# Patient Record
Sex: FEMALE | Race: WHITE | Employment: FULL TIME | URBAN - METROPOLITAN AREA
[De-identification: names, ages, dates, MRNs, and addresses within clinical notes are randomized per-mention and may not be internally consistent; named-entity substitution may affect disease eponyms.]

---

## 2017-02-13 RX ORDER — ALBUTEROL SULFATE 90 UG/1
AEROSOL, METERED RESPIRATORY (INHALATION)
Qty: 1 INHALER | Refills: 1 | Status: SHIPPED | OUTPATIENT
Start: 2017-02-13 | End: 2017-02-21 | Stop reason: SDUPTHER

## 2017-02-13 RX ORDER — BUDESONIDE AND FORMOTEROL FUMARATE DIHYDRATE 160; 4.5 UG/1; UG/1
AEROSOL RESPIRATORY (INHALATION)
Qty: 10.2 G | Refills: 0 | Status: SHIPPED | OUTPATIENT
Start: 2017-02-13 | End: 2017-03-17 | Stop reason: SDUPTHER

## 2017-02-21 ENCOUNTER — OFFICE VISIT (OUTPATIENT)
Dept: FAMILY MEDICINE CLINIC | Age: 47
End: 2017-02-21

## 2017-02-21 VITALS
HEART RATE: 76 BPM | RESPIRATION RATE: 12 BRPM | BODY MASS INDEX: 43.54 KG/M2 | WEIGHT: 255 LBS | SYSTOLIC BLOOD PRESSURE: 132 MMHG | DIASTOLIC BLOOD PRESSURE: 92 MMHG | TEMPERATURE: 98.2 F | OXYGEN SATURATION: 97 % | HEIGHT: 64 IN

## 2017-02-21 DIAGNOSIS — R05.9 COUGH: Primary | ICD-10-CM

## 2017-02-21 DIAGNOSIS — H61.21 IMPACTED CERUMEN OF RIGHT EAR: ICD-10-CM

## 2017-02-21 PROCEDURE — 99213 OFFICE O/P EST LOW 20 MIN: CPT | Performed by: NURSE PRACTITIONER

## 2017-02-21 RX ORDER — PREDNISONE 20 MG/1
TABLET ORAL
Qty: 20 TABLET | Refills: 0 | Status: SHIPPED | OUTPATIENT
Start: 2017-02-21 | End: 2017-05-17 | Stop reason: ALTCHOICE

## 2017-02-21 RX ORDER — ALBUTEROL SULFATE 90 UG/1
2 AEROSOL, METERED RESPIRATORY (INHALATION) EVERY 6 HOURS PRN
Qty: 1 INHALER | Refills: 3 | Status: SHIPPED | OUTPATIENT
Start: 2017-02-21 | End: 2018-01-05 | Stop reason: SDUPTHER

## 2017-02-21 ASSESSMENT — ENCOUNTER SYMPTOMS
CHEST TIGHTNESS: 1
WHEEZING: 1
SORE THROAT: 0
SHORTNESS OF BREATH: 1
COUGH: 1
RHINORRHEA: 0
SINUS PRESSURE: 1

## 2017-02-21 ASSESSMENT — PATIENT HEALTH QUESTIONNAIRE - PHQ9
SUM OF ALL RESPONSES TO PHQ9 QUESTIONS 1 & 2: 0
2. FEELING DOWN, DEPRESSED OR HOPELESS: 0
SUM OF ALL RESPONSES TO PHQ QUESTIONS 1-9: 0
1. LITTLE INTEREST OR PLEASURE IN DOING THINGS: 0

## 2017-03-17 RX ORDER — BUDESONIDE AND FORMOTEROL FUMARATE DIHYDRATE 160; 4.5 UG/1; UG/1
AEROSOL RESPIRATORY (INHALATION)
Qty: 10.2 G | Refills: 10 | Status: SHIPPED | OUTPATIENT
Start: 2017-03-17 | End: 2018-01-05

## 2017-03-21 DIAGNOSIS — E03.9 HYPOTHYROIDISM, UNSPECIFIED TYPE: ICD-10-CM

## 2017-03-22 RX ORDER — LEVOTHYROXINE SODIUM 0.05 MG/1
TABLET ORAL
Qty: 30 TABLET | Refills: 0 | Status: SHIPPED | OUTPATIENT
Start: 2017-03-22 | End: 2017-04-22 | Stop reason: SDUPTHER

## 2017-03-27 DIAGNOSIS — E03.9 ACQUIRED HYPOTHYROIDISM: Primary | ICD-10-CM

## 2017-03-30 DIAGNOSIS — E03.9 ACQUIRED HYPOTHYROIDISM: ICD-10-CM

## 2017-03-30 LAB
T4 FREE: 1.4 NG/DL (ref 0.9–1.8)
TSH SERPL DL<=0.05 MIU/L-ACNC: 2.14 UIU/ML (ref 0.27–4.2)

## 2017-04-07 ENCOUNTER — TELEPHONE (OUTPATIENT)
Dept: FAMILY MEDICINE CLINIC | Age: 47
End: 2017-04-07

## 2017-04-20 DIAGNOSIS — E28.2 PCOS (POLYCYSTIC OVARIAN SYNDROME): ICD-10-CM

## 2017-04-22 DIAGNOSIS — E03.9 HYPOTHYROIDISM, UNSPECIFIED TYPE: ICD-10-CM

## 2017-04-24 RX ORDER — LEVOTHYROXINE SODIUM 0.05 MG/1
TABLET ORAL
Qty: 30 TABLET | Refills: 0 | Status: SHIPPED | OUTPATIENT
Start: 2017-04-24 | End: 2017-05-22 | Stop reason: SDUPTHER

## 2017-05-17 ENCOUNTER — OFFICE VISIT (OUTPATIENT)
Dept: FAMILY MEDICINE CLINIC | Age: 47
End: 2017-05-17

## 2017-05-17 VITALS
SYSTOLIC BLOOD PRESSURE: 118 MMHG | DIASTOLIC BLOOD PRESSURE: 82 MMHG | HEIGHT: 64 IN | BODY MASS INDEX: 43.71 KG/M2 | TEMPERATURE: 97.9 F | WEIGHT: 256 LBS | OXYGEN SATURATION: 97 % | HEART RATE: 84 BPM

## 2017-05-17 DIAGNOSIS — J00 NASOPHARYNGITIS: Primary | ICD-10-CM

## 2017-05-17 DIAGNOSIS — H61.21 IMPACTED CERUMEN OF RIGHT EAR: ICD-10-CM

## 2017-05-17 DIAGNOSIS — J02.9 SORE THROAT: ICD-10-CM

## 2017-05-17 LAB — S PYO AG THROAT QL: NORMAL

## 2017-05-17 PROCEDURE — 87880 STREP A ASSAY W/OPTIC: CPT | Performed by: NURSE PRACTITIONER

## 2017-05-17 PROCEDURE — 99213 OFFICE O/P EST LOW 20 MIN: CPT | Performed by: NURSE PRACTITIONER

## 2017-05-17 RX ORDER — AMOXICILLIN 500 MG/1
500 CAPSULE ORAL 2 TIMES DAILY
Qty: 20 CAPSULE | Refills: 0 | Status: SHIPPED | OUTPATIENT
Start: 2017-05-17 | End: 2017-05-27

## 2017-05-17 ASSESSMENT — ENCOUNTER SYMPTOMS
RHINORRHEA: 0
SORE THROAT: 1
SINUS PRESSURE: 1
COUGH: 0

## 2017-05-22 DIAGNOSIS — E03.9 HYPOTHYROIDISM, UNSPECIFIED TYPE: ICD-10-CM

## 2017-05-22 RX ORDER — LEVOTHYROXINE SODIUM 0.05 MG/1
TABLET ORAL
Qty: 90 TABLET | Refills: 3 | Status: SHIPPED | OUTPATIENT
Start: 2017-05-22 | End: 2018-05-01 | Stop reason: SDUPTHER

## 2017-05-23 RX ORDER — BUDESONIDE AND FORMOTEROL FUMARATE DIHYDRATE 160; 4.5 UG/1; UG/1
AEROSOL RESPIRATORY (INHALATION)
Qty: 10.2 G | Refills: 3 | Status: SHIPPED | OUTPATIENT
Start: 2017-05-23 | End: 2018-05-01 | Stop reason: SDUPTHER

## 2017-07-05 ENCOUNTER — OFFICE VISIT (OUTPATIENT)
Dept: FAMILY MEDICINE CLINIC | Age: 47
End: 2017-07-05

## 2017-07-05 VITALS
BODY MASS INDEX: 43.94 KG/M2 | HEART RATE: 73 BPM | OXYGEN SATURATION: 96 % | DIASTOLIC BLOOD PRESSURE: 72 MMHG | WEIGHT: 256 LBS | SYSTOLIC BLOOD PRESSURE: 122 MMHG

## 2017-07-05 DIAGNOSIS — S39.012A LOW BACK STRAIN, INITIAL ENCOUNTER: Primary | ICD-10-CM

## 2017-07-05 DIAGNOSIS — M62.830 LUMBAR PARASPINAL MUSCLE SPASM: ICD-10-CM

## 2017-07-05 PROCEDURE — 99213 OFFICE O/P EST LOW 20 MIN: CPT | Performed by: FAMILY MEDICINE

## 2017-07-05 RX ORDER — CYCLOBENZAPRINE HCL 10 MG
10 TABLET ORAL NIGHTLY
Qty: 10 TABLET | Refills: 0 | Status: SHIPPED | OUTPATIENT
Start: 2017-07-05 | End: 2017-07-15

## 2017-07-05 RX ORDER — PREDNISONE 20 MG/1
TABLET ORAL
Qty: 18 TABLET | Refills: 0 | Status: SHIPPED | OUTPATIENT
Start: 2017-07-05 | End: 2017-07-14

## 2017-08-22 ENCOUNTER — OFFICE VISIT (OUTPATIENT)
Dept: DERMATOLOGY | Age: 47
End: 2017-08-22

## 2017-08-22 DIAGNOSIS — R21 RASH: ICD-10-CM

## 2017-08-22 DIAGNOSIS — L71.9 ROSACEA: ICD-10-CM

## 2017-08-22 DIAGNOSIS — D22.9 MULTIPLE NEVI: ICD-10-CM

## 2017-08-22 DIAGNOSIS — Z87.2 HISTORY OF ACTINIC KERATOSES: ICD-10-CM

## 2017-08-22 PROCEDURE — 99214 OFFICE O/P EST MOD 30 MIN: CPT | Performed by: DERMATOLOGY

## 2017-08-23 DIAGNOSIS — E28.2 PCOS (POLYCYSTIC OVARIAN SYNDROME): ICD-10-CM

## 2017-08-24 ENCOUNTER — TELEPHONE (OUTPATIENT)
Dept: FAMILY MEDICINE CLINIC | Age: 47
End: 2017-08-24

## 2018-01-05 ENCOUNTER — OFFICE VISIT (OUTPATIENT)
Dept: FAMILY MEDICINE CLINIC | Age: 48
End: 2018-01-05

## 2018-01-05 VITALS
HEART RATE: 70 BPM | DIASTOLIC BLOOD PRESSURE: 84 MMHG | SYSTOLIC BLOOD PRESSURE: 118 MMHG | OXYGEN SATURATION: 98 % | WEIGHT: 236 LBS | BODY MASS INDEX: 40.51 KG/M2

## 2018-01-05 DIAGNOSIS — E28.2 PCOS (POLYCYSTIC OVARIAN SYNDROME): ICD-10-CM

## 2018-01-05 DIAGNOSIS — R05.9 COUGH: Primary | ICD-10-CM

## 2018-01-05 DIAGNOSIS — R07.9 RIGHT-SIDED CHEST PAIN: ICD-10-CM

## 2018-01-05 PROCEDURE — 99214 OFFICE O/P EST MOD 30 MIN: CPT | Performed by: FAMILY MEDICINE

## 2018-01-05 RX ORDER — PREDNISONE 20 MG/1
40 TABLET ORAL DAILY
Qty: 10 TABLET | Refills: 0 | Status: SHIPPED | OUTPATIENT
Start: 2018-01-05 | End: 2018-01-10

## 2018-01-05 RX ORDER — ALBUTEROL SULFATE 90 UG/1
2 AEROSOL, METERED RESPIRATORY (INHALATION) EVERY 6 HOURS PRN
Qty: 1 INHALER | Refills: 3 | Status: SHIPPED | OUTPATIENT
Start: 2018-01-05

## 2018-01-05 NOTE — PROGRESS NOTES
[address] Note    Date: 1/5/2018                                               Subjective/Objective:     Chief Complaint   Patient presents with    Chest Congestion     right side pain x 1 week when taking deep breathes    Medication Refill       HPI   R sided rib/chest pain with a deep breath x1 week. No cough. No fever. +mild SOB. No wheeze. Patient Active Problem List    Diagnosis Date Noted    Allergy-induced asthma 02/25/2014    PCOS (polycystic ovarian syndrome) 02/25/2014    Hypothyroidism 10/03/2011    AR (allergic rhinitis) 10/03/2011       Past Medical History:   Diagnosis Date    AR (allergic rhinitis)     Obesity, unspecified     PCO (polycystic ovaries)     Unspecified hypothyroidism        Current Outpatient Prescriptions   Medication Sig Dispense Refill    albuterol sulfate HFA (VENTOLIN HFA) 108 (90 Base) MCG/ACT inhaler Inhale 2 puffs into the lungs every 6 hours as needed for Wheezing 1 Inhaler 3    metFORMIN (GLUCOPHAGE) 500 MG tablet Take 1 tablet by mouth 2 times daily 180 tablet 1    predniSONE (DELTASONE) 20 MG tablet Take 2 tablets by mouth daily for 5 days 10 tablet 0    SYMBICORT 160-4.5 MCG/ACT AERO INHALE 2 PUFFS BY MOUTH TWICE DAILY 10.2 g 3    levothyroxine (SYNTHROID) 50 MCG tablet TAKE 1 TABLET BY MOUTH DAILY 90 tablet 3    Vitamin D (CHOLECALCIFEROL) 1000 UNITS CAPS capsule Take 1,000 Units by mouth daily.  cetirizine (ZYRTEC) 10 MG tablet Take 10 mg by mouth daily.  therapeutic multivitamin-minerals (THERAGRAN-M) tablet Take 1 tablet by mouth daily. No current facility-administered medications for this visit. Allergies   Allergen Reactions    Bactrim     Vicodin [Hydrocodone-Acetaminophen]        Review of Systems   No vomiting, no rash, no bruise    Vitals:  /84 (Site: Left Arm, Position: Sitting, Cuff Size: Large Adult)   Pulse 70   Wt 236 lb (107 kg)   SpO2 98%   Breastfeeding?  No   BMI 40.51

## 2018-01-08 ENCOUNTER — TELEPHONE (OUTPATIENT)
Dept: FAMILY MEDICINE CLINIC | Age: 48
End: 2018-01-08

## 2018-01-08 DIAGNOSIS — E28.2 PCOS (POLYCYSTIC OVARIAN SYNDROME): ICD-10-CM

## 2018-01-08 DIAGNOSIS — Z00.00 ANNUAL PHYSICAL EXAM: ICD-10-CM

## 2018-01-08 DIAGNOSIS — E03.9 ACQUIRED HYPOTHYROIDISM: Primary | ICD-10-CM

## 2018-01-08 NOTE — TELEPHONE ENCOUNTER
Patient scheduled PE with Dr. Jasbir Lewis on 1-29-18 via My Chart  Please place appropriate labs  Patient has been informed to fast.

## 2018-01-12 ENCOUNTER — HOSPITAL ENCOUNTER (OUTPATIENT)
Dept: OTHER | Age: 48
Discharge: HOME | End: 2018-01-12
Attending: FAMILY MEDICINE | Admitting: FAMILY MEDICINE

## 2018-01-12 DIAGNOSIS — E28.2 PCOS (POLYCYSTIC OVARIAN SYNDROME): ICD-10-CM

## 2018-01-12 DIAGNOSIS — E03.9 ACQUIRED HYPOTHYROIDISM: ICD-10-CM

## 2018-01-12 DIAGNOSIS — Z00.00 ANNUAL PHYSICAL EXAM: ICD-10-CM

## 2018-01-12 LAB
A/G RATIO: 1.3 (ref 1.1–2.2)
ALBUMIN SERPL-MCNC: 4 G/DL (ref 3.4–5)
ALP BLD-CCNC: 76 U/L (ref 40–129)
ALT SERPL-CCNC: 18 U/L (ref 10–40)
ANION GAP SERPL CALCULATED.3IONS-SCNC: 15 MMOL/L (ref 3–16)
AST SERPL-CCNC: 19 U/L (ref 15–37)
BASOPHILS ABSOLUTE: 0.1 K/UL (ref 0–0.2)
BASOPHILS RELATIVE PERCENT: 0.6 %
BILIRUB SERPL-MCNC: 0.3 MG/DL (ref 0–1)
BUN BLDV-MCNC: 19 MG/DL (ref 7–20)
CALCIUM SERPL-MCNC: 9.5 MG/DL (ref 8.3–10.6)
CHLORIDE BLD-SCNC: 102 MMOL/L (ref 99–110)
CHOLESTEROL, TOTAL: 169 MG/DL (ref 0–199)
CO2: 27 MMOL/L (ref 21–32)
CREAT SERPL-MCNC: 0.5 MG/DL (ref 0.6–1.1)
EOSINOPHILS ABSOLUTE: 0.3 K/UL (ref 0–0.6)
EOSINOPHILS RELATIVE PERCENT: 3.6 %
ESTIMATED AVERAGE GLUCOSE: 108.3 MG/DL
GFR AFRICAN AMERICAN: >60
GFR NON-AFRICAN AMERICAN: >60
GLOBULIN: 3.1 G/DL
GLUCOSE BLD-MCNC: 89 MG/DL (ref 70–99)
HBA1C MFR BLD: 5.4 %
HCT VFR BLD CALC: 41.6 % (ref 36–48)
HDLC SERPL-MCNC: 51 MG/DL (ref 40–60)
HEMOGLOBIN: 13.4 G/DL (ref 12–16)
LDL CHOLESTEROL CALCULATED: 95 MG/DL
LYMPHOCYTES ABSOLUTE: 4.1 K/UL (ref 1–5.1)
LYMPHOCYTES RELATIVE PERCENT: 44.9 %
MCH RBC QN AUTO: 29.1 PG (ref 26–34)
MCHC RBC AUTO-ENTMCNC: 32.3 G/DL (ref 31–36)
MCV RBC AUTO: 90 FL (ref 80–100)
MONOCYTES ABSOLUTE: 0.6 K/UL (ref 0–1.3)
MONOCYTES RELATIVE PERCENT: 6.8 %
NEUTROPHILS ABSOLUTE: 4.1 K/UL (ref 1.7–7.7)
NEUTROPHILS RELATIVE PERCENT: 44.1 %
PDW BLD-RTO: 13.9 % (ref 12.4–15.4)
PLATELET # BLD: 306 K/UL (ref 135–450)
PMV BLD AUTO: 9.3 FL (ref 5–10.5)
POTASSIUM SERPL-SCNC: 4.6 MMOL/L (ref 3.5–5.1)
RBC # BLD: 4.62 M/UL (ref 4–5.2)
SODIUM BLD-SCNC: 144 MMOL/L (ref 136–145)
TOTAL PROTEIN: 7.1 G/DL (ref 6.4–8.2)
TRIGL SERPL-MCNC: 116 MG/DL (ref 0–150)
TSH REFLEX FT4: 3 UIU/ML (ref 0.27–4.2)
VLDLC SERPL CALC-MCNC: 23 MG/DL
WBC # BLD: 9.2 K/UL (ref 4–11)

## 2018-01-29 ENCOUNTER — OFFICE VISIT (OUTPATIENT)
Dept: FAMILY MEDICINE CLINIC | Age: 48
End: 2018-01-29

## 2018-01-29 VITALS
SYSTOLIC BLOOD PRESSURE: 134 MMHG | HEART RATE: 73 BPM | OXYGEN SATURATION: 99 % | BODY MASS INDEX: 40.29 KG/M2 | HEIGHT: 64 IN | WEIGHT: 236 LBS | DIASTOLIC BLOOD PRESSURE: 78 MMHG

## 2018-01-29 DIAGNOSIS — Z00.00 ROUTINE ADULT HEALTH MAINTENANCE: Primary | ICD-10-CM

## 2018-01-29 DIAGNOSIS — E03.9 ACQUIRED HYPOTHYROIDISM: ICD-10-CM

## 2018-01-29 DIAGNOSIS — L30.9 ECZEMA, UNSPECIFIED TYPE: ICD-10-CM

## 2018-01-29 DIAGNOSIS — Z12.31 VISIT FOR SCREENING MAMMOGRAM: ICD-10-CM

## 2018-01-29 DIAGNOSIS — E28.2 PCOS (POLYCYSTIC OVARIAN SYNDROME): ICD-10-CM

## 2018-01-29 DIAGNOSIS — J45.40 MODERATE PERSISTENT EXTRINSIC ASTHMA WITHOUT COMPLICATION: ICD-10-CM

## 2018-01-29 DIAGNOSIS — E66.01 MORBID OBESITY WITH BMI OF 40.0-44.9, ADULT (HCC): ICD-10-CM

## 2018-01-29 LAB
BACTERIA URINE, POC: NORMAL
BILIRUBIN URINE: 0 MG/DL
BLOOD, URINE: NEGATIVE
CASTS URINE, POC: NORMAL
CLARITY: CLEAR
COLOR: COLORLESS
CRYSTALS URINE, POC: NORMAL
EPI CELLS URINE, POC: NORMAL
GLUCOSE URINE: NORMAL
KETONES, URINE: NEGATIVE
LEUKOCYTE EST, POC: NORMAL
NITRITE, URINE: NEGATIVE
PH UA: 6.5 (ref 4.5–8)
PROTEIN UA: NEGATIVE
RBC URINE, POC: NORMAL
SPECIFIC GRAVITY UA: 1.01 (ref 1–1.03)
UROBILINOGEN, URINE: NORMAL
WBC URINE, POC: NORMAL
YEAST URINE, POC: NORMAL

## 2018-01-29 PROCEDURE — 81000 URINALYSIS NONAUTO W/SCOPE: CPT | Performed by: FAMILY MEDICINE

## 2018-01-29 PROCEDURE — 99396 PREV VISIT EST AGE 40-64: CPT | Performed by: FAMILY MEDICINE

## 2018-01-29 NOTE — PROGRESS NOTES
ASSESSMENT:   Complete physical without pap. 1. Routine adult health maintenance    2. PCOS (polycystic ovarian syndrome)    3. Acquired hypothyroidism    4. Moderate persistent extrinsic asthma without complication    5. Eczema, unspecified type    6. Morbid obesity with BMI of 40.0-44.9, adult ([facility] [address]    7. Visit for screening mammogram          PLAN:   1. All health maintenance issues were updated. 2. Recommend begin progressive daily aerobic exercise program, follow a low fat, low cholesterol diet, attempt to lose weight and continue current medications  3.  Eye exam asap  4.  continue current meds  5. mammogram

## 2018-04-02 DIAGNOSIS — E28.2 PCOS (POLYCYSTIC OVARIAN SYNDROME): ICD-10-CM

## 2018-05-01 DIAGNOSIS — E03.9 HYPOTHYROIDISM, UNSPECIFIED TYPE: ICD-10-CM

## 2018-05-01 RX ORDER — BUDESONIDE AND FORMOTEROL FUMARATE DIHYDRATE 160; 4.5 UG/1; UG/1
AEROSOL RESPIRATORY (INHALATION)
Qty: 10.2 G | Refills: 3 | Status: SHIPPED | OUTPATIENT
Start: 2018-05-01 | End: 2019-01-28 | Stop reason: SDUPTHER

## 2018-05-01 RX ORDER — LEVOTHYROXINE SODIUM 0.05 MG/1
TABLET ORAL
Qty: 90 TABLET | Refills: 3 | Status: SHIPPED | OUTPATIENT
Start: 2018-05-01 | End: 2019-04-20 | Stop reason: SDUPTHER

## 2018-05-07 ENCOUNTER — OFFICE VISIT (OUTPATIENT)
Dept: FAMILY MEDICINE CLINIC | Age: 48
End: 2018-05-07

## 2018-05-07 VITALS
SYSTOLIC BLOOD PRESSURE: 122 MMHG | TEMPERATURE: 98.1 F | DIASTOLIC BLOOD PRESSURE: 82 MMHG | HEART RATE: 79 BPM | WEIGHT: 229.6 LBS | BODY MASS INDEX: 40.03 KG/M2 | OXYGEN SATURATION: 98 % | RESPIRATION RATE: 16 BRPM

## 2018-05-07 DIAGNOSIS — J06.9 VIRAL URI: Primary | ICD-10-CM

## 2018-05-07 PROCEDURE — 99213 OFFICE O/P EST LOW 20 MIN: CPT | Performed by: FAMILY MEDICINE

## 2018-05-07 ASSESSMENT — PATIENT HEALTH QUESTIONNAIRE - PHQ9
SUM OF ALL RESPONSES TO PHQ QUESTIONS 1-9: 0
1. LITTLE INTEREST OR PLEASURE IN DOING THINGS: 0
2. FEELING DOWN, DEPRESSED OR HOPELESS: 0
SUM OF ALL RESPONSES TO PHQ9 QUESTIONS 1 & 2: 0

## 2018-08-23 ENCOUNTER — OFFICE VISIT (OUTPATIENT)
Dept: DERMATOLOGY | Age: 48
End: 2018-08-23

## 2018-08-23 DIAGNOSIS — L71.9 ROSACEA: ICD-10-CM

## 2018-08-23 DIAGNOSIS — R21 RASH: ICD-10-CM

## 2018-08-23 DIAGNOSIS — D22.9 MULTIPLE NEVI: ICD-10-CM

## 2018-08-23 DIAGNOSIS — Z87.2 HISTORY OF ACTINIC KERATOSES: Primary | ICD-10-CM

## 2018-08-23 PROCEDURE — 99213 OFFICE O/P EST LOW 20 MIN: CPT | Performed by: DERMATOLOGY

## 2018-08-23 NOTE — PROGRESS NOTES
[facility]  MD [address]  1970    57 y.o. female     Date of Visit: 2018    Chief Complaint: moles, f/u AK's  Chief Complaint   Patient presents with    Skin Exam     PT comes in today for her yearly full skin exam. No known problems or concerns at this time. Last seen: 1 year ago  *her kids go to [employer]    History of Present Illness:    1. Here for f/u for AK's. Here for full skin check. No new concerns. S/p bx of a persistent papule on the L medial cheek that was read as actinic keratosis and then treated with LN2. Remains clear and no probs with previous sites. She has a hx of sunburns, tanning bed use as a teen but wears sunscreen regularly over the past 10 years. 2. She has scattered asx pigmented lesions on the trunk and extremities, present for many years; no change in size/shape/color of any lesions; no bleeding lesions. 3. She still has waxing/waning intermittent dry patches on the chest and legs. They continue to spontaneously resolve and can be mildly pruritic. They do not bother her much. No trx tried. No family hx of psoriasis but she has 13 yo twin girls with eczema. They started at [facility] 2016.    4. F/u rosacea - has erythema and few papules on the face but no recent trx tried. No personal or family hx of skin cancer. Review of Systems:  Gen: Feels well, good sense of health. Skin: No changing moles or lesions. MuscSkel: No joint pain. Past Medical History, Family History, Surgical History, Medications and Allergies reviewed.     Past Medical History:   Diagnosis Date    Allergy-induced asthma 2014    AR (allergic rhinitis)     Eczema     Morbid obesity with BMI of 40.0-44.9, adult ([address]     PCO (polycystic ovaries)     Unspecified hypothyroidism        Past Surgical History:   Procedure Laterality Date     SECTION      x3    CHOLECYSTECTOMY      SINUS if no improvement - will call if she wants the rx

## 2018-08-30 ENCOUNTER — OFFICE VISIT (OUTPATIENT)
Dept: FAMILY MEDICINE CLINIC | Age: 48
End: 2018-08-30

## 2018-08-30 VITALS
BODY MASS INDEX: 39.96 KG/M2 | SYSTOLIC BLOOD PRESSURE: 128 MMHG | DIASTOLIC BLOOD PRESSURE: 92 MMHG | OXYGEN SATURATION: 98 % | WEIGHT: 229.2 LBS | HEART RATE: 76 BPM

## 2018-08-30 DIAGNOSIS — M54.16 LUMBAR RADICULOPATHY: Primary | ICD-10-CM

## 2018-08-30 PROCEDURE — 99213 OFFICE O/P EST LOW 20 MIN: CPT | Performed by: FAMILY MEDICINE

## 2018-08-30 RX ORDER — METHYLPREDNISOLONE 4 MG/1
TABLET ORAL
Qty: 21 TABLET | Refills: 0 | Status: SHIPPED | OUTPATIENT
Start: 2018-08-30 | End: 2018-09-05

## 2018-09-05 ENCOUNTER — HOSPITAL ENCOUNTER (OUTPATIENT)
Dept: MRI IMAGING | Age: 48
Discharge: HOME | End: 2018-09-05
Attending: FAMILY MEDICINE | Admitting: FAMILY MEDICINE

## 2018-09-05 DIAGNOSIS — M54.16 LUMBAR RADICULOPATHY: ICD-10-CM

## 2018-09-23 DIAGNOSIS — E28.2 PCOS (POLYCYSTIC OVARIAN SYNDROME): ICD-10-CM

## 2019-01-28 RX ORDER — BUDESONIDE AND FORMOTEROL FUMARATE DIHYDRATE 160; 4.5 UG/1; UG/1
AEROSOL RESPIRATORY (INHALATION)
Qty: 10.2 G | Refills: 3 | Status: SHIPPED | OUTPATIENT
Start: 2019-01-28 | End: 2019-02-07 | Stop reason: SDUPTHER

## 2019-02-07 RX ORDER — BUDESONIDE AND FORMOTEROL FUMARATE DIHYDRATE 160; 4.5 UG/1; UG/1
AEROSOL RESPIRATORY (INHALATION)
Qty: 10.2 G | Refills: 3 | Status: SHIPPED | OUTPATIENT
Start: 2019-02-07 | End: 2021-01-20 | Stop reason: SDUPTHER

## 2019-03-08 ENCOUNTER — OFFICE VISIT (OUTPATIENT)
Dept: FAMILY MEDICINE CLINIC | Age: 49
End: 2019-03-08
Payer: COMMERCIAL

## 2019-03-08 VITALS
TEMPERATURE: 98.2 F | OXYGEN SATURATION: 98 % | SYSTOLIC BLOOD PRESSURE: 110 MMHG | HEART RATE: 78 BPM | DIASTOLIC BLOOD PRESSURE: 60 MMHG | WEIGHT: 243.9 LBS | BODY MASS INDEX: 42.53 KG/M2

## 2019-03-08 DIAGNOSIS — B96.89 ACUTE BACTERIAL SINUSITIS: Primary | ICD-10-CM

## 2019-03-08 DIAGNOSIS — J01.90 ACUTE BACTERIAL SINUSITIS: Primary | ICD-10-CM

## 2019-03-08 PROCEDURE — 99213 OFFICE O/P EST LOW 20 MIN: CPT | Performed by: FAMILY MEDICINE

## 2019-03-08 RX ORDER — AMOXICILLIN 500 MG/1
500 CAPSULE ORAL 2 TIMES DAILY
Qty: 20 CAPSULE | Refills: 0 | Status: SHIPPED | OUTPATIENT
Start: 2019-03-08 | End: 2019-03-18

## 2019-03-08 ASSESSMENT — PATIENT HEALTH QUESTIONNAIRE - PHQ9
1. LITTLE INTEREST OR PLEASURE IN DOING THINGS: 0
SUM OF ALL RESPONSES TO PHQ QUESTIONS 1-9: 0
2. FEELING DOWN, DEPRESSED OR HOPELESS: 0
SUM OF ALL RESPONSES TO PHQ9 QUESTIONS 1 & 2: 0
SUM OF ALL RESPONSES TO PHQ QUESTIONS 1-9: 0

## 2019-04-20 DIAGNOSIS — E03.9 HYPOTHYROIDISM, UNSPECIFIED TYPE: ICD-10-CM

## 2019-04-22 RX ORDER — LEVOTHYROXINE SODIUM 0.05 MG/1
TABLET ORAL
Qty: 90 TABLET | Refills: 3 | Status: SHIPPED | OUTPATIENT
Start: 2019-04-22 | End: 2020-04-17 | Stop reason: SDUPTHER

## 2019-05-13 ENCOUNTER — OFFICE VISIT (OUTPATIENT)
Dept: FAMILY MEDICINE CLINIC | Age: 49
End: 2019-05-13
Payer: COMMERCIAL

## 2019-05-13 VITALS
BODY MASS INDEX: 42.03 KG/M2 | TEMPERATURE: 99 F | DIASTOLIC BLOOD PRESSURE: 78 MMHG | SYSTOLIC BLOOD PRESSURE: 118 MMHG | HEIGHT: 64 IN | OXYGEN SATURATION: 96 % | WEIGHT: 246.2 LBS | RESPIRATION RATE: 16 BRPM | HEART RATE: 76 BPM

## 2019-05-13 DIAGNOSIS — R05.9 COUGH: Primary | ICD-10-CM

## 2019-05-13 PROCEDURE — 99213 OFFICE O/P EST LOW 20 MIN: CPT | Performed by: NURSE PRACTITIONER

## 2019-05-13 RX ORDER — AZITHROMYCIN 250 MG/1
TABLET, FILM COATED ORAL
Qty: 1 PACKET | Refills: 0 | Status: SHIPPED | OUTPATIENT
Start: 2019-05-13 | End: 2019-08-22

## 2019-05-13 ASSESSMENT — ENCOUNTER SYMPTOMS
RHINORRHEA: 1
SHORTNESS OF BREATH: 0
SORE THROAT: 0
CHEST TIGHTNESS: 0
WHEEZING: 1
SINUS PRESSURE: 0
COUGH: 1

## 2019-05-13 NOTE — PROGRESS NOTES
SUBJECTIVE:  Pt is a of 54 y.o. female comes in today with   Chief Complaint   Patient presents with    Chest Congestion     Pt states she has chest congestion, cough, ear pain, and head congestion x 3 days          URI    This is a new problem. The current episode started in the past 7 days (5 days ago). The problem has been unchanged. There has been no fever. Associated symptoms include congestion (mild), coughing, headaches, rhinorrhea (milld) and wheezing (mild). Pertinent negatives include no ear pain or sore throat. She has tried nothing for the symptoms. Prior to Visit Medications    Medication Sig Taking? Authorizing Provider   levothyroxine (SYNTHROID) 50 MCG tablet TAKE 1 TABLET BY MOUTH EVERY DAY Yes Natalie Lim MD   budesonide-formoterol (SYMBICORT) 160-4.5 MCG/ACT AERO INHALE 2 PUFFS BY MOUTH TWICE DAILY Yes Tom Tovar MD   metFORMIN (GLUCOPHAGE) 500 MG tablet TAKE 1 TABLET BY MOUTH 2 TIMES DAILY Yes Natalie Lim MD   albuterol sulfate HFA (VENTOLIN HFA) 108 (90 Base) MCG/ACT inhaler Inhale 2 puffs into the lungs every 6 hours as needed for Wheezing Yes Ruddy Damon MD   Vitamin D (CHOLECALCIFEROL) 1000 UNITS CAPS capsule Take 1,000 Units by mouth daily. Yes Historical Provider, MD   cetirizine (ZYRTEC) 10 MG tablet Take 10 mg by mouth daily. Yes Historical Provider, MD   therapeutic multivitamin-minerals (THERAGRAN-M) tablet Take 1 tablet by mouth daily. Yes Historical Provider, MD       Patient's past medical, surgical, social and family histories were reviewed and updated as appropriate. Review of Systems   Constitutional: Positive for fatigue. Negative for chills and fever. HENT: Positive for congestion (mild), postnasal drip (mild) and rhinorrhea (milld). Negative for ear pain, sinus pressure and sore throat. Respiratory: Positive for cough and wheezing (mild). Negative for chest tightness and shortness of breath. Neurological: Positive for headaches. Physical Exam   Constitutional: She is oriented to person, place, and time. She appears well-developed and well-nourished. HENT:   Right Ear: Tympanic membrane normal.   Left Ear: Tympanic membrane normal.   Nose: Nose normal.   Mouth/Throat: Uvula is midline and mucous membranes are normal. Posterior oropharyngeal erythema present. No oropharyngeal exudate. Cardiovascular: Normal rate, regular rhythm and normal heart sounds. Pulmonary/Chest: Effort normal and breath sounds normal.   Lymphadenopathy:     She has no cervical adenopathy. Neurological: She is alert and oriented to person, place, and time. Skin: Skin is warm and dry. Vitals reviewed. /78   Pulse 76   Temp 99 °F (37.2 °C)   Resp 16   Ht 5' 3.5\" (1.613 m)   Wt 246 lb 3.2 oz (111.7 kg)   LMP 05/06/2019 (Exact Date)   SpO2 96%   Breastfeeding? No   BMI 42.93 kg/m²       Assessment/Plan    1. Cough  Continue Symbicort and Ventolin inhalers  - azithromycin (ZITHROMAX) 250 MG tablet; Take 2 tabs (500 mg) on Day 1, and take 1 tab (250 mg) on days 2 through 5. Dispense: 1 packet; Refill: 0  Symtomatic treatment: Tylenol / Advil, rest, increase fluids. May also use:Robitussin DM or Delsym. See pt instructions  F/u5-7 days if no improvement, sooner if symptoms worsen. Discussed use, benefit, and side effects of prescribed medications. All patient questions answered. Pt voiced understanding.

## 2019-05-14 NOTE — TELEPHONE ENCOUNTER
Received by fax from [facility]    Per patient request asking about Statin Therapy for her Diabetes    Scanned into media and sent to Dr. Jacob Pruitt

## 2019-05-15 NOTE — TELEPHONE ENCOUNTER
Printed and filled out. Will fax to pharmacy. Noted on form that pt is not diabetic, that she takes med for PCOS.

## 2019-08-22 ENCOUNTER — OFFICE VISIT (OUTPATIENT)
Dept: DERMATOLOGY | Age: 49
End: 2019-08-22
Payer: COMMERCIAL

## 2019-08-22 DIAGNOSIS — L71.9 ROSACEA: ICD-10-CM

## 2019-08-22 DIAGNOSIS — L30.9 DERMATITIS: ICD-10-CM

## 2019-08-22 DIAGNOSIS — L57.0 AK (ACTINIC KERATOSIS): Primary | ICD-10-CM

## 2019-08-22 DIAGNOSIS — D22.9 MULTIPLE NEVI: ICD-10-CM

## 2019-08-22 PROCEDURE — 99213 OFFICE O/P EST LOW 20 MIN: CPT | Performed by: DERMATOLOGY

## 2019-08-22 PROCEDURE — 17000 DESTRUCT PREMALG LESION: CPT | Performed by: DERMATOLOGY

## 2019-08-22 PROCEDURE — 17003 DESTRUCT PREMALG LES 2-14: CPT | Performed by: DERMATOLOGY

## 2019-08-22 NOTE — PROGRESS NOTES
[facility]  Bam Chopra MD  [address]  1970    54 y.o. female     Date of Visit: 2019    Chief Complaint: moles, f/u AK's  Chief Complaint   Patient presents with    Skin Exam     FSE    - mole check     HX: AK, multi nevi     Last seen: 1 year ago  *her kids go to [facility]    History of Present Illness:    1. Here for f/u for AK's. Here for full skin check. Few new concerns on the face. S/p bx of a persistent papule on the L medial cheek that was read as actinic keratosis and then treated with LN2. No probs with previous sites. She has a hx of sunburns, tanning bed use as a teen but wears sunscreen regularly over the past 10 years. 2. She has scattered asx pigmented lesions on the trunk and extremities, present for many years; no change in size/shape/color of any lesions; no bleeding lesions. 3. She still has waxing/waning intermittent dry patches on the chest and legs. They continue to spontaneously resolve and can be mildly pruritic. They do not bother her much. HC helps. Clear today. No family hx of psoriasis but she has 26 yo twin girls with eczema. They started at [facility] 2016.    4. F/u rosacea - has erythema and few papules on the face but no recent trx tried. Not bothersome. No personal or family hx of skin cancer. Review of Systems:  Gen: Feels well, good sense of health. Skin: No changing moles or lesions. MuscSkel: No joint pain. Past Medical History, Family History, Surgical History, Medications and Allergies reviewed.     Past Medical History:   Diagnosis Date    Allergy-induced asthma 2014    AR (allergic rhinitis)     Eczema     Morbid obesity with BMI of 40.0-44.9, adult ([address]     PCO (polycystic ovaries)     Unspecified hypothyroidism        Past Surgical History:   Procedure Laterality Date     SECTION      x3    CHOLECYSTECTOMY     [facility] ed/reassured  - OK to cont HC 1% cream or call if worsening for prescription trx    4.  Mild rosacea - ET and PP  - discussed treatment - start finacea or soolantra + oral abx if no improvement - will call if she wants the rx

## 2019-09-18 DIAGNOSIS — E28.2 PCOS (POLYCYSTIC OVARIAN SYNDROME): ICD-10-CM

## 2020-03-16 NOTE — TELEPHONE ENCOUNTER
Last office visit 3/8/2019     Last written 9/18/2019    Next office visit scheduled Visit date not found    Requested Prescriptions     Pending Prescriptions Disp Refills    metFORMIN (GLUCOPHAGE) 500 MG tablet 180 tablet 1     Sig: Take 1 tablet by mouth 2 times daily

## 2020-04-17 RX ORDER — LEVOTHYROXINE SODIUM 0.05 MG/1
TABLET ORAL
Qty: 30 TABLET | Refills: 0 | Status: SHIPPED | OUTPATIENT
Start: 2020-04-17 | End: 2020-05-27

## 2020-04-17 NOTE — TELEPHONE ENCOUNTER
Medication:   Requested Prescriptions     Pending Prescriptions Disp Refills    levothyroxine (SYNTHROID) 50 MCG tablet 90 tablet 3     Sig: TAKE 1 TABLET BY MOUTH EVERY DAY       Last Filled:  4/22/2019 #90 w/3 refills     Patient Phone Number: [phone] (home) [phone] (work)    Last appt: 3/8/2019   Next appt: Visit date not found    Last Thyroid:   Lab Results   Component Value Date    TSH 2.14 03/30/2017    T4FREE 1.4 03/30/2017    [id number] 7.5 02/21/2014

## 2020-05-27 RX ORDER — LEVOTHYROXINE SODIUM 0.05 MG/1
TABLET ORAL
Qty: 30 TABLET | Refills: 0 | Status: SHIPPED | OUTPATIENT
Start: 2020-05-27 | End: 2020-06-19

## 2020-06-19 RX ORDER — LEVOTHYROXINE SODIUM 0.05 MG/1
TABLET ORAL
Qty: 30 TABLET | Refills: 0 | Status: SHIPPED | OUTPATIENT
Start: 2020-06-19 | End: 2020-07-22

## 2020-06-19 NOTE — TELEPHONE ENCOUNTER
Medication:   Requested Prescriptions     Pending Prescriptions Disp Refills    levothyroxine (SYNTHROID) 50 MCG tablet [Pharmacy Med Name: LEVOTHYROXINE 50 MCG TABLET] 30 tablet 0     Sig: TAKE 1 TABLET BY MOUTH EVERY DAY       Last Filled:  5/27/2020 30 tabs 0 refills     Patient Phone Number: [phone] (home) [phone] (work)    Last appt: 3/8/2019   Next appt: Visit date not found    Last Thyroid:   Lab Results   Component Value Date    TSH 2.14 03/30/2017    T4FREE 1.4 03/30/2017    [id number] 7.5 02/21/2014         Appointment reminder sent

## 2020-06-22 ENCOUNTER — VIRTUAL VISIT (OUTPATIENT)
Dept: FAMILY MEDICINE CLINIC | Age: 50
End: 2020-06-22
Payer: COMMERCIAL

## 2020-06-22 PROCEDURE — 99214 OFFICE O/P EST MOD 30 MIN: CPT | Performed by: FAMILY MEDICINE

## 2020-06-22 ASSESSMENT — PATIENT HEALTH QUESTIONNAIRE - PHQ9
SUM OF ALL RESPONSES TO PHQ QUESTIONS 1-9: 0
SUM OF ALL RESPONSES TO PHQ9 QUESTIONS 1 & 2: 0
SUM OF ALL RESPONSES TO PHQ QUESTIONS 1-9: 0
2. FEELING DOWN, DEPRESSED OR HOPELESS: 0
1. LITTLE INTEREST OR PLEASURE IN DOING THINGS: 0

## 2020-07-02 ENCOUNTER — HOSPITAL ENCOUNTER (OUTPATIENT)
Dept: MAMMOGRAPHY | Age: 50
Discharge: HOME | End: 2020-07-02
Payer: COMMERCIAL

## 2020-07-02 DIAGNOSIS — E03.9 ACQUIRED HYPOTHYROIDISM: ICD-10-CM

## 2020-07-02 DIAGNOSIS — Z00.00 HEALTHCARE MAINTENANCE: ICD-10-CM

## 2020-07-02 LAB
A/G RATIO: 1.6 (ref 1.1–2.2)
ALBUMIN SERPL-MCNC: 4.4 G/DL (ref 3.4–5)
ALP BLD-CCNC: 76 U/L (ref 40–129)
ALT SERPL-CCNC: 24 U/L (ref 10–40)
ANION GAP SERPL CALCULATED.3IONS-SCNC: 14 MMOL/L (ref 3–16)
AST SERPL-CCNC: 20 U/L (ref 15–37)
BASOPHILS ABSOLUTE: 0.1 K/UL (ref 0–0.2)
BASOPHILS RELATIVE PERCENT: 0.7 %
BILIRUB SERPL-MCNC: <0.2 MG/DL (ref 0–1)
BUN BLDV-MCNC: 14 MG/DL (ref 7–20)
CALCIUM SERPL-MCNC: 9.6 MG/DL (ref 8.3–10.6)
CHLORIDE BLD-SCNC: 101 MMOL/L (ref 99–110)
CHOLESTEROL, TOTAL: 212 MG/DL (ref 0–199)
CO2: 22 MMOL/L (ref 21–32)
CREAT SERPL-MCNC: 0.5 MG/DL (ref 0.6–1.1)
EOSINOPHILS ABSOLUTE: 0.4 K/UL (ref 0–0.6)
EOSINOPHILS RELATIVE PERCENT: 4.4 %
GFR AFRICAN AMERICAN: >60
GFR NON-AFRICAN AMERICAN: >60
GLOBULIN: 2.7 G/DL
GLUCOSE BLD-MCNC: 112 MG/DL (ref 70–99)
HCT VFR BLD CALC: 40.2 % (ref 36–48)
HDLC SERPL-MCNC: 46 MG/DL (ref 40–60)
HEMOGLOBIN: 13.4 G/DL (ref 12–16)
LDL CHOLESTEROL CALCULATED: 129 MG/DL
LYMPHOCYTES ABSOLUTE: 2.6 K/UL (ref 1–5.1)
LYMPHOCYTES RELATIVE PERCENT: 29 %
MCH RBC QN AUTO: 29.6 PG (ref 26–34)
MCHC RBC AUTO-ENTMCNC: 33.4 G/DL (ref 31–36)
MCV RBC AUTO: 88.6 FL (ref 80–100)
MONOCYTES ABSOLUTE: 0.7 K/UL (ref 0–1.3)
MONOCYTES RELATIVE PERCENT: 8.3 %
NEUTROPHILS ABSOLUTE: 5.2 K/UL (ref 1.7–7.7)
NEUTROPHILS RELATIVE PERCENT: 57.6 %
PDW BLD-RTO: 14.4 % (ref 12.4–15.4)
PLATELET # BLD: 256 K/UL (ref 135–450)
PMV BLD AUTO: 9 FL (ref 5–10.5)
POTASSIUM SERPL-SCNC: 4.3 MMOL/L (ref 3.5–5.1)
RBC # BLD: 4.54 M/UL (ref 4–5.2)
SODIUM BLD-SCNC: 137 MMOL/L (ref 136–145)
TOTAL PROTEIN: 7.1 G/DL (ref 6.4–8.2)
TRIGL SERPL-MCNC: 184 MG/DL (ref 0–150)
TSH SERPL DL<=0.05 MIU/L-ACNC: 2.9 UIU/ML (ref 0.27–4.2)
VLDLC SERPL CALC-MCNC: 37 MG/DL
WBC # BLD: 9.1 K/UL (ref 4–11)

## 2020-07-02 PROCEDURE — 77067 SCR MAMMO BI INCL CAD: CPT

## 2020-07-03 DIAGNOSIS — E28.2 PCOS (POLYCYSTIC OVARIAN SYNDROME): ICD-10-CM

## 2020-07-03 LAB
ESTIMATED AVERAGE GLUCOSE: 111.2 MG/DL
HBA1C MFR BLD: 5.5 %

## 2020-07-22 RX ORDER — LEVOTHYROXINE SODIUM 0.05 MG/1
TABLET ORAL
Qty: 90 TABLET | Refills: 3 | Status: SHIPPED | OUTPATIENT
Start: 2020-07-22 | End: 2021-07-19

## 2020-07-22 NOTE — TELEPHONE ENCOUNTER
Medication:   Requested Prescriptions     Pending Prescriptions Disp Refills    levothyroxine (SYNTHROID) 50 MCG tablet [Pharmacy Med Name: LEVOTHYROXINE 50 MCG TABLET] 30 tablet 0     Sig: TAKE 1 TABLET BY MOUTH EVERY DAY        Last Filled:  6/19/2020 30 tabs 0 refills     Patient Phone Number: [phone] (home) [phone] (work)    Last appt: 6/22/2020   Next appt: Visit date not found    Last OARRS: No flowsheet data found.

## 2020-08-24 ENCOUNTER — OFFICE VISIT (OUTPATIENT)
Dept: DERMATOLOGY | Age: 50
End: 2020-08-24
Payer: COMMERCIAL

## 2020-08-24 VITALS — TEMPERATURE: 97.5 F

## 2020-08-24 PROCEDURE — 99214 OFFICE O/P EST MOD 30 MIN: CPT | Performed by: DERMATOLOGY

## 2020-08-24 RX ORDER — FLUOROURACIL 50 MG/G
CREAM TOPICAL
Qty: 40 G | Refills: 0 | Status: SHIPPED | OUTPATIENT
Start: 2020-08-24

## 2020-08-24 NOTE — PROGRESS NOTES
[facility]  Ezequiel Shaw MD  [address]  1970    52 y.o. female     Date of Visit: 2020    Chief Complaint: moles, f/u AK's  Chief Complaint   Patient presents with    Skin Exam     Last seen: 1 year ago  *her kids go to [facility] [employer], just graduated from [employer]    History of Present Illness:    1. Here for f/u for AK's. Here for full skin check. 1 persistent subtle concern on the R cheek  S/p bx of a persistent papule on the L medial cheek that was read as actinic keratosis and then treated with LN2. No probs with previous sites. She has a hx of sunburns, tanning bed use as a teen but wears sunscreen regularly over the past 10 years. 2. She has scattered asx pigmented lesions on the trunk and extremities, present for many years; no change in size/shape/color of any lesions; no bleeding lesions. 3. She still has waxing/waning intermittent dry patches on the chest and legs. They continue to spontaneously resolve and can be mildly pruritic. They do not bother her much. HC helps. Clear today. No family hx of psoriasis but she has 24 yo twin girls with eczema. They graduated [employer] .    4. F/u rosacea - has erythema and few papules on the face but no recent trx tried. Not bothersome. No personal or family hx of skin cancer. Review of Systems:  Gen: Feels well, good sense of health. Skin: No changing moles or lesions. MuscSkel: No joint pain. Past Medical History, Family History, Surgical History, Medications and Allergies reviewed.     Past Medical History:   Diagnosis Date    Allergy-induced asthma 2014    AR (allergic rhinitis)     Eczema     Morbid obesity with BMI of 40.0-44.9, adult ([facility] [address]     PCO (polycystic ovaries)     Unspecified hypothyroidism        Past Surgical History:   Procedure Laterality Date     SECTION      x3    CHOLECYSTECTOMY      SINUS SURGERY         Outpatient Medications ed/reassured  - OK to cont HC 1% cream or call if worsening for prescription trx    4.  Mild rosacea - ET and PP  - discussed treatment - start finacea or soolantra + oral abx if no improvement - will call if she wants the rx

## 2021-01-04 ENCOUNTER — NURSE TRIAGE (OUTPATIENT)
Dept: OTHER | Facility: CLINIC | Age: 51
End: 2021-01-04

## 2021-01-04 ENCOUNTER — TELEMEDICINE (OUTPATIENT)
Dept: FAMILY MEDICINE CLINIC | Age: 51
End: 2021-01-04
Payer: COMMERCIAL

## 2021-01-04 DIAGNOSIS — M54.42 ACUTE LEFT-SIDED LOW BACK PAIN WITH LEFT-SIDED SCIATICA: Primary | ICD-10-CM

## 2021-01-04 PROCEDURE — 99213 OFFICE O/P EST LOW 20 MIN: CPT | Performed by: FAMILY MEDICINE

## 2021-01-04 RX ORDER — METHYLPREDNISOLONE 4 MG/1
TABLET ORAL
Qty: 21 TABLET | Refills: 0 | Status: SHIPPED | OUTPATIENT
Start: 2021-01-04 | End: 2021-01-10

## 2021-01-04 ASSESSMENT — PATIENT HEALTH QUESTIONNAIRE - PHQ9
SUM OF ALL RESPONSES TO PHQ9 QUESTIONS 1 & 2: 0
SUM OF ALL RESPONSES TO PHQ QUESTIONS 1-9: 0

## 2021-01-04 NOTE — PROGRESS NOTES
2021    TELEHEALTH EVALUATION -- Audio/Visual (During HVEJT-52 public health emergency)    HPI:    Pelon Pedroza (:  1970) has requested an audio/video evaluation for the following concern(s):    C/o low back pain after sitting down 5 days ago. Radiates to buttock, mostly on L side. Similar to symptoms she has had in the past. Has done ice, heat, ibuprofen, not helping. Pain is miserable. Can't sleep, can't get comfortable. Changing positions is painful. Review of Systems:  Gen:  Denies fever, chills, headaches. No weight loss  HEENT:  Denies cold symptoms, sore throat. CV:  Denies chest pain or tightness, palpitations. Pulm:  Denies shortness of breath, cough. Abd:  Denies abdominal pain, change in bowel habits. Prior to Visit Medications    Medication Sig Taking? Authorizing Provider   methylPREDNISolone (MEDROL DOSEPACK) 4 MG tablet Take by mouth. Yes Hiren Carrington MD   metFORMIN (GLUCOPHAGE) 500 MG tablet TAKE 1 TABLET BY MOUTH TWICE A DAY Yes Hiren Carrington MD   fluorouracil (EFUDEX) 5 % cream Apply topically 2 times daily x 2-3 weeks. Avoid the eyes. Yes Sarah Dover MD   levothyroxine (SYNTHROID) 50 MCG tablet TAKE 1 TABLET BY MOUTH EVERY DAY Yes Marylee Humphrey, MD   budesonide-formoterol (SYMBICORT) 160-4.5 MCG/ACT AERO INHALE 2 PUFFS BY MOUTH TWICE DAILY Yes Hiren Carrington MD   albuterol sulfate HFA (VENTOLIN HFA) 108 (90 Base) MCG/ACT inhaler Inhale 2 puffs into the lungs every 6 hours as needed for Wheezing Yes Sathihs Xie MD   Vitamin D (CHOLECALCIFEROL) 1000 UNITS CAPS capsule Take 1,000 Units by mouth daily. Yes Historical Provider, MD   cetirizine (ZYRTEC) 10 MG tablet Take 10 mg by mouth daily. Yes Historical Provider, MD   therapeutic multivitamin-minerals (THERAGRAN-M) tablet Take 1 tablet by mouth daily.  Yes Historical Provider, MD       Past Medical History:   Diagnosis Date    Allergy-induced asthma 2014    AR (allergic rhinitis)  Eczema     Morbid obesity with BMI of 40.0-44.9, adult ([address]     PCO (polycystic ovaries)     Unspecified hypothyroidism        Past Surgical History:   Procedure Laterality Date     SECTION      x3    CHOLECYSTECTOMY      SINUS SURGERY         No family history on file. Allergies   Allergen Reactions    Bactrim     Vicodin [Hydrocodone-Acetaminophen]        Social History     Tobacco Use    Smoking status: Never Smoker    Smokeless tobacco: Never Used   Substance Use Topics    Alcohol use: Yes     Comment: occ    Drug use: No          PHYSICAL EXAMINATION:  Vital Signs: (As obtained by patient/caregiver or practitioner observation)  There were no vitals taken for this visit. Patient-Reported Vitals 2021   Patient-Reported Weight 250   Patient-Reported Height 5'4        Respiratory rate appears normal      Constitutional: Appears well-developed and well-nourished. No apparent distress    Mental status: Alert and awake. Oriented to person/place/time. Able to follow commands    Eyes: EOM normal. Sclera normal. No discharge visible  HENT: Normocephalic, atraumatic. Mouth/Throat: Mucous membranes are moist. External Ears Normal    Neck: No visualized mass   Pulmonary/Chest: Respiratory effort normal.  No visualized signs of difficulty breathing or respiratory distress        Musculoskeletal:  Normal range of motion of neck  Neurological:       No Facial Asymmetry (Cranial nerve 7 motor function) (limited exam to video visit). No gaze palsy       Skin:  No significant exanthematous lesions or discoloration noted on facial skin       Psychiatric: Normal Affect. No Hallucinations            ASSESSMENT/PLAN:  1. Acute left-sided low back pain with left-sided sciatica  - methylPREDNISolone (MEDROL DOSEPACK) 4 MG tablet; Take by mouth. Dispense: 21 tablet; Refill: 0  MRI L spine 2018 reviewed w patient- hx of L4-5 facet arthropathy.  Will refer to PT/spine if pain persists      No follow-ups on file. Ellsworth Severin is a 52 y.o. female being evaluated by a Virtual Visit (video visit) encounter to address concerns as mentioned above. A caregiver was present when appropriate. Due to this being a TeleHealth encounter (During XWEGA-68 public health emergency), evaluation of the following organ systems was limited: Vitals/Constitutional/EENT/Resp/CV/GI//MS/Neuro/Skin/Heme-Lymph-Imm. Pursuant to the emergency declaration under the [address] [place] [address], [address] [place] [address] authority and the [employer] Act, this Virtual Visit was conducted with patient's (and/or legal guardian's) consent, to reduce the patient's risk of exposure to COVID-19 and provide necessary medical care. The patient (and/or legal guardian) has also been advised to contact this office for worsening conditions or problems, and seek emergency medical treatment and/or call 911 if deemed necessary. Patient identification was verified at the start of the visit: Yes    Total time spent on this encounter: 10 minutes. Services were provided through a video synchronous discussion virtually to substitute for in-person clinic visit. Patient was located in their home. Provider was located in the office. --Adam Jaeger MD on 1/4/2021 at 1:10 PM    An electronic signature was used to authenticate this note. Gianfranco Oviedo

## 2021-01-04 NOTE — TELEPHONE ENCOUNTER
Cuidado del recién nacido  ([facility])  ¿QUÉ YVON SABER SOBRE EL BAÑO DE MI BEBÉ?   · Si limpia los derrames y la regurgitación, y mantiene la dolores del pañal limpia, el bebé solo necesita un baño de dos a vicente veces por semana.  · No le dé al bebé un baño de inmersión hasta que:  ¨ El cordón umbilical se haya caído y la piel del ombligo tenga un aspecto normal.  ¨ El lugar de la circuncisión se haya curado, en el dulce de los varones circuncidados. Hasta que esto ocurra, solo davis al bebé un baño con esponja.  · Escoja un momento del día en el que pueda relajarse y disfrutar de kahlil momento con diamond bebé. Evite el baño poco antes o después de alimentarlo.  · Nunca deje al bebé solo en felicita superficie elevada desde donde pueda caerse.  · Siempre sostenga al bebé con felicita mano mientras lo baña. Nunca deje al bebé solo en el agua.  · Para que el bebé no sienta frío, cúbralo con felicita toalla o un paño, excepto en las partes del cuerpo que esté limpiando con la esponja. Tenga felicita toalla preparada cerca para envolver al bebé inmediatamente después de bañarlo.  Pasos para bañar al bebé  · Lávese las armando con jabón y agua tibia.  · Prepare todos los elementos necesarios para el bebé. [place] incluye lo siguiente:  ¨ Felicita palangana con dos o vicente pulgadas (5,1 a 7,6 cm) de agua tibia. Siempre controle la temperatura del agua con el codo o la presley antes de bañar al bebé para asegurarse que no esté demasiado caliente.  ¨ Jabón y champú suaves para bebé.  ¨ Felicita taza para enjuagar.  ¨ Felicita toalla y toallita de mano suaves.  ¨ Torundas.  ¨ Ropa y mantas limpias.  ¨ Pañales.  · Comience el baño limpiando alrededor de cada nhan con felicita esquina distinta de la toallita o con torundas diferentes. Limpie suavemente desde el ángulo interno hasta el ángulo externo del nhan solo con agua limpia. No use jabón en la jez del bebé. A continuación, lave el zoltan de la jez del bebé con un paño limpio o felicita parte diferente de la toallita de  Reason for Disposition   Age > 48 and no history of prior similar back pain    Answer Assessment - Initial Assessment Questions  1. ONSET: \"When did the pain begin? \"       12/31/20    2. LOCATION: \"Where does it hurt? \" (upper, mid or lower back)    Lower    3. SEVERITY: \"How bad is the pain? \"  (e.g., Scale 1-10; mild, moderate, or severe)    - MILD (1-3): doesn't interfere with normal activities     - MODERATE (4-7): interferes with normal activities or awakens from sleep     - SEVERE (8-10): excruciating pain, unable to do any normal activities   Moderate now, but can be severe at times    4. PATTERN: \"Is the pain constant? \" (e.g., yes, no; constant, intermittent)   Constant    5. RADIATION: \"Does the pain shoot into your legs or elsewhere? \"    Back of legs and feet    6. CAUSE:  \"What do you think is causing the back pain? \"   unsure    7. BACK OVERUSE:  [place] recent lifting of heavy objects, strenuous work or exercise? \"  No    8. MEDICATIONS: \"What have you taken so far for the pain? \" (e.g., nothing, acetaminophen, NSAIDS)  advil    9. NEUROLOGIC SYMPTOMS: \"Do you have any weakness, numbness, or problems with bowel/bladder control? \"  Weakness and numbness in legs at times    10. OTHER SYMPTOMS: \"Do you have any other symptoms? \" (e.g., fever, abdominal pain, burning with urination, blood in urine)  No      11. PREGNANCY: \"Is there any chance you are pregnant? \" (e.g., yes, no; LMP)   no, yesterday    Protocols used: BACK PAIN-ADULT-[place]    Patient called pre-service center [facility] with red flag complaint. Brief description of triage: back pain    Triage indicates for patient to be seen today or tomorrow. Care advice provided, patient verbalizes understanding; denies any other questions or concerns; instructed to call back for any new or worsening symptoms. Writer provided warm transfer to [place] [facility] for appointment scheduling. Attention Provider:  Thank you for allowing me to mano.  · No use hisopos con punta de algodón para limpiar las orejas o la nariz. Solo lave los pliegues externos de las orejas y la nariz. Si se acumula moco en la nariz que usted puede aury, puede retorcer leland torunda húmeda y quitar el moco o utilizar leland autumn de goma con suavidad. Los hisopos con punta de algodón pueden lastimar la parte sensible en el interior de la nariz o las orejas.  · Para ale la farhana del bebé, sostenga la farhana y el marisol con leland mano. Humedezca el dhaval y luego aplique leland pequeña cantidad de champú para bebé. Enjuague dell el dhaval con leland toallita con agua tibia mientras se asegura de que el agua jabonosa no entre en los ojos del bebé. Si el bebé tiene manchas de piel escamosa en la farhana (costra láctea), afloje las escamas delicadamente con un cepillo suave o leland toallita de mano antes del enjuague.  · Siga lavando el zoltan del cuerpo y, por último, limpie la dolores del pañal. Limpie dentro y alrededor de arrugas y pliegues con delicadeza. Enjuague dell el jabón con agua para evitar la sequedad en la piel.  · Fili el baño, derrame agua tibia suavemente sobre el cuerpo del bebé para que no tome frío.  · Si es leland pam, limpie entre los pliegues de los labios vaginales con leland torunda empapada en agua. Asegúrese de limpiar de adelante hacia atrás leland wily vez con leland torunda.  ¨ Algunas bebas tienen leland secreción sanguinolenta que sale de la vagina. [place] se debe a un cambio hormonal repentino después del nacimiento. También puede presentarse leland secreción palomo. Ambas son normales y deberían desaparecer solas.  · Si es un varón, lave el pene delicadamente con agua tibia y leland torunda o leland toalla suave. Si el bebé no fue circuncidado, no tire el prepucio hacia atrás para limpiarlo. [place] ocasiona dolor. Solo limpie la piel externa. Si el bebé fue circuncidado, siga las instrucciones del pediatra sobre cómo limpiar el lugar de la circuncisión.  · Inmediatamente después del baño,  participate in the care of your patient. The patient was connected to triage in response to information provided to the E[facility]. Please do not respond through this encounter as the response is not directed to a shared pool. envuelva al bebé en leland toalla tibia.  ¿QUÉ YVON SABER SOBRE EL CUIDADO DEL CORDÓN UMBILICAL?   · El cordón umbilical debe caerse y sanar por sí solo a las dos o vicente semanas de long del bebé. No desprenda el muñón del cordón umbilical.  · Mantenga la dolores que rodea el cordón y el muñón limpia y seca.  ¨ Si el muñón umbilical se ensucia, se puede limpiar con agua. Séquelo dando golpecitos suaves con leland toalla limpia todo alrededor.  · Doble la parte delantera del pañal para que pueda secarse la base del cordón. De kahlil modo, se caerá más rápidamente.  · Es posible que observe un pequeña cantidad de secreción pegajosa o de venita antes de que caiga el muñón umbilical. [place] es normal.  ¿QUÉ YVON SABER SOBRE EL CUIDADO DE LA CIRCUNCISIÓN?   · Si diamond bebé fue circuncidado:  ¨ El pene puede estar envuelto en gasa con leland capa de vaselina. Si es así, retírela según las indicaciones del pediatra.  ¨ Lave suavemente el pene jorge se lo haya indicado el pediatra. Aplique vaselina en la punta del pene del bebé cada vez que le cambia el pañal, únicamente jorge se lo haya indicado el pediatra y hasta que la dolores haya sanado dell. Generalmente, la cicatrización tarda unos días.  · Si se realizó leland circuncisión con un anillo de plástico, lave y seque suavemente el pene jorge se lo haya indicado el pediatra. Aplique vaselina en el lugar de la circuncisión si se lo indicó el pediatra. El anillo de plástico en el extremo del pene se aflojará en los bordes y se caerá en leland o dos semanas después de la circuncisión. No desprenda el anillo.  ¨ Si el anillo de plástico no se cayó después de 14 días o si el pene se hincha o se observa leland secreción o sangrado grigsby brillante, llame al pediatra.  ¿QUÉ YVON SABER SOBRE LA PIEL DE MI BEBÉ?   · Es normal que las armando y los pies del bebé tengan un aspecto ligeramente azulado o grisáceo jazlyn las primeras semanas de long. No es normal que toda la jez o el cuerpo del bebé tengan un color  azulado o grisáceo.  · Los recién nacidos pueden tener manchas de nacimiento en el cuerpo. Consulte al pediatra sobre cualquier luis que encuentre.  · La piel del bebé a menudo se enrojece cuando llora.  · Es frecuente que la piel del bebé se descame jazlyn los primeros días de long. [place] se debe a un proceso de adaptación al aire seco fuera del útero.  · El acné del bebé es común en los primeros meses de long y, por lo general, no es necesario tratarlo.  · Algunas erupciones son comunes en los bebés recién nacidos. Consulte al pediatra sobre cualquier erupción que observe.  · La costra láctea es muy frecuente y no suele necesitar tratamiento.  · Puede aplicarle al bebé leland crema humectante para bebé en la piel después de bañarlo a fin de prevenir la piel seca y las erupciones cutáneas, jorge el eccema.  ¿QUÉ YVON SABER SOBRE LAS DEPOSICIONES DE MI BEBÉ?  · Las primeras deposiciones del bebé, también llamadas heces, son pegajosas y de color sukhwinder verdoso, y se las llama meconio.  · Las primeras heces del bebé normalmente ocurren dentro de las primeras 36 horas de long.  · Unos días después del nacimiento, cambian y pasan a ser heces blandas, de un color amarillo mostaza si lo amamanta, o a heces más espesas y de color amarillo amarronado si lo alimenta con leche maternizada. No obstante, las heces pueden ser feliciano, verdes o marrones.  · El bebé puede defecar cada vez que lo alimenta o de cuatro a orestes veces por día en las primeras semanas de long. Cada bebé es diferente.  · Después del primer mes, las heces de los bebés que se alimentan con leche materna suelen ser menos frecuentes y pueden ocurrir hasta menos de leland vez por día. Los bebés alimentados con leche maternizada tienden a defecar leland vez por día, jorge mínimo.  · Un bebé tiene diarrea si presenta gran cantidad de heces acuosas en un mismo día. Si el bebé tiene diarrea, es posible observar un anillo de agua que rodea las heces en el pañal. Consulte  al pediatra si diamond bebé tiene diarrea.  · El estreñimiento se caracteriza por heces duras que pueden ser difíciles de evacuar o parecen causar dolor. Sin embargo, la mayoría de los recién nacidos emiten gemidos y hacen esfuerzo al defecar. [place] es normal si las heces son blandas.  ¿QUÉ CONSEJOS YVON TENER EN CUENTA PARA EL CUIDADO DE MI BEBÉ EN GENERAL?   · Para dormir, coloque al bebé boca arriba. [place] es lo más importante que puede hacer para reducir el riesgo del síndrome de muerte súbita del lactante (SMSL).  ¨ No use ninguna almohada, ropa de cama suelta ni animales de dionne cuando acuesta al bebé.  · Si es posible, córtele las uñas de las armando y de los pies mientras duerme.  ¨ Comience a cortarle las uñas de las armando y de los pies solo después de observar leland separación dell definida entre la uña y la piel debajo de la uña.  · No es necesario vika la temperatura del bebé todos los esme. Hágalo solo cuando considere que la piel del bebé parece más caliente de lo habitual o si parece estar enfermo.  ¨ Utilice solo termómetros digitales. No use termómetros con carol.  ¨ Lubrique el termómetro con vaselina e introduzca el extremo aproximadamente media pulgada (1,27 cm) en el recto.  ¨ Sostenga el termómetro en el lugar jazlyn dos o vicente minutos o hasta que emita un pitido, mientras aprieta las nalgas del bebé.  · Lo enviarán a diamond casa con la autumn de goma desechable que usaron con diamond bebé. Úsela para extraer moco de la nariz si el bebé está congestionado.  ¨ Apriete la autumn, introduzca la punta suavemente en rach de los orificios nasales y permita que la autumn se expanda. Succionará el moco del orificio nasal.  ¨ Apriete la autumn de goma para eliminar el moco por el fregadero.  ¨ Repita el procedimiento en el otro orificio nasal.  ¨ Lave dell la autumn de goma con agua y jabón, y enjuáguela con abundante agua después de cada uso.  · Los bebés no regulan dell la temperatura corporal jazlyn los primeros meses de  long.No lo abrigue demasiado. Vístalo según el clima. [place] buena guía es vestirlo con leland capa más de ropa de la que a usted le resulta cómodo usar.  ¨ Si la piel del bebé se siente caliente y húmeda debido al sudor, está demasiado abrigado y puede estar incómodo. Quítele leland capa de ropa para que se refresque.  ¨ Si lo sigue sintiendo caliente, controle la temperatura. Comuníquese con el pediatra si el bebé tiene fiebre.  · Es romo que el bebé reciba aire fresco delfin evite llevarlo a áreas públicas donde haya mucha gente, por ejemplo, centros comerciales, hasta que tenga varias semanas de long. En las multitudes, el bebé puede estar expuesto a resfríos, virus y otras infecciones. Evite el contacto con personas que estén enfermas.  · Evite llevar al bebé a viajes de larga distancia, según se lo haya indicado el pediatra.  · No use un horno de microondas para calentar leche maternizada. El biberón permanece frío delfin la leche maternizada puede estar muy caliente. Recalentar la leche materna en un horno de microondas también reduce o elimina las propiedades inmunitarias naturales de la leche. Si es necesario, es mejor calentar la leche descongelada en un biberón dentro de leland cacerola con agua tibia. Siempre controle la temperatura de la leche en la parte interna de la presley antes de dársela al bebé.  · Lávese las armando con [place] y jabón después de cambiarle los pañales y después de ir al baño.  · Concurra a todas las visitas de control del bebé jorge se lo haya indicado el pediatra. [place] es importante.  ¿CUÁNDO YVON LLAMAR O CONCURRIR AL PEDIATRA?   · El muñón del cordón umbilical no se  y el bebé ya tiene vicente semanas de long.  · El bebé presenta enrojecimiento, hinchazón o leland secreción con olor fétido alrededor de la dolores umbilical.  · El bebé parece sentir dolor cuando le toca el abdomen.  · El bebé llora más de lo habitual o el llanto tiene un mariana o un maria g diferente.  · El bebé no se  alimenta.  · El bebé vomitó más de leland vez.  · El bebé tiene leland dermatitis del pañal que:  ¨ No desaparece después de vicente días de tratamiento.  ¨ Tiene llagas, pus o sangrado.  · El bebé no defecó en cuatro días o las heces son duras.  · Observa un color amarillo en la piel o la dolores palomo del nhan del bebé (ictericia).  · El bebé tiene leland erupción cutánea.  ¿CUÁNDO YVON LLAMAR  O CONCURRIR A LA CRISTELA DE EMERGENCIA?   · El bebé es greg de 3 meses y tiene fiebre de 100 °F (38 °C) o más.  · El bebé parece tener poca energía o estar menos activo o alerta de lo habitual cuando está despierto (letárgico).  · El bebé vomita de manera frecuente o compulsiva, o el vómito es martha y tiene venita.  · El bebé está sangrando activamente en el cordón umbilical o en el lugar de la circuncisión.  · El bebé tiene diarrea bryan o hay venita en las heces.  · El bebé tiene dificultad para respirar o parece dejar de respirar.  · El bebé presenta un color azulado o grisáceo en la piel, en otras partes del cuerpo además de las armando o los pies.     Esta información no tiene jorge fin reemplazar el consejo del médico. Asegúrese de hacerle al médico cualquier pregunta que tenga.     Document Released: 12/18/2006 Document Revised: 01/08/2016  Elsevier Interactive Patient Education ©2016 [employer]vier Inc.

## 2021-01-20 RX ORDER — BUDESONIDE AND FORMOTEROL FUMARATE DIHYDRATE 160; 4.5 UG/1; UG/1
AEROSOL RESPIRATORY (INHALATION)
Qty: 10.2 G | Refills: 3 | Status: SHIPPED | OUTPATIENT
Start: 2021-01-20 | End: 2021-08-02 | Stop reason: SDUPTHER

## 2021-01-20 NOTE — TELEPHONE ENCOUNTER
Medication:   Requested Prescriptions     Pending Prescriptions Disp Refills    budesonide-formoterol (SYMBICORT) 160-4.5 MCG/ACT AERO 10.2 g 3     Sig: INHALE 2 PUFFS BY MOUTH TWICE DAILY        Last Filled:  2/7/2019 10.2 g 3 refills    Patient Phone Number: [phone] (home) [phone] (work)    Last appt: 1/4/2021   Next appt: Visit date not found    Last OARRS: No flowsheet data found.

## 2021-01-20 NOTE — TELEPHONE ENCOUNTER
Medication and Quantity requested: budesonide-formoterol (SYMBICORT) 160-4.5 MCG/ACT AERO     Quantity 10.2 GM     Last Visit  1/4/21    Pharmacy and phone number updated in [email or web address]:  Yes [facility]

## 2021-06-09 DIAGNOSIS — E28.2 PCOS (POLYCYSTIC OVARIAN SYNDROME): ICD-10-CM

## 2021-06-18 DIAGNOSIS — E28.2 PCOS (POLYCYSTIC OVARIAN SYNDROME): ICD-10-CM

## 2021-06-18 NOTE — TELEPHONE ENCOUNTER
Medication:   Requested Prescriptions     Pending Prescriptions Disp Refills    metFORMIN (GLUCOPHAGE) 500 MG tablet 180 tablet 0        Last Filled:  6/9/2021 180 tabs 0 refils     Patient Phone Number: [phone] (home) [phone] (work)    Last appt: 1/4/2021   Next appt: Visit date not found    Last OARRS: No flowsheet data found.

## 2021-06-24 ENCOUNTER — TELEPHONE (OUTPATIENT)
Dept: DERMATOLOGY | Age: 51
End: 2021-06-24

## 2021-07-19 DIAGNOSIS — E03.9 HYPOTHYROIDISM, UNSPECIFIED TYPE: ICD-10-CM

## 2021-07-19 RX ORDER — LEVOTHYROXINE SODIUM 0.05 MG/1
TABLET ORAL
Qty: 90 TABLET | Refills: 0 | Status: SHIPPED | OUTPATIENT
Start: 2021-07-19 | End: 2021-10-18

## 2021-07-19 NOTE — TELEPHONE ENCOUNTER
Medication:   Requested Prescriptions     Pending Prescriptions Disp Refills    levothyroxine (SYNTHROID) 50 MCG tablet [Pharmacy Med Name: LEVOTHYROXINE 50 MCG TABLET] 90 tablet 3     Sig: TAKE 1 TABLET BY MOUTH EVERY DAY        Last Filled:  7/22/2020 #90 R3    Patient Phone Number: [phone] (home) [phone] (work)    Last appt: 1/4/2021   Next appt: Visit date not found    Last OARRS: No flowsheet data found.

## 2021-08-02 RX ORDER — BUDESONIDE AND FORMOTEROL FUMARATE DIHYDRATE 160; 4.5 UG/1; UG/1
AEROSOL RESPIRATORY (INHALATION)
Qty: 10.2 G | Refills: 3 | Status: SHIPPED | OUTPATIENT
Start: 2021-08-02 | End: 2021-11-27 | Stop reason: SDUPTHER

## 2021-08-02 NOTE — TELEPHONE ENCOUNTER
Medication:   Requested Prescriptions     Pending Prescriptions Disp Refills    budesonide-formoterol (SYMBICORT) 160-4.5 MCG/ACT AERO 10.2 g 3     Sig: INHALE 2 PUFFS BY MOUTH TWICE DAILY        Last Filled:  1/20/21 10.2g3rf    Patient Phone Number: [phone] (home) [phone] (work)    Last appt: 1/4/2021   Next appt: 8/9/2021    Last OARRS: No flowsheet data found.

## 2021-08-09 ENCOUNTER — OFFICE VISIT (OUTPATIENT)
Dept: FAMILY MEDICINE CLINIC | Age: 51
End: 2021-08-09
Payer: COMMERCIAL

## 2021-08-09 ENCOUNTER — HOSPITAL ENCOUNTER (OUTPATIENT)
Dept: MAMMOGRAPHY | Age: 51
Discharge: HOME | End: 2021-08-09
Payer: COMMERCIAL

## 2021-08-09 VITALS
HEART RATE: 78 BPM | HEIGHT: 64 IN | OXYGEN SATURATION: 98 % | DIASTOLIC BLOOD PRESSURE: 84 MMHG | WEIGHT: 256 LBS | SYSTOLIC BLOOD PRESSURE: 132 MMHG | BODY MASS INDEX: 43.71 KG/M2

## 2021-08-09 VITALS — BODY MASS INDEX: 42.68 KG/M2 | HEIGHT: 64 IN | WEIGHT: 250 LBS

## 2021-08-09 DIAGNOSIS — E28.2 PCOS (POLYCYSTIC OVARIAN SYNDROME): ICD-10-CM

## 2021-08-09 DIAGNOSIS — Z12.31 ENCOUNTER FOR SCREENING MAMMOGRAM FOR BREAST CANCER: ICD-10-CM

## 2021-08-09 DIAGNOSIS — Z12.11 COLON CANCER SCREENING: ICD-10-CM

## 2021-08-09 DIAGNOSIS — J45.40 MODERATE PERSISTENT EXTRINSIC ASTHMA WITHOUT COMPLICATION: ICD-10-CM

## 2021-08-09 DIAGNOSIS — E78.5 HYPERLIPIDEMIA, UNSPECIFIED HYPERLIPIDEMIA TYPE: ICD-10-CM

## 2021-08-09 DIAGNOSIS — E03.9 ACQUIRED HYPOTHYROIDISM: ICD-10-CM

## 2021-08-09 DIAGNOSIS — Z00.00 ANNUAL PHYSICAL EXAM: Primary | ICD-10-CM

## 2021-08-09 PROCEDURE — 99396 PREV VISIT EST AGE 40-64: CPT | Performed by: FAMILY MEDICINE

## 2021-08-09 PROCEDURE — 77063 BREAST TOMOSYNTHESIS BI: CPT

## 2021-08-09 SDOH — ECONOMIC STABILITY: FOOD INSECURITY: WITHIN THE PAST 12 MONTHS, YOU WORRIED THAT YOUR FOOD WOULD RUN OUT BEFORE YOU GOT MONEY TO BUY MORE.: NEVER TRUE

## 2021-08-09 SDOH — ECONOMIC STABILITY: FOOD INSECURITY: WITHIN THE PAST 12 MONTHS, THE FOOD YOU BOUGHT JUST DIDN'T LAST AND YOU DIDN'T HAVE MONEY TO GET MORE.: NEVER TRUE

## 2021-08-09 ASSESSMENT — SOCIAL DETERMINANTS OF HEALTH (SDOH): HOW HARD IS IT FOR YOU TO PAY FOR THE VERY BASICS LIKE FOOD, HOUSING, MEDICAL CARE, AND HEATING?: NOT HARD AT ALL

## 2021-08-09 NOTE — PROGRESS NOTES
Activity    Alcohol use: Yes     Comment: occ    Drug use: No    Sexual activity: Yes     Partners: Male     Comment:    Other Topics Concern    Not on file   Social History Narrative    Not on file     Social Determinants of Health     Financial Resource Strain: Low Risk     Difficulty of Paying Living Expenses: Not hard at all   Food Insecurity: No Food Insecurity    Worried About Running Out of Food in the Last Year: Never true    [address] in the Last Year: Never true   Transportation Needs:     Lack of Transportation (Medical):      Lack of Transportation (Non-Medical):    Physical Activity:     Days of Exercise per Week:     Minutes of Exercise per Session:    Stress:     Feeling of Stress :    Social Connections:     Frequency of Communication with Friends and Family:     Frequency of Social Gatherings with Friends and Family:     Attends Religion Services:     Active Member of Clubs or Organizations:     Attends Club or Organization Meetings:     Marital Status:    Intimate Partner Violence:     Fear of Current or Ex-Partner:     Emotionally Abused:     Physically Abused:     Sexually Abused:        Family History   Problem Relation Age of Onset    Breast Cancer Maternal Grandmother          Health Maintenance   Topic Date Due    Hepatitis C screen  Never done    Colon cancer screen colonoscopy  Never done    Cervical cancer screen  01/20/2018    TSH testing  07/02/2021    Pneumococcal 0-64 years Vaccine (1 of 2 - PPSV23) 06/14/2030 (Originally 4/14/1976)    Flu vaccine (1) 09/01/2021    Diabetes screen  07/02/2023    DTaP/Tdap/Td vaccine (2 - Td or Tdap) 02/25/2024    Lipid screen  07/02/2025    COVID-19 Vaccine  Completed    Hepatitis A vaccine  Aged Out    Hepatitis B vaccine  Aged Out    Hib vaccine  Aged Out    Meningococcal (ACWY) vaccine  Aged Out    HIV screen  Discontinued         Review Of Systems:  Skin: no changing moles, abnormal pigmentation, rash, scaling, itching, masses, hair or nail changes  Eyes: no blurring, diplopia, or eye pain  Ears/Nose/Throat: no hearing loss, tinnitus, vertigo, nosebleed, nasal congestion, rhinorrhea, sore throat  Respiratory: no cough, pleuritic chest pain, dyspnea, or wheezing  Cardiovascular: no angina, RUDOLPH, orthopnea, PND, palpitations, or claudication  Gastrointestinal: no nausea, vomiting, heartburn, diarrhea, constipation, bloating, or abdominal pain  Genitourinary: no urinary urgency, frequency, dysuria, nocturia, hesitancy, or incontinence  Musculoskeletal: no arthritis, arthralgia, myalgia, weakness, or morning stiffness  Neurologic: no paralysis, paresis, paresthesia, seizures, tremors, or headaches  Hematologic/Lymphatic/Immunologic: no anemia, abnormal bleeding/bruising, fever, chills, night sweats, swollen glands, or unexplained weight loss  Endocrine: no heat or cold intolerance and no polyphagia, polydipsia, or polyuria    PHYSICAL EXAMINATION:  /84 (Site: Right Upper Arm, Position: Sitting, Cuff Size: Medium Adult)   Pulse 78   Ht 5' 4\" (1.626 m)   Wt 256 lb (116.1 kg)   LMP 07/25/2021 (Exact Date)   SpO2 98%   BMI 43.94 kg/m²   General appearance: healthy, alert, no distress  Skin: Skin color, texture, turgor normal. No rashes or suspicious lesions. No induration or tightening palpated. Head: Normocephalic. No masses, lesions, tenderness or abnormalities  Eyes: Conjunctivae/corneas clear. PERRL, EOM's intact. Ears: External ears normal. Canals clear. TM's clear bilaterally. Hearing grossly normal.  Nose/Sinuses: Nares normal.   Oropharynx: Lips, mucosa, and tongue normal. Teeth and gums normal. Oropharynx clear with no exudate seen. Neck: Neck supple, and symmetric. No adenopathy. Thyroid symmetric, normal size, without nodule. Trachea is midline. Back: Back symmetric, no curvature. ROM normal. No CVA tenderness. Lungs: Good diaphragmatic excursion. Lungs clear to auscultation bilaterally. No retractions or use of accessory muscles. Heart: PMI is not displaced, and no thrill noted. Regular rate and rhythm, with no rub, murmur or gallop noted. Breasts: Exam deferred to OB/GYN  Abdomen: Abdomen soft, non-tender. BS normal. No masses, organomegaly. No hernia noted. Extremities: Extremities normal. No deformities, edema, or skin discoloration. No cyanosis or clubbing noted to the nails. Hands and feet were warm and well-perfused with palpable dorsalis pedis pulses bilaterally. Lymph: No lymphadenopathy of the neck or supraclavicular regions. Musculoskeletal: Spine ROM normal. Muscular strength intact. Neuro: Cranial nerves intact, gait normal. No focal weakness. Pelvic: Exam deferred to OB/GYN      ASSESSMENT/PLAN:  1. Annual physical exam  - CBC Auto Differential; Future  - Comprehensive Metabolic Panel; Future  - Lipid Panel; Future  - TSH without Reflex; Future  - Hemoglobin A1C; Future    2. Acquired hypothyroidism  - TSH without Reflex; Future    3. Hyperlipidemia, unspecified hyperlipidemia type  - Lipid Panel; Future    4. PCOS (polycystic ovarian syndrome)  - Hemoglobin A1C; Future    5. Colon cancer screening  - Amb External Referral To Gastroenterology    6. Moderate persistent extrinsic asthma without complication  stable        All health maintenance issues were updated.   Recommend begin progressive daily aerobic exercise program, follow a low fat, low cholesterol diet and attempt to lose weight

## 2021-09-14 DIAGNOSIS — E28.2 PCOS (POLYCYSTIC OVARIAN SYNDROME): ICD-10-CM

## 2021-10-17 DIAGNOSIS — E03.9 HYPOTHYROIDISM, UNSPECIFIED TYPE: ICD-10-CM

## 2021-10-18 RX ORDER — LEVOTHYROXINE SODIUM 0.05 MG/1
TABLET ORAL
Qty: 90 TABLET | Refills: 0 | Status: SHIPPED | OUTPATIENT
Start: 2021-10-18 | End: 2022-01-17

## 2021-10-18 NOTE — TELEPHONE ENCOUNTER
Medication:   Requested Prescriptions     Pending Prescriptions Disp Refills    levothyroxine (SYNTHROID) 50 MCG tablet [Pharmacy Med Name: LEVOTHYROXINE 50 MCG TABLET] 90 tablet 0     Sig: TAKE 1 TABLET BY MOUTH EVERY DAY       Last appt: 8/9/2021   Next appt: 10/18/2021    Last Thyroid:   Lab Results   Component Value Date    TSH 2.90 07/02/2020    T4FREE 1.4 03/30/2017    F6JALXC 7.5 02/21/2014

## 2021-10-25 ENCOUNTER — OFFICE VISIT (OUTPATIENT)
Dept: DERMATOLOGY | Age: 51
End: 2021-10-25
Payer: COMMERCIAL

## 2021-10-25 VITALS — TEMPERATURE: 97.1 F

## 2021-10-25 DIAGNOSIS — D18.01 HEMANGIOMA OF SKIN: ICD-10-CM

## 2021-10-25 DIAGNOSIS — L81.4 LENTIGINES: ICD-10-CM

## 2021-10-25 DIAGNOSIS — D22.9 MULTIPLE NEVI: ICD-10-CM

## 2021-10-25 DIAGNOSIS — L40.9 PSORIASIS: ICD-10-CM

## 2021-10-25 DIAGNOSIS — L71.9 ROSACEA: ICD-10-CM

## 2021-10-25 DIAGNOSIS — L57.0 AK (ACTINIC KERATOSIS): Primary | ICD-10-CM

## 2021-10-25 PROCEDURE — 99214 OFFICE O/P EST MOD 30 MIN: CPT | Performed by: DERMATOLOGY

## 2021-10-25 NOTE — PROGRESS NOTES
[facility]  Alisia Alvarez MD  [address]  1970    54 y.o. female     Date of Visit: 10/25/2021    Chief Complaint: moles, f/u AK's  Chief Complaint   Patient presents with    Skin Exam     FSE       HX:AK     Last seen: ~1 year ago  *her kids went to [place], just graduated from [employer]    History of Present Illness:    1. Here for f/u for AK's. Here for full skin check. 1 new on the nose. No bleeding. Lesion on the R cheek treated with efudex  S/p bx of a persistent papule on the L medial cheek that was read as actinic keratosis and then treated with LN2. No probs with previous sites. She has a hx of sunburns, tanning bed use as a teen but wears sunscreen regularly over the past 10 years. 2. She has scattered asx pigmented lesions on the trunk and extremities, present for many years; no change in size/shape/color of any lesions; no bleeding lesions. 3. She still has waxing/waning intermittent dry patches on the chest and legs - psoriasis vs dermatitis. They continue to spontaneously resolve and can be mildly pruritic. They do not bother her much. HC helps. Clear today. No family hx of psoriasis but she has twin girls with eczema. They graduated [employer] .    4. F/u rosacea - has erythema and few papules on the face but no recent trx tried. Not bothersome. No personal or family hx of skin cancer. Review of Systems:  Gen: Feels well, good sense of health. Skin: No changing moles or lesions. MuscSkel: No joint pain. Past Medical History, Family History, Surgical History, Medications and Allergies reviewed.     Past Medical History:   Diagnosis Date    Allergy-induced asthma 2014    AR (allergic rhinitis)     Eczema     Morbid obesity with BMI of 40.0-44.9, adult ([address]     PCO (polycystic ovaries)     Unspecified hypothyroidism        Past Surgical History:   Procedure Laterality Date     SECTION      x3  CHOLECYSTECTOMY  4/02    SINUS SURGERY  1994       Outpatient Medications Marked as Taking for the 10/25/21 encounter (Office Visit) with Jeny Harrsi MD   Medication Sig Dispense Refill    levothyroxine (SYNTHROID) 50 MCG tablet TAKE 1 TABLET BY MOUTH EVERY DAY 90 tablet 0    metFORMIN (GLUCOPHAGE) 500 MG tablet Take 1 tablet by mouth 2 times daily (with meals) 180 tablet 0    budesonide-formoterol (SYMBICORT) 160-4.5 MCG/ACT AERO INHALE 2 PUFFS BY MOUTH TWICE DAILY 10.2 g 3    albuterol sulfate HFA (VENTOLIN HFA) 108 (90 Base) MCG/ACT inhaler Inhale 2 puffs into the lungs every 6 hours as needed for Wheezing 1 Inhaler 3    Vitamin D (CHOLECALCIFEROL) 1000 UNITS CAPS capsule Take 1,000 Units by mouth daily.  cetirizine (ZYRTEC) 10 MG tablet Take 10 mg by mouth daily.  therapeutic multivitamin-minerals (THERAGRAN-M) tablet Take 1 tablet by mouth daily. Allergies   Allergen Reactions    Bactrim     Vicodin [Hydrocodone-Acetaminophen]          Physical Examination     Gen, well-appearing    FSE today    R cheek clear  Nasal tip with erythematous roughly scaled macule   trunk and extremities with scattered brown macules and papules  R shoulder with silvery scaled pink macule  Cheeks, nose and FH with scattered erythematous small macules and papules and few tiny pustules with background erythema and telangiectasias    Assessment and Plan     1. AK - 1 new on the nose  - discussed bx vs LN2  - rtn for cryotrx in a few weeks (has gala this weekend) - risk scar, hypopigmentation, recurrence  - cont sun protection    2. Benign-appearing nevi and lentigines  - educ re ABCD's of MM   educ sun protection SPF 30+  encouraged skin check yearly (sooner if indicated), self checks    3. psoriasis vs dermatitis - focal area on the R shoulder flaring  - ed/reassured  - OK to cont HC 1% cream or call if worsening for prescription trx    4.  Mild rosacea - ET and PP  - discussed treatment - start finacea or soolantra + oral abx if no improvement - will call if she wants the rx      *R jawline - angioma - reassured

## 2021-11-29 RX ORDER — BUDESONIDE AND FORMOTEROL FUMARATE DIHYDRATE 160; 4.5 UG/1; UG/1
AEROSOL RESPIRATORY (INHALATION)
Qty: 10.2 G | Refills: 3 | Status: SHIPPED | OUTPATIENT
Start: 2021-11-29 | End: 2022-04-23 | Stop reason: SDUPTHER

## 2021-11-29 NOTE — TELEPHONE ENCOUNTER
Medication:   Requested Prescriptions     Pending Prescriptions Disp Refills    budesonide-formoterol (SYMBICORT) 160-4.5 MCG/ACT AERO 10.2 g 3     Sig: INHALE 2 PUFFS BY MOUTH TWICE DAILY        Last Filled:  8/2/2021 10.2 g Refills 3    Patient Phone Number: [phone] (home) [phone] (work)    Last appt: 8/9/2021   Next appt: Visit date not found    Last OARRS: No flowsheet data found.

## 2021-11-30 ENCOUNTER — TELEMEDICINE (OUTPATIENT)
Dept: FAMILY MEDICINE CLINIC | Age: 51
End: 2021-11-30
Payer: COMMERCIAL

## 2021-11-30 DIAGNOSIS — J06.9 VIRAL URI: Primary | ICD-10-CM

## 2021-11-30 PROCEDURE — 99213 OFFICE O/P EST LOW 20 MIN: CPT | Performed by: FAMILY MEDICINE

## 2021-11-30 SDOH — ECONOMIC STABILITY: INCOME INSECURITY: HOW HARD IS IT FOR YOU TO PAY FOR THE VERY BASICS LIKE FOOD, HOUSING, MEDICAL CARE, AND HEATING?: NOT HARD AT ALL

## 2021-11-30 SDOH — HEALTH STABILITY: MENTAL HEALTH
STRESS IS WHEN SOMEONE FEELS TENSE, NERVOUS, ANXIOUS, OR CAN'T SLEEP AT NIGHT BECAUSE THEIR MIND IS TROUBLED. HOW STRESSED ARE YOU?: NOT AT ALL

## 2021-11-30 SDOH — ECONOMIC STABILITY: TRANSPORTATION INSECURITY
IN THE PAST 12 MONTHS, HAS LACK OF TRANSPORTATION KEPT YOU FROM MEETINGS, WORK, OR FROM GETTING THINGS NEEDED FOR DAILY LIVING?: NO

## 2021-11-30 SDOH — ECONOMIC STABILITY: FOOD INSECURITY: WITHIN THE PAST 12 MONTHS, THE FOOD YOU BOUGHT JUST DIDN'T LAST AND YOU DIDN'T HAVE MONEY TO GET MORE.: NEVER TRUE

## 2021-11-30 SDOH — HEALTH STABILITY: PHYSICAL HEALTH: ON AVERAGE, HOW MANY MINUTES DO YOU ENGAGE IN EXERCISE AT THIS LEVEL?: 50 MIN

## 2021-11-30 SDOH — SOCIAL STABILITY: SOCIAL NETWORK
DO YOU BELONG TO ANY CLUBS OR ORGANIZATIONS SUCH AS CHURCH GROUPS UNIONS, FRATERNAL OR ATHLETIC GROUPS, OR SCHOOL GROUPS?: YES

## 2021-11-30 SDOH — SOCIAL STABILITY: SOCIAL NETWORK: ARE YOU MARRIED, WIDOWED, DIVORCED, SEPARATED, NEVER MARRIED, OR LIVING WITH A PARTNER?: MARRIED

## 2021-11-30 SDOH — SOCIAL STABILITY: SOCIAL NETWORK: HOW OFTEN DO YOU ATTEND CHURCH OR RELIGIOUS SERVICES?: MORE THAN 4 TIMES PER YEAR

## 2021-11-30 SDOH — ECONOMIC STABILITY: FOOD INSECURITY: WITHIN THE PAST 12 MONTHS, YOU WORRIED THAT YOUR FOOD WOULD RUN OUT BEFORE YOU GOT MONEY TO BUY MORE.: NEVER TRUE

## 2021-11-30 SDOH — HEALTH STABILITY: PHYSICAL HEALTH: ON AVERAGE, HOW MANY DAYS PER WEEK DO YOU ENGAGE IN MODERATE TO STRENUOUS EXERCISE (LIKE A BRISK WALK)?: 2 DAYS

## 2021-11-30 SDOH — SOCIAL STABILITY: SOCIAL NETWORK
IN A TYPICAL WEEK, HOW MANY TIMES DO YOU TALK ON THE PHONE WITH FAMILY, FRIENDS, OR NEIGHBORS?: MORE THAN THREE TIMES A WEEK

## 2021-11-30 SDOH — ECONOMIC STABILITY: HOUSING INSECURITY
IN THE LAST 12 MONTHS, WAS THERE A TIME WHEN YOU DID NOT HAVE A STEADY PLACE TO SLEEP OR SLEPT IN A SHELTER (INCLUDING NOW)?: NO

## 2021-11-30 SDOH — HEALTH STABILITY: MENTAL HEALTH: HOW OFTEN DO YOU HAVE A DRINK CONTAINING ALCOHOL?: 2-4 TIMES A MONTH

## 2021-11-30 SDOH — ECONOMIC STABILITY: INCOME INSECURITY: IN THE LAST 12 MONTHS, WAS THERE A TIME WHEN YOU WERE NOT ABLE TO PAY THE MORTGAGE OR RENT ON TIME?: NO

## 2021-11-30 SDOH — ECONOMIC STABILITY: TRANSPORTATION INSECURITY
IN THE PAST 12 MONTHS, HAS THE LACK OF TRANSPORTATION KEPT YOU FROM MEDICAL APPOINTMENTS OR FROM GETTING MEDICATIONS?: NO

## 2021-11-30 SDOH — SOCIAL STABILITY: SOCIAL NETWORK: HOW OFTEN DO YOU GET TOGETHER WITH FRIENDS OR RELATIVES?: MORE THAN THREE TIMES A WEEK

## 2021-11-30 SDOH — SOCIAL STABILITY: SOCIAL NETWORK: HOW OFTEN DO YOU ATTENT MEETINGS OF THE CLUB OR ORGANIZATION YOU BELONG TO?: MORE THAN 4 TIMES PER YEAR

## 2021-11-30 SDOH — ECONOMIC STABILITY: HOUSING INSECURITY: IN THE LAST 12 MONTHS, HOW MANY PLACES HAVE YOU LIVED?: 1

## 2021-11-30 SDOH — HEALTH STABILITY: MENTAL HEALTH: HOW MANY STANDARD DRINKS CONTAINING ALCOHOL DO YOU HAVE ON A TYPICAL DAY?: 1 OR 2

## 2021-11-30 NOTE — PROGRESS NOTES
Chief complaint: Cough (productive, yelow, brown since Friday), Congestion, and Sinus Problem      SUBJECTIVE:  HPI  Aubrey Davenport (:  1970) is a 54 y.o. female with a past medical history of allergy-induced asthma, hypothyroidism and PCOS who presents with a chief complaint of: 4 days of cough, congestion and sinus congestion. vaccinated against covid. No known covid contacts. She is using symbicort 2 puffs bid and albuterol daily prn to help with cough. No sob or gates. Doesn't feel her asthma is bad right now. Review of Systems:  General: No F/C/NS/fatigue/wt loss   Cardiovascular: No CP  Respiratory: No SOB  GI: No N/V/D/C/abd pain/blood in stool  Neuro: No HA/weakness  Psych: No depressed mood/anxiety  Musculoskeletal: No myalgias    Current Outpatient Medications on File Prior to Visit   Medication Sig Dispense Refill    DM-Doxylamine-Acetaminophen (NYQUIL COLD & FLU PO) Take by mouth      budesonide-formoterol (SYMBICORT) 160-4.5 MCG/ACT AERO INHALE 2 PUFFS BY MOUTH TWICE DAILY 10.2 g 3    levothyroxine (SYNTHROID) 50 MCG tablet TAKE 1 TABLET BY MOUTH EVERY DAY 90 tablet 0    metFORMIN (GLUCOPHAGE) 500 MG tablet Take 1 tablet by mouth 2 times daily (with meals) 180 tablet 0    albuterol sulfate HFA (VENTOLIN HFA) 108 (90 Base) MCG/ACT inhaler Inhale 2 puffs into the lungs every 6 hours as needed for Wheezing 1 Inhaler 3    Vitamin D (CHOLECALCIFEROL) 1000 UNITS CAPS capsule Take 1,000 Units by mouth daily.  cetirizine (ZYRTEC) 10 MG tablet Take 10 mg by mouth daily.  therapeutic multivitamin-minerals (THERAGRAN-M) tablet Take 1 tablet by mouth daily.  fluorouracil (EFUDEX) 5 % cream Apply topically 2 times daily x 2-3 weeks. Avoid the eyes. (Patient not taking: Reported on 2021) 40 g 0     No current facility-administered medications on file prior to visit. OBJECTIVE:  There were no vitals taken for this visit.      Physical Exam  Constitutional:       General: Not in acute distress. Appearance: Normal appearance. Not ill-appearing. HENT:      Head: Normocephalic and atraumatic. Nose: Nose normal.   Pulmonary:      Effort: Pulmonary effort is normal. No respiratory distress. No cough  Neurological:      General: No focal deficit present. Mental Status: Alert. Psychiatric:         Mood and Affect: Mood normal.         Behavior: Behavior normal.        ASSESSMENT/PLAN:  1. Viral URI  New, uncontrolled. No respiratory distress, evidence of moderate to severe range dehydration, or worrisome vital signs. DDx: Most likely viral. Evidence does not strongly suggest PNA, sinusitis, strep. Do not recommend antibiotics at this time. Recommend conservative therapy. -- sinus rinses bid prn  -- Honey prn cough  -- Motrin, Sudafed, afrin prn; can continue  nyquil and dayquil if desired  --continue symbicort and MARCELA. Call clinic if SOB occurs or go to ED if can't get same day apt  - Discussed adequate rest, hand washing, and hydration with water and soup for symptomatic improvement   - Pt. told to expect cough to resolve slowly over one month     -f/u in clinic if sx persist greater than 14 days or sx get better and worse again, this may be an indication of bacterial coinfection and she'll need to be seen for eval of abx therapy. Pt agrees with plan    Return if symptoms worsen or fail to improve. Electronically signed by Jasmine Corcoran MD on 11/30/2021 at 4:24 PM.     Please note, portions of this note were completed with a voice recognition program.  Although every effort was made to ensure the accuracy of this automated transcription, some errors in transcription may have occurred.

## 2021-12-07 ENCOUNTER — TELEPHONE (OUTPATIENT)
Dept: FAMILY MEDICINE CLINIC | Age: 51
End: 2021-12-07

## 2021-12-07 NOTE — TELEPHONE ENCOUNTER
Pt had VV last week, URI still feeling bad, stuffy,drainage. She has to get on a plane Friday morning.

## 2021-12-08 DIAGNOSIS — B96.89 ACUTE BACTERIAL SINUSITIS: Primary | ICD-10-CM

## 2021-12-08 DIAGNOSIS — J01.90 ACUTE BACTERIAL SINUSITIS: Primary | ICD-10-CM

## 2021-12-08 RX ORDER — AMOXICILLIN AND CLAVULANATE POTASSIUM 875; 125 MG/1; MG/1
1 TABLET, FILM COATED ORAL 2 TIMES DAILY
Qty: 10 TABLET | Refills: 0 | Status: SHIPPED | OUTPATIENT
Start: 2021-12-08 | End: 2021-12-13

## 2021-12-08 NOTE — TELEPHONE ENCOUNTER
Day 13 of URI. Most likely bacterial sinusitis at this point. Rx placed for augmentin x5 days to [facility] on [address]. Please encourage pt to take probiotics at lunch time (not at the same time as taking the antibiotic) to help prevent antiobiotic associated diarrhea. Her symptoms should be better prior to her flight on Friday. She may need ibuprofen or tylenol to help with the pressure and inflammation from the illness when she flies. Thank you!

## 2021-12-10 DIAGNOSIS — E28.2 PCOS (POLYCYSTIC OVARIAN SYNDROME): ICD-10-CM

## 2021-12-10 NOTE — TELEPHONE ENCOUNTER
Medication:   Requested Prescriptions     Pending Prescriptions Disp Refills    metFORMIN (GLUCOPHAGE) 500 MG tablet [Pharmacy Med Name: METFORMIN  MG TABLET] 180 tablet 0     Sig: TAKE 1 TABLET BY MOUTH TWICE A DAY WITH MEALS       Last Filled:  9/15/2021 #180 Refills 0    Patient Phone Number: [phone] (home) [phone] (work)    Last appt: 11/30/2021   Next appt: Visit date not found    Last Labs DM:   Lab Results   Component Value Date    LABA1C 5.5 07/02/2020

## 2022-01-16 DIAGNOSIS — E03.9 HYPOTHYROIDISM, UNSPECIFIED TYPE: ICD-10-CM

## 2022-01-17 RX ORDER — LEVOTHYROXINE SODIUM 0.05 MG/1
TABLET ORAL
Qty: 90 TABLET | Refills: 3 | Status: SHIPPED | OUTPATIENT
Start: 2022-01-17 | End: 2022-04-23 | Stop reason: SDUPTHER

## 2022-01-17 NOTE — TELEPHONE ENCOUNTER
Medication:   Requested Prescriptions     Pending Prescriptions Disp Refills    levothyroxine (SYNTHROID) 50 MCG tablet [Pharmacy Med Name: LEVOTHYROXINE 50 MCG TABLET] 90 tablet 0     Sig: TAKE 1 TABLET BY MOUTH EVERY DAY       Last Filled:  10/18/2021 #90 Refills 0    Patient Phone Number: [phone] (home) [phone] (work)    Last appt: 11/30/2021   Next appt: Visit date not found    Last Thyroid:   Lab Results   Component Value Date    TSH 2.90 07/02/2020    T4FREE 1.4 03/30/2017    I5PJPMI 7.5 02/21/2014

## 2022-04-23 DIAGNOSIS — E03.9 HYPOTHYROIDISM, UNSPECIFIED TYPE: ICD-10-CM

## 2022-04-25 RX ORDER — BUDESONIDE AND FORMOTEROL FUMARATE DIHYDRATE 160; 4.5 UG/1; UG/1
AEROSOL RESPIRATORY (INHALATION)
Qty: 10.2 G | Refills: 3 | Status: SHIPPED | OUTPATIENT
Start: 2022-04-25

## 2022-04-25 RX ORDER — LEVOTHYROXINE SODIUM 0.05 MG/1
TABLET ORAL
Qty: 90 TABLET | Refills: 3 | Status: SHIPPED | OUTPATIENT
Start: 2022-04-25 | End: 2022-07-26 | Stop reason: SDUPTHER

## 2022-04-25 NOTE — TELEPHONE ENCOUNTER
Medication:   Requested Prescriptions     Pending Prescriptions Disp Refills    budesonide-formoterol (SYMBICORT) 160-4.5 MCG/ACT AERO 10.2 g 3     Sig: INHALE 2 PUFFS BY MOUTH TWICE DAILY        Last Filled:  11/29/2021 10.2 g 3 refills     Patient Phone Number: [phone] (home) [phone] (work)    Last appt: 11/30/2021   Next appt: Visit date not found    Last OARRS: No flowsheet data found.

## 2022-04-25 NOTE — TELEPHONE ENCOUNTER
Medication:   Requested Prescriptions     Pending Prescriptions Disp Refills    levothyroxine (SYNTHROID) 50 MCG tablet 90 tablet 3        Last Filled:  1/17/2022 90 tabs 3 refills new pharmacy     Patient Phone Number: [phone] (home) [phone] (work)    Last appt: 11/30/2021   Next appt: 4/23/2022    Last OARRS: No flowsheet data found.

## 2022-05-26 DIAGNOSIS — E28.2 PCOS (POLYCYSTIC OVARIAN SYNDROME): ICD-10-CM

## 2022-07-22 DIAGNOSIS — E28.2 PCOS (POLYCYSTIC OVARIAN SYNDROME): ICD-10-CM

## 2022-07-22 DIAGNOSIS — Z00.00 ANNUAL PHYSICAL EXAM: ICD-10-CM

## 2022-07-22 DIAGNOSIS — E78.5 HYPERLIPIDEMIA, UNSPECIFIED HYPERLIPIDEMIA TYPE: ICD-10-CM

## 2022-07-22 DIAGNOSIS — E03.9 ACQUIRED HYPOTHYROIDISM: ICD-10-CM

## 2022-07-22 LAB
A/G RATIO: 1.6 (ref 1.1–2.2)
ALBUMIN SERPL-MCNC: 4.1 G/DL (ref 3.4–5)
ALP BLD-CCNC: 75 U/L (ref 40–129)
ALT SERPL-CCNC: 12 U/L (ref 10–40)
ANION GAP SERPL CALCULATED.3IONS-SCNC: 13 MMOL/L (ref 3–16)
AST SERPL-CCNC: 15 U/L (ref 15–37)
BASOPHILS ABSOLUTE: 0.1 K/UL (ref 0–0.2)
BASOPHILS RELATIVE PERCENT: 0.8 %
BILIRUB SERPL-MCNC: 0.3 MG/DL (ref 0–1)
BUN BLDV-MCNC: 9 MG/DL (ref 7–20)
CALCIUM SERPL-MCNC: 9.6 MG/DL (ref 8.3–10.6)
CHLORIDE BLD-SCNC: 104 MMOL/L (ref 99–110)
CHOLESTEROL, TOTAL: 183 MG/DL (ref 0–199)
CO2: 23 MMOL/L (ref 21–32)
CREAT SERPL-MCNC: <0.5 MG/DL (ref 0.6–1.1)
EOSINOPHILS ABSOLUTE: 0.4 K/UL (ref 0–0.6)
EOSINOPHILS RELATIVE PERCENT: 5.5 %
GFR AFRICAN AMERICAN: >60
GFR NON-AFRICAN AMERICAN: >60
GLUCOSE BLD-MCNC: 108 MG/DL (ref 70–99)
HCT VFR BLD CALC: 39.7 % (ref 36–48)
HDLC SERPL-MCNC: 43 MG/DL (ref 40–60)
HEMOGLOBIN: 13.2 G/DL (ref 12–16)
LDL CHOLESTEROL CALCULATED: 116 MG/DL
LYMPHOCYTES ABSOLUTE: 2.6 K/UL (ref 1–5.1)
LYMPHOCYTES RELATIVE PERCENT: 34.5 %
MCH RBC QN AUTO: 29.3 PG (ref 26–34)
MCHC RBC AUTO-ENTMCNC: 33.3 G/DL (ref 31–36)
MCV RBC AUTO: 88 FL (ref 80–100)
MONOCYTES ABSOLUTE: 0.6 K/UL (ref 0–1.3)
MONOCYTES RELATIVE PERCENT: 8.1 %
NEUTROPHILS ABSOLUTE: 3.9 K/UL (ref 1.7–7.7)
NEUTROPHILS RELATIVE PERCENT: 51.1 %
PDW BLD-RTO: 13.8 % (ref 12.4–15.4)
PLATELET # BLD: 275 K/UL (ref 135–450)
PMV BLD AUTO: 8.7 FL (ref 5–10.5)
POTASSIUM SERPL-SCNC: 4.5 MMOL/L (ref 3.5–5.1)
RBC # BLD: 4.51 M/UL (ref 4–5.2)
SODIUM BLD-SCNC: 140 MMOL/L (ref 136–145)
TOTAL PROTEIN: 6.7 G/DL (ref 6.4–8.2)
TRIGL SERPL-MCNC: 120 MG/DL (ref 0–150)
TSH SERPL DL<=0.05 MIU/L-ACNC: 1.95 UIU/ML (ref 0.27–4.2)
VLDLC SERPL CALC-MCNC: 24 MG/DL
WBC # BLD: 7.6 K/UL (ref 4–11)

## 2022-07-23 LAB
ESTIMATED AVERAGE GLUCOSE: 114 MG/DL
HBA1C MFR BLD: 5.6 %

## 2022-07-25 ENCOUNTER — OFFICE VISIT (OUTPATIENT)
Dept: FAMILY MEDICINE CLINIC | Age: 52
End: 2022-07-25
Payer: COMMERCIAL

## 2022-07-25 VITALS
HEART RATE: 81 BPM | RESPIRATION RATE: 16 BRPM | HEIGHT: 64 IN | BODY MASS INDEX: 43.6 KG/M2 | OXYGEN SATURATION: 98 % | WEIGHT: 255.4 LBS | SYSTOLIC BLOOD PRESSURE: 124 MMHG | DIASTOLIC BLOOD PRESSURE: 73 MMHG | TEMPERATURE: 97.8 F

## 2022-07-25 DIAGNOSIS — E66.01 MORBID OBESITY WITH BMI OF 40.0-44.9, ADULT (HCC): ICD-10-CM

## 2022-07-25 DIAGNOSIS — M54.16 LUMBAR RADICULOPATHY: ICD-10-CM

## 2022-07-25 DIAGNOSIS — E03.9 ACQUIRED HYPOTHYROIDISM: ICD-10-CM

## 2022-07-25 DIAGNOSIS — Z12.11 COLON CANCER SCREENING: ICD-10-CM

## 2022-07-25 DIAGNOSIS — E28.2 PCOS (POLYCYSTIC OVARIAN SYNDROME): ICD-10-CM

## 2022-07-25 DIAGNOSIS — Z00.00 ANNUAL PHYSICAL EXAM: Primary | ICD-10-CM

## 2022-07-25 PROCEDURE — 90750 HZV VACC RECOMBINANT IM: CPT | Performed by: FAMILY MEDICINE

## 2022-07-25 PROCEDURE — 99396 PREV VISIT EST AGE 40-64: CPT | Performed by: FAMILY MEDICINE

## 2022-07-25 PROCEDURE — 90471 IMMUNIZATION ADMIN: CPT | Performed by: FAMILY MEDICINE

## 2022-07-25 RX ORDER — METHYLPREDNISOLONE 4 MG/1
TABLET ORAL
Qty: 21 TABLET | Refills: 0 | Status: SHIPPED | OUTPATIENT
Start: 2022-07-25 | End: 2022-07-31

## 2022-07-25 ASSESSMENT — PATIENT HEALTH QUESTIONNAIRE - PHQ9
1. LITTLE INTEREST OR PLEASURE IN DOING THINGS: 0
SUM OF ALL RESPONSES TO PHQ QUESTIONS 1-9: 0
SUM OF ALL RESPONSES TO PHQ QUESTIONS 1-9: 0
SUM OF ALL RESPONSES TO PHQ9 QUESTIONS 1 & 2: 0
SUM OF ALL RESPONSES TO PHQ QUESTIONS 1-9: 0
SUM OF ALL RESPONSES TO PHQ QUESTIONS 1-9: 0
2. FEELING DOWN, DEPRESSED OR HOPELESS: 0

## 2022-07-25 NOTE — PROGRESS NOTES
Chief Complaint:   Kunal Ramos is a 50 y.o. female who presents for complete physical examination. History of Present Illness:    Was on metformin for PCOS- stopped it about 2m ago and has been doing well since. c/o ongoing left lower back pain. Began years ago. Last MRI 4 years ago. C/o worsening pain, radiating down left leg. This is getting worse. No weakness, numbness, tingling. Past Medical History:   Diagnosis Date    Allergy-induced asthma 2014    AR (allergic rhinitis)     Eczema     Morbid obesity with BMI of 40.0-44.9, adult ([address]     PCO (polycystic ovaries)     Unspecified hypothyroidism        Past Surgical History:   Procedure Laterality Date     SECTION      x3    CHOLECYSTECTOMY      SINUS SURGERY         Outpatient Medications Marked as Taking for the 22 encounter (Office Visit) with Jey Wilkerson MD   Medication Sig Dispense Refill    methylPREDNISolone (MEDROL DOSEPACK) 4 MG tablet Take by mouth. 21 tablet 0    levothyroxine (SYNTHROID) 50 MCG tablet Take one daily 90 tablet 3    budesonide-formoterol (SYMBICORT) 160-4.5 MCG/ACT AERO INHALE 2 PUFFS BY MOUTH TWICE DAILY 10.2 g 3    fluorouracil (EFUDEX) 5 % cream Apply topically 2 times daily x 2-3 weeks. Avoid the eyes. (Patient taking differently: Apply topically 2 times daily x 2-3 weeks. Avoid the eyes. ) 40 g 0    albuterol sulfate HFA (VENTOLIN HFA) 108 (90 Base) MCG/ACT inhaler Inhale 2 puffs into the lungs every 6 hours as needed for Wheezing 1 Inhaler 3    Vitamin D (CHOLECALCIFEROL) 1000 UNITS CAPS capsule Take 1,000 Units by mouth daily. cetirizine (ZYRTEC) 10 MG tablet Take 10 mg by mouth daily. therapeutic multivitamin-minerals (THERAGRAN-M) tablet Take 1 tablet by mouth daily.        Allergies   Allergen Reactions    Bactrim     Vicodin [Hydrocodone-Acetaminophen]        Social History     Socioeconomic History    Marital status:      Spouse name: Sera Cates    Number of children: 4    Years of education: None    Highest education level: None   Tobacco Use    Smoking status: Never    Smokeless tobacco: Never   Vaping Use    Vaping Use: Never used   Substance and Sexual Activity    Alcohol use: Yes     Comment: occ    Drug use: No    Sexual activity: Yes     Partners: Male     Comment:      Social Determinants of Health     Financial Resource Strain: Low Risk     Difficulty of Paying Living Expenses: Not hard at all   Food Insecurity: No Food Insecurity    Worried About Running Out of Food in the Last Year: Never true    [address] in the Last Year: Never true   Transportation Needs: No Transportation Needs    Lack of Transportation (Medical): No    Lack of Transportation (Non-Medical):  No   Physical Activity: Insufficiently Active    Days of Exercise per Week: 2 days    Minutes of Exercise per Session: 50 min   Stress: No Stress Concern Present    Feeling of Stress : Not at all   Social Connections: Socially Integrated    Frequency of Communication with Friends and Family: More than three times a week    Frequency of Social Gatherings with Friends and Family: More than three times a week    Attends Holiness Services: More than 4 times per year    Active Member of [address] [place] [address] TakeLessons or Organizations: Yes    Attends Club or Organization Meetings: More than 4 times per year    Marital Status:    Housing Stability: Low Risk     Unable to Pay for Housing in the Last Year: No    Number of Places Lived in the Last Year: 1    Unstable Housing in the Last Year: No       Family History   Problem Relation Age of Onset    Breast Cancer Maternal Grandmother          Health Maintenance   Topic Date Due    Cervical cancer screen  Never done    Colorectal Cancer Screen  Never done    Depression Screen  01/04/2022    Shingles vaccine (1 of 2) Never done    COVID-19 Vaccine (4 - Booster for Pfizer series) 05/21/2022    Pneumococcal 0-64 years Vaccine (1 - PCV) 06/14/2030 (Originally 4/14/1978) Flu vaccine (1) 09/01/2022    Breast cancer screen  08/09/2023    DTaP/Tdap/Td vaccine (2 - Td or Tdap) 02/25/2024    Diabetes screen  07/22/2025    Lipids  07/22/2027    Hepatitis A vaccine  Aged Out    Hepatitis B vaccine  Aged Out    Hib vaccine  Aged Out    Meningococcal (ACWY) vaccine  Aged Out    Hepatitis C screen  Discontinued    HIV screen  Discontinued         Review Of Systems:  Skin: no changing moles, abnormal pigmentation, rash, scaling, itching, masses, hair or nail changes  Eyes: no blurring, diplopia, or eye pain  Ears/Nose/Throat: no hearing loss, tinnitus, vertigo, nosebleed, nasal congestion, rhinorrhea, sore throat  Respiratory: no cough, pleuritic chest pain, dyspnea, or wheezing  Cardiovascular: no angina, RUDOLPH, orthopnea, PND, palpitations, or claudication  Gastrointestinal: no nausea, vomiting, heartburn, diarrhea, constipation, bloating, or abdominal pain  Genitourinary: no urinary urgency, frequency, dysuria, nocturia, hesitancy, or incontinence  Musculoskeletal: no arthritis, arthralgia, myalgia, weakness, or morning stiffness  Neurologic: no paralysis, paresis, paresthesia, seizures, tremors, or headaches  Hematologic/Lymphatic/Immunologic: no anemia, abnormal bleeding/bruising, fever, chills, night sweats, swollen glands, or unexplained weight loss  Endocrine: no heat or cold intolerance and no polyphagia, polydipsia, or polyuria    PHYSICAL EXAMINATION:  /73 (Site: Left Upper Arm, Position: Sitting, Cuff Size: Medium Adult)   Pulse 81   Temp 97.8 °F (36.6 °C) (Temporal)   Resp 16   Ht 5' 4\" (1.626 m)   Wt 255 lb 6.4 oz (115.8 kg)   SpO2 98%   BMI 43.84 kg/m²   General appearance: healthy, alert, no distress  Skin: Skin color, texture, turgor normal. No rashes or suspicious lesions. No induration or tightening palpated. Head: Normocephalic. No masses, lesions, tenderness or abnormalities  Eyes: Conjunctivae/corneas clear. PERRL, EOM's intact.   Ears: External ears normal. Canals clear. TM's clear bilaterally. Hearing grossly normal.  Nose/Sinuses: Nares normal.   Oropharynx: Lips, mucosa, and tongue normal. Teeth and gums normal. Oropharynx clear with no exudate seen. Neck: Neck supple, and symmetric. No adenopathy. Thyroid symmetric, normal size, without nodule. Trachea is midline. Back: Back symmetric, no curvature. ROM normal. No CVA tenderness. Lungs: Good diaphragmatic excursion. Lungs clear to auscultation bilaterally. No retractions or use of accessory muscles. Heart: PMI is not displaced, and no thrill noted. Regular rate and rhythm, with no rub, murmur or gallop noted. Breasts: Exam deferred to OB/GYN  Abdomen: Abdomen soft, non-tender. BS normal. No masses, organomegaly. No hernia noted. Extremities: Extremities normal. No deformities, edema, or skin discoloration. No cyanosis or clubbing noted to the nails. Hands and feet were warm and well-perfused with palpable dorsalis pedis pulses bilaterally. Lymph: No lymphadenopathy of the neck or supraclavicular regions. Musculoskeletal: Spine ROM normal. Muscular strength intact. Neuro: Cranial nerves intact, gait normal. No focal weakness. Pelvic: Exam deferred to OB/GYN        ASSESSMENT/PLAN:  1. Annual physical exam    2. Lumbar radiculopathy  Worsening symptoms  Last MRI from 2018 reviewed, will repeat and refer to spine if needed  - MRI LUMBAR SPINE [address]; Future  - methylPREDNISolone (MEDROL DOSEPACK) 4 MG tablet; Take by mouth. Dispense: 21 tablet; Refill: 0    3. Acquired hypothyroidism  Stable. Continue current dose    4. PCOS (polycystic ovarian syndrome)  Stable. Off metformin    5. Morbid obesity with BMI of 40.0-44.9, adult ([facility] [address]    6. Colon cancer screening  - Amb External Referral To Gastroenterology        All health maintenance issues were updated.   Recommend begin progressive daily aerobic exercise program and follow a low fat, low cholesterol diet Strong peripheral pulses

## 2022-07-26 DIAGNOSIS — E03.9 HYPOTHYROIDISM, UNSPECIFIED TYPE: ICD-10-CM

## 2022-07-27 RX ORDER — LEVOTHYROXINE SODIUM 0.05 MG/1
TABLET ORAL
Qty: 90 TABLET | Refills: 3 | Status: SHIPPED | OUTPATIENT
Start: 2022-07-27

## 2022-08-01 ENCOUNTER — OFFICE VISIT (OUTPATIENT)
Dept: DERMATOLOGY | Age: 52
End: 2022-08-01
Payer: COMMERCIAL

## 2022-08-01 ENCOUNTER — TELEPHONE (OUTPATIENT)
Dept: FAMILY MEDICINE CLINIC | Age: 52
End: 2022-08-01

## 2022-08-01 DIAGNOSIS — M54.16 LUMBAR RADICULOPATHY: Primary | ICD-10-CM

## 2022-08-01 DIAGNOSIS — L57.0 AK (ACTINIC KERATOSIS): Primary | ICD-10-CM

## 2022-08-01 DIAGNOSIS — L82.0 INFLAMED SEBORRHEIC KERATOSIS: ICD-10-CM

## 2022-08-01 PROCEDURE — 99212 OFFICE O/P EST SF 10 MIN: CPT | Performed by: DERMATOLOGY

## 2022-08-01 PROCEDURE — 17000 DESTRUCT PREMALG LESION: CPT | Performed by: DERMATOLOGY

## 2022-08-01 NOTE — PATIENT INSTRUCTIONS
Cryosurgery (Freezing) Wound Care Instructions    AFTER THE PROCEDURE:   You will notice swelling and redness around the site. This is normal.   You may experience a sharp or sore feeling for the next several days. For this discomfort, you may take acetaminophen (Tylenol©). A blister may develop at the treated area, sometimes as soon as by the end of the day. After several days, the blister will subside and a scab will form. If the area is bumped or traumatized during the first few days following freezing, you may develop bleeding into the blister, forming a blood blister. This is nothing to be alarmed about. If the blister is tense, uncomfortable, or much larger than the site that was frozen, you may pop the blister along its edge with a sterile needle (boiled, heated under a flame, or cleaned with alcohol) to allow the fluid to drain out. If the blister does not bother you, no treatment is needed. Do NOT peel off the top of the blister roof. It will act as a dressing on top of your wound. WOUND CARE:   You may shower or bathe as usual, but avoid scrubbing the areas that have been frozen. Cleanse the site twice a day with mild soapy water, and then apply a thin film of white petrolatum (Vaseline©). You do not need to cover the area, but can if you prefer. Do NOT allow the site to become dry or crusted, or attempt to dry it out with rubbing alcohol or hydrogen peroxide. Continue this regimen until the area is pink and healed. Depending on the size and location of your cryosurgery site, healing may take 2 to 4 weeks. The area may continue to be pink for several weeks, and over the next few months may become darker or lighter than the surrounding skin. This may be a permanent change.

## 2022-08-01 NOTE — PROGRESS NOTES
[facility]  Paulina Whitaker MD  [address]  1970    50 y.o. female     Date of Visit: 2022    Chief Complaint: f/u AK, lesion  Chief Complaint   Patient presents with    Skin Lesion     LN2- tip of nose, AK     Last seen: ~  *her kids went to [place], graduated from [employer]    History of Present Illness:    Here for f/u for lesion on the nose that was noted at last visit and concerning for AK. Was going to rtn for LN2 but feels that it may be resolved. No sx and no recent rough scale. 2.  Also with a lesion on the L clavicle recently - lesion grew and then fell off. No family hx of psoriasis but she has twin girls with eczema. They graduated [employer] . No personal or family hx of skin cancer. Review of Systems:  Gen: Feels well, good sense of health. Past Medical History, Family History, Surgical History, Medications and Allergies reviewed. Past Medical History:   Diagnosis Date    Allergy-induced asthma 2014    AR (allergic rhinitis)     Eczema     Morbid obesity with BMI of 40.0-44.9, adult ([facility] [address]     PCO (polycystic ovaries)     Unspecified hypothyroidism        Past Surgical History:   Procedure Laterality Date     SECTION      x3    CHOLECYSTECTOMY      SINUS SURGERY         Outpatient Medications Marked as Taking for the 22 encounter (Office Visit) with Jose J Marin MD   Medication Sig Dispense Refill    levothyroxine (SYNTHROID) 50 MCG tablet Take one daily 90 tablet 3    budesonide-formoterol (SYMBICORT) 160-4.5 MCG/ACT AERO INHALE 2 PUFFS BY MOUTH TWICE DAILY 10.2 g 3    albuterol sulfate HFA (VENTOLIN HFA) 108 (90 Base) MCG/ACT inhaler Inhale 2 puffs into the lungs every 6 hours as needed for Wheezing 1 Inhaler 3    Vitamin D (CHOLECALCIFEROL) 1000 UNITS CAPS capsule Take 1,000 Units by mouth daily. cetirizine (ZYRTEC) 10 MG tablet Take 10 mg by mouth daily.       therapeutic

## 2022-08-01 NOTE — TELEPHONE ENCOUNTER
Fausto from [facility] called to inform the patient's provider that their LUMBAR MRI was denied. They stated that it was due to lack of recent physical therapy and they want her to go to a free-standing facility. Case # [id number]    Phone # [phone] inform the patient through [address] or call as they could not reach the patient when they attempted.

## 2022-08-02 NOTE — TELEPHONE ENCOUNTER
Patient is wanting to know what does she need to be now till the appeal?  She states that she is a bunch of pain and would like to get some relief.   Patient asking if she needs another round of medication        Please advise       Provider out of the office

## 2022-08-04 RX ORDER — METHYLPREDNISOLONE 4 MG/1
TABLET ORAL
Qty: 21 TABLET | Refills: 0 | Status: SHIPPED | OUTPATIENT
Start: 2022-08-04 | End: 2022-08-10

## 2022-08-22 ENCOUNTER — OFFICE VISIT (OUTPATIENT)
Dept: ORTHOPEDIC SURGERY | Age: 52
End: 2022-08-22
Payer: COMMERCIAL

## 2022-08-22 VITALS — WEIGHT: 255.29 LBS | BODY MASS INDEX: 43.58 KG/M2 | HEIGHT: 64 IN

## 2022-08-22 DIAGNOSIS — M47.816 LUMBAR SPONDYLOSIS: ICD-10-CM

## 2022-08-22 DIAGNOSIS — M54.10 RADICULAR PAIN OF LEFT LOWER EXTREMITY: ICD-10-CM

## 2022-08-22 DIAGNOSIS — M51.36 DDD (DEGENERATIVE DISC DISEASE), LUMBAR: ICD-10-CM

## 2022-08-22 DIAGNOSIS — M54.42 ACUTE MIDLINE LOW BACK PAIN WITH LEFT-SIDED SCIATICA: ICD-10-CM

## 2022-08-22 DIAGNOSIS — M43.10 DEGENERATIVE SPONDYLOLISTHESIS: ICD-10-CM

## 2022-08-22 DIAGNOSIS — M51.26 DISCOGENIC SYNDROME, LUMBAR: ICD-10-CM

## 2022-08-22 PROCEDURE — 99204 OFFICE O/P NEW MOD 45 MIN: CPT | Performed by: INTERNAL MEDICINE

## 2022-08-22 RX ORDER — TIZANIDINE 4 MG/1
4 TABLET ORAL 3 TIMES DAILY PRN
Qty: 30 TABLET | Refills: 1 | Status: SHIPPED | OUTPATIENT
Start: 2022-08-22

## 2022-08-22 RX ORDER — TRAMADOL HYDROCHLORIDE 50 MG/1
50 TABLET ORAL EVERY 6 HOURS PRN
Qty: 15 TABLET | Refills: 0 | Status: SHIPPED | OUTPATIENT
Start: 2022-08-22 | End: 2022-08-29

## 2022-08-22 RX ORDER — GABAPENTIN 300 MG/1
300 CAPSULE ORAL NIGHTLY
Qty: 30 CAPSULE | Refills: 0 | Status: SHIPPED | OUTPATIENT
Start: 2022-08-22 | End: 2022-10-03 | Stop reason: SDUPTHER

## 2022-08-22 SDOH — HEALTH STABILITY: PHYSICAL HEALTH: ON AVERAGE, HOW MANY MINUTES DO YOU ENGAGE IN EXERCISE AT THIS LEVEL?: 0 MIN

## 2022-08-22 NOTE — PROGRESS NOTES
Chief Complaint:   Chief Complaint   Patient presents with    Lower Back Pain     Lumbar pain started in July. Pain has been gradually getting worse and started radiating down her L low back. Now radiating down her L leg and into her L foot. With numbness/tingling/and stabbing pain          History of Present Illness:       Patient is a 50 y.o. female presents with the above complaint. The symptoms began worsening 6 weeksago and started without an injury. The patient describes a sharp, aching, numbness, pins and needles pain that does radiate. The symptoms are constant  and are are worsening since the onset. The symptoms of back pain doshow a discogenic provacative pattern and are constant and worsened by  all  and improved with  lying and shifting position . There is not new onset weakness or progressive weakness of the lower extremities that has developed. The patient denies new onset bowel or bladder dysfunction. There  is no history of previous spinal trauma. The patient does not have history or orthopaedic lumbar spine surgery. Pain localizes to the lumbar region    Painl levels: 7    There is lower limb pain . The back pain : limb pain is 50 : 50 and follows a L5-S1 dermatomal distribution involving Left lower extremity. Work-up to date has included: MRI in the remote past referenced below  Prior treatment has included oral steroid-Medrol x2 with only temporary partial benefit. The patient has no history or autoimmune disease, inflammatory arthropathy or crystal arthropathy.     .       Past Medical History:        Past Medical History:   Diagnosis Date    Allergy-induced asthma 2014    AR (allergic rhinitis)     Eczema     Morbid obesity with BMI of 40.0-44.9, adult ([facility] [address]     PCO (polycystic ovaries)     Unspecified hypothyroidism          Past Surgical History:   Procedure Laterality Date     SECTION      x3    CHOLECYSTECTOMY      SINUS SURGERY           Present Medications:         Current Outpatient Medications   Medication Sig Dispense Refill    diclofenac (VOLTAREN) 50 MG EC tablet Take 1 tablet by mouth 2 times daily 60 tablet 1    tiZANidine (ZANAFLEX) 4 MG tablet Take 1 tablet by mouth 3 times daily as needed (Muscle tightness/spasm) 30 tablet 1    traMADol (ULTRAM) 50 MG tablet Take 1 tablet by mouth every 6 hours as needed for Pain for up to 7 days. 15 tablet 0    gabapentin (NEURONTIN) 300 MG capsule Take 1 capsule by mouth nightly for 30 days. Intended supply: 30 days 30 capsule 0    levothyroxine (SYNTHROID) 50 MCG tablet Take one daily 90 tablet 3    budesonide-formoterol (SYMBICORT) 160-4.5 MCG/ACT AERO INHALE 2 PUFFS BY MOUTH TWICE DAILY 10.2 g 3    fluorouracil (EFUDEX) 5 % cream Apply topically 2 times daily x 2-3 weeks. Avoid the eyes. (Patient taking differently: Apply topically 2 times daily x 2-3 weeks. Avoid the eyes. ) 40 g 0    albuterol sulfate HFA (VENTOLIN HFA) 108 (90 Base) MCG/ACT inhaler Inhale 2 puffs into the lungs every 6 hours as needed for Wheezing 1 Inhaler 3    Vitamin D (CHOLECALCIFEROL) 1000 UNITS CAPS capsule Take 1,000 Units by mouth daily. cetirizine (ZYRTEC) 10 MG tablet Take 10 mg by mouth daily. therapeutic multivitamin-minerals (THERAGRAN-M) tablet Take 1 tablet by mouth daily. No current facility-administered medications for this visit. Allergies:         Allergies   Allergen Reactions    Bactrim     Vicodin [Hydrocodone-Acetaminophen]         Social History:         Social History     Socioeconomic History    Marital status:      Spouse name: Cuhcky Barroso    Number of children: 4    Years of education: Not on file    Highest education level: Not on file   Occupational History    Not on file   Tobacco Use    Smoking status: Never    Smokeless tobacco: Never   Vaping Use    Vaping Use: Never used   Substance and Sexual Activity    Alcohol use: Yes     Comment: occ    Drug use: No    Sexual activity: Yes Partners: Male     Comment:    Other Topics Concern    Not on file   Social History Narrative    Not on file     Social Determinants of Health     Financial Resource Strain: Low Risk     Difficulty of Paying Living Expenses: Not hard at all   Food Insecurity: No Food Insecurity    Worried About Running Out of Food in the Last Year: Never true    [address] in the Last Year: Never true   Transportation Needs: No Transportation Needs    Lack of Transportation (Medical): No    Lack of Transportation (Non-Medical): No   Physical Activity: Inactive    Days of Exercise per Week: 2 days    Minutes of Exercise per Session: 0 min   Stress: No Stress Concern Present    Feeling of Stress : Not at all   Social Connections: Socially Integrated    Frequency of Communication with Friends and Family: More than three times a week    Frequency of Social Gatherings with Friends and Family: More than three times a week    Attends Jainism Services: More than 4 times per year    Active Member of [address] [place] [address] or Organizations: Yes    Attends Club or Organization Meetings: More than 4 times per year    Marital Status:    Intimate Partner Violence: Not At Risk    Fear of Current or Ex-Partner: No    Emotionally Abused: No    Physically Abused: No    Sexually Abused: No   Housing Stability: Low Risk     Unable to Pay for Housing in the Last Year: No    Number of Places Lived in the Last Year: 1    Unstable Housing in the Last Year: No        Review of Symptoms:    Pertinent items are noted in HPI    Review of systems reviewed from Patient History Form dated on today's date and   available in the patient's chart under the Media tab. Vital Signs: There were no vitals filed for this visit. General Exam:     Constitutional: Patient is adequately groomed with no evidence of malnutrition  Mental Status: The patient is oriented to time, place and person. The patient's mood and affect are appropriate.   Vascular: Examination reveals no swelling or calf tenderness. Peripheral pulses are palpable and 2+. Lymphatics: no lymphadenopathy of the inguinal region or lower extremity      Physical Exam: lower back   General: Obese body habitus in no acute distress   Primary Exam:    Inspection: No deformity atrophy appreciable curvature      Palpation: No focal trigger point tenderness      Range of Motion: 90/15      Strength: Normal lower extremity      Special Tests: Negative SLR      Skin: There are no rashes, ulcerations or lesions. Gait: Nonantalgic      Reflex intact lower     Additional Comments:        Additional Examinations:           Neurolgic -Light touch sensation and manual muscle testing normal L2-S1. No fasiculations. Pattella tendon and Achilles tendon reflexes +2 bilaterally. Seated SLR negative           Office Imaging Results/Procedures PerformedToday:      Radiology:      X-rays obtained and reviewed in office:   Views 3 views lumbar spine   Location lumbar spine   Impression normal alignment on the AP projection lateral projection demonstrates anterospondylolisthesis L4-L5 facet arthropathy at L4-L5 greater than L3/L4 and mild to moderate at L5/S1. There is mild intervertebral to space narrowing at L4/L5       Office Procedures:     Orders Placed This Encounter   Procedures    XR LUMBAR SPINE (2-3 VIEWS)     Standing Status:   Future     Number of Occurrences:   1     Standing Expiration Date:   8/22/2023     Order Specific Question:   Reason for exam:     Answer:   pain    MRI LUMBAR SPINE WO CONTRAST     Standing Status:   Future     Standing Expiration Date:   8/22/2023     Scheduling Instructions:      Ed Strickland, please contact pt to schedule, [address] will obtain auth and fwd to your facility. Pt advised to f/u in clinic 2-3 days after MRI for results.      Order Specific Question:   Reason for exam:     Answer:   R/O HNP / STENOSIS - L RADIC     Order Specific Question:   What is the sedation requirement? Answer:   None           Other Outside Imaging and Testing Personally Reviewed:    XR LUMBAR SPINE (2-3 VIEWS)    Result Date: 8/22/2022  Radiology exam is complete. No Radiologist dictation. Please follow up with ordering provider. COMPARISON:   03/01/2016. HISTORY:   ORDERING PHYSICIAN PROVIDED HISTORY: Lumbar radiculopathy   TECHNOLOGIST PROVIDED HISTORY:   Technologist Provided Reason for Exam: PT STS NO KNOWN INJURY TO LUMBAR SPINE   Acuity: Acute   Type of Encounter: Initial   Additional signs and symptoms: PT STS NO KNOWN INJURY TO LUMBAR SPINE   Relevant Medical/Surgical History: PT STS NO KNOWN INJURY TO LUMBAR SPINE       Numbness in the left leg with associated pain. Symptoms for 10 days. Initial evaluation. FINDINGS:   BONES/ALIGNMENT: There is grade 1 anterolisthesis of L4 on L5, slightly   increased. Alignment of the lumbar spine is otherwise normal.       There are hemangiomas noted in the T12 and L3 vertebral bodies. There are no   areas of marrow edema to suggest an acute fracture. SPINAL CORD: The conus medullaris demonstrates normal signal intensity and   terminates near the level of the L1 vertebral body. The cauda equina nerve   roots are unremarkable. SOFT TISSUES: There are several small follicles noted in the right ovaries,   several of which have septation, the largest of which measures 1.8 cm. These   are likely physiologic in a premenopausal female. There are no other paraspinal masses identified. T11-T12: There is a minimal disc bulge indenting the anterior thecal sac. No   central spinal canal or foraminal narrowing. T12-L1: There is a left paracentral disc protrusion measuring 2 mm in   diameter, unchanged. No central spinal canal or foraminal narrowing. L1-L2: There is no significant disc herniation, spinal canal stenosis or   neural foraminal narrowing.        L2-L3: There is no significant disc herniation, spinal canal stenosis or   neural foraminal narrowing. L3-L4: There is no significant disc herniation, spinal canal stenosis or   neural foraminal narrowing. L4-L5: There is bilateral facet arthropathy with fluid in the facet joints,   slightly increased in severity. There is no disc herniation, foraminal   narrowing, or central spinal canal stenosis. L5-S1: There is no significant disc herniation, spinal canal stenosis or   neural foraminal narrowing. Impression   1. Slight increase in the degree of facet arthropathy at L4-L5 since the   previous study. 2. There are no new disc herniations, areas of foraminal narrowing, nerve   root compression, or central spinal canal stenosis. Assessment   Impression: . Encounter Diagnoses   Name Primary? Discogenic syndrome, lumbar     Degenerative spondylolisthesis     Radicular pain of left lower extremity     Lumbar spondylosis     Acute midline low back pain with left-sided sciatica     DDD (degenerative disc disease), lumbar               Plan:     MRI lumbar spine evaluate severity of discopathy/ stenosis suspected clinically  Consider Her a candidate for spine intervention injection  Activity modification, lumbar spine precautions  lumbar spinestabilization home exercise program  Medical pain management: NSAID: Voltaren, analgesic: Ultram as needed minimize use of narcotics, muscle relaxant: Zanaflex, gabapentin low-dose    Approximately 45 minutes was spent related to previewing pertinent medical documentation prior to the patient's visit along with counseling during the patient's visit with respect to treatment options inclusive of alternatives to treatment and the complications and risks related to those treatment options along with expectations of outcome related to those treatments and inclusive of time in the documentation and ordering of testing and treatment after the visit.      The spondylolisthesis at L4-L5 represents a new finding from the MRI dated 2018. The nature of the finding, probable diagnosis and likely treatment was thoroughly discussed with the patient. The options, risks, complications, alternative treatment as well as some of the differential diagnosis was discussed. The patient was thoroughly informed and all questions were answered. the patient indicated understanding and satisfaction with the discussion. Orders:        Orders Placed This Encounter   Procedures    XR LUMBAR SPINE (2-3 VIEWS)     Standing Status:   Future     Number of Occurrences:   1     Standing Expiration Date:   8/22/2023     Order Specific Question:   Reason for exam:     Answer:   pain    MRI LUMBAR SPINE WO CONTRAST     Standing Status:   Future     Standing Expiration Date:   8/22/2023     Scheduling Instructions:      Ulises Butt, please contact pt to schedule, [address] will obtain auth and fwd to your facility. Pt advised to f/u in clinic 2-3 days after MRI for results. Order Specific Question:   Reason for exam:     Answer:   R/O HNP / STENOSIS - L RADIC     Order Specific Question:   What is the sedation requirement? Answer:   None           Disclaimer: \"This note was dictated with voice recognition software. Though review and correction are routine, we apologize for any errors. \"

## 2022-09-06 ENCOUNTER — OFFICE VISIT (OUTPATIENT)
Dept: ORTHOPEDIC SURGERY | Age: 52
End: 2022-09-06
Payer: COMMERCIAL

## 2022-09-06 DIAGNOSIS — M48.061 DEGENERATIVE LUMBAR SPINAL STENOSIS: ICD-10-CM

## 2022-09-06 DIAGNOSIS — M43.10 DEGENERATIVE SPONDYLOLISTHESIS: ICD-10-CM

## 2022-09-06 DIAGNOSIS — M79.2 NEUROGENIC PAIN OF LOWER EXTREMITY, LEFT: ICD-10-CM

## 2022-09-06 DIAGNOSIS — M47.816 LUMBAR SPONDYLOSIS: ICD-10-CM

## 2022-09-06 DIAGNOSIS — M51.36 DDD (DEGENERATIVE DISC DISEASE), LUMBAR: ICD-10-CM

## 2022-09-06 PROCEDURE — 99214 OFFICE O/P EST MOD 30 MIN: CPT | Performed by: INTERNAL MEDICINE

## 2022-09-06 NOTE — PROGRESS NOTES
Chief Complaint:   Chief Complaint   Patient presents with    Lower Back Pain            History of Present Illness:       Patient is a 50 y.o. female returns follow up for the above complaint. The patient was last seen approximately 2 weeksago. The symptoms show no change since the last visit. The patient has had further testing for the problem. MRI completed in the interim    Back:Left leg pain 50:50. Pain in back and leg is aching, burning, or numbness and pins-and-needles quality. Symptoms seem to follow with neurogenic claudication pattern. Left lower limb pain extensor aspect of the lower limb and specifically no involvement of the flexor aspect of the left lower limb. The patient continues with I HEP    The patient denies new onset or progressive weakness of the lower extremities. The patient denies new onset bowel or bladder dysfunction. She continues on medical pain management as per previous  inclusive of NSAID- , muscle relaxant- , gabapentin and Ultram only sparingly     Past Medical History:        Past Medical History:   Diagnosis Date    Allergy-induced asthma 2/25/2014    AR (allergic rhinitis)     Eczema     Morbid obesity with BMI of 40.0-44.9, adult ([facility])     PCO (polycystic ovaries)     Unspecified hypothyroidism         Present Medications:         Current Outpatient Medications   Medication Sig Dispense Refill    diclofenac (VOLTAREN) 50 MG EC tablet Take 1 tablet by mouth 2 times daily 60 tablet 1    tiZANidine (ZANAFLEX) 4 MG tablet Take 1 tablet by mouth 3 times daily as needed (Muscle tightness/spasm) 30 tablet 1    gabapentin (NEURONTIN) 300 MG capsule Take 1 capsule by mouth nightly for 30 days.  Intended supply: 30 days 30 capsule 0    levothyroxine (SYNTHROID) 50 MCG tablet Take one daily 90 tablet 3    budesonide-formoterol (SYMBICORT) 160-4.5 MCG/ACT AERO INHALE 2 PUFFS BY MOUTH TWICE DAILY 10.2 g 3    fluorouracil (EFUDEX) 5 % cream Apply topically 2 times daily x 2-3 weeks. Avoid the eyes. (Patient taking differently: Apply topically 2 times daily x 2-3 weeks. Avoid the eyes. ) 40 g 0    albuterol sulfate HFA (VENTOLIN HFA) 108 (90 Base) MCG/ACT inhaler Inhale 2 puffs into the lungs every 6 hours as needed for Wheezing 1 Inhaler 3    Vitamin D (CHOLECALCIFEROL) 1000 UNITS CAPS capsule Take 1,000 Units by mouth daily. cetirizine (ZYRTEC) 10 MG tablet Take 10 mg by mouth daily. therapeutic multivitamin-minerals (THERAGRAN-M) tablet Take 1 tablet by mouth daily. No current facility-administered medications for this visit. Allergies: Allergies   Allergen Reactions    Bactrim     Vicodin [Hydrocodone-Acetaminophen]            Review of Systems:    Pertinent items are noted in HPI     Vital Signs: There were no vitals filed for this visit. General Exam:     Constitutional: Patient is adequately groomed with no evidence of malnutrition    Physical Exam: lower back      Primary Exam:    Inspection: No deformity attributable curvature      Palpation: No focal trigger point tenderness      Range of Motion: 90/15 pain with extension      Strength: Normal lower extremity      Special Tests: SLR mildly positive for back pain left      Skin: There are no rashes, ulcerations or lesions. Gait: Nonantalgic      Neurovascular - non focal and intact       Additional Comments:        Additional Examinations:                Office Imaging Results/Procedures PerformedToday:             Office Procedures:   No orders of the defined types were placed in this encounter.           Other Outside Imaging and Testing Personally Reviewed:    MRI LUMBAR SPINE WO CONTRAST    Result Date: 8/31/2022  Site: [facility] #: [id number] #: [id number] Procedure: MR Lumbar Spine w/o Contrast ; Reason for Exam: Acute midline low back pain with left-sided radiculopathy, radicular pain of left lower extremity This document is confidential medical information. Unauthorized disclosure or use of this information is prohibited by law. If you are not the intended recipient of this document, please advise us by calling immediately [phone]. Analyze Re [address] Patient Name: Conchita Gallo Case ID: [id number] Patient : 1970 Referring Physician: Richard Gupta MD Exam Date: 2022 Exam Description: MR Lumbar Spine w/o Contrast HISTORY:  51-year-old female with midline back pain and left-sided radiculopathy. TECHNICAL FACTORS:  Long- and short-axis fat- and water-weighted images were performed. COMPARISON:  None. FINDINGS:  Spine Numbering: For purposes of this dictation, it is assumed that there are five non-rib-bearing, lumbar-type vertebrae, and the most caudal fully segmented lumbar vertebra is labeled L5. Alignment: Mild degenerative anterolisthesis L4 on L5. Vertebral Bodies: Normal in height. Marrow Signal: Hemangioma formation in the vertebral body of L3. Mild edematous endplate change in the inferior endplate of L4 anteriorly. Intervertebral Discs: Mild disc desiccation with disc unroofing L4-L5 secondary to anterolisthesis. Conus Medullaris:  Terminates at a normal level. Cauda Equina: Normal. Paraspinal Soft Tissues: Normal. Visualized portions of T10 through T12 are without disc herniation, spinal canal stenosis, or foraminal narrowing. Individual Levels: T12-L1: Noncompressive disc bulge with small central protrusion. No spinal canal stenosis. No  foraminal narrowing. Moderate facet arthropathy with bilateral capsulitis. L1-L2: Noncompressive disc bulge, no spinal canal stenosis, and no foraminal narrowing. Moderate facet arthropathy with bilateral capsulitis. L2-L3: No focal disc herniation, no spinal canal stenosis, and no foraminal narrowing. Moderate  facet arthropathy with bilateral capsulitis. L3-L4: Noncompresive disc bulge. No spinal canal stenosis, and no foraminal narrowing.  Moderate facet arthropathy with bilateral capsulitis. L4-L5: Mild degenerative anterolisthesis with disc unroofing. Mild spinal canal stenosis. Mild bilateral discogenic foraminal narrowing. Mild edematous endplate change in the anterior inferior endplate of L4. Severe facet arthropathy bilaterally with moderate, left greater than  right facet capsulitis and distended facet sign. L5-S1: Noncompressive disc bulge. No spinal canal stenosis. No foraminal narrowing. Severe facet arthropathy. Bilateral SI joint osteoarthritic changes. CONCLUSION: 1. Mild degenerative L4-L5 anterolisthesis with severe left greater right facet arthropathy with capsulitis. Mild spinal canal stenosis. Findings may correlate with patient's symptoms. No nerve impingement. 2. Bilateral SI joint osteoarthritis. Thank you for the opportunity to provide your interpretation. Sofia Almendarez MD A: PAULINE/SOLEDAD/KYRIE/shayne 08/31/2022 8:52 AM T: TV 08/30/2022 1:47 PM              Assessment   Impression: . Encounter Diagnoses   Name Primary? Degenerative spondylolisthesis     Degenerative lumbar spinal stenosis     DDD (degenerative disc disease), lumbar     Lumbar spondylosis               Plan:     Proceed with lumbar spine intervention injection-L DELONTE L4/L5 left translaminar  Activity modification lumbar spine precautions activity  Multimodal medical pain management as per previous inclusive of gabapentin minimize use of Ultram which she is doing  No indication for spine surgery consultation/intervention at this time    Lesser clinical suspicion that her pain is facet mediated.   Proceed as outlined above    Approximately 30 minutes was spent related to previewing pertinent medical documentation prior to the patient's visit along with counseling during the patient's visit with respect to treatment options inclusive of alternatives to treatment and the complications and risks related to those treatment options along with expectations of outcome related

## 2022-09-06 NOTE — LETTER
[address]  [address] [facility] [address]  Phone: [phone]  Fax: [phone]    Yari Navarro MD    September 6, 2022     Jie Zapata MD  [address] [place] [address]    Patient: Yahir Mehta   MR Number: [id number]   YOB: 1970   Date of Visit: 9/6/2022       Dear Jie Zapata:    Thank you for referring Yahir Mehta to me for evaluation/treatment. Below are the relevant portions of my assessment and plan of care. Impression: . Encounter Diagnoses   Name Primary?  Degenerative spondylolisthesis     Degenerative lumbar spinal stenosis     DDD (degenerative disc disease), lumbar     Lumbar spondylosis               Plan:     Proceed with lumbar spine intervention injection-L DELONTE L4/L5 left translaminar  Activity modification lumbar spine precautions activity  Multimodal medical pain management as per previous inclusive of gabapentin minimize use of Ultram which she is doing  No indication for spine surgery consultation/intervention at this time    Lesser clinical suspicion that her pain is facet mediated. Proceed as outlined above    Approximately 30 minutes was spent related to previewing pertinent medical documentation prior to the patient's visit along with counseling during the patient's visit with respect to treatment options inclusive of alternatives to treatment and the complications and risks related to those treatment options along with expectations of outcome related to those treatments and inclusive of time in the documentation and ordering of testing and treatment after the visit. Orders:      No orders of the defined types were placed in this encounter. Robin Oliver MD.      Disclaimer: \"This note was dictated with voice recognition software. Though review and correction are routine, we apologize for any errors. \"       If you have questions, please do not hesitate to call me. I look forward to following Martin Meredith along with you.     Sincerely,      Netta Richards MD

## 2022-09-20 ENCOUNTER — HOSPITAL ENCOUNTER (OUTPATIENT)
Dept: INTERVENTIONAL RADIOLOGY/VASCULAR | Age: 52
Discharge: HOME | End: 2022-09-20
Payer: COMMERCIAL

## 2022-09-20 DIAGNOSIS — M43.10 DEGENERATIVE SPONDYLOLISTHESIS: ICD-10-CM

## 2022-09-20 PROCEDURE — 62323 NJX INTERLAMINAR LMBR/SAC: CPT

## 2022-09-20 PROCEDURE — 2709999900 HC NON-CHARGEABLE SUPPLY

## 2022-09-20 NOTE — DISCHARGE INSTRUCTIONS
Lumbar Epidural Steroid Injection  Patient Discharge Instructions      When [address] [facility] should not drive the day of the procedure. You may experience leg weakness during the first 24 hours following the procedure. To prevent yourself from falling, it is important to have someone help you walk. However, you do not need to stay in bed when you get home. In fact, it is best to walk around if you feel up to it, but you will need assistance during the first 24 hours following the epidural steroid injection. Even if you feel better right away, avoid activities that may strain your back. Keep in mind that most patients feel increased pain for the first 24 hours. You should start feeling some pain relief 2-3 days following the injection. This is because the steroid will start working within three days of the injection with maximal effect by one week. At that time, we will evaluate your pain level to determine the need for another steroid injection. Remove bandaid(s) within 24 hours. When to Call Your Doctor    Call right away if you notice any of the following symptoms:    Severe pain or headache;  Fever or chills; Redness or swelling around the injection site. Loss of bladder or bowel control. You may contact [address] [place] [address] [facility]. for any questions or problems that may occur at [phone] during the hours of 9am-5pm Monday-Friday, or the hospital  after hours at [phone], to have the interventional radiologist on call paged. [facility]  Cardiovascular Special Procedures  General Discharge Instructions    PROCEDURE: ____________________________________________________    ____ Skipper Revel may be drowsy or lightheaded after receiving sedation.  DO NOT operate a vehicle (automobile, bicycle, motorcycle, machinery, or power tools), make any important decisions or sign any important/legal documents, or drink alcoholic beverages for the next 24 hours  ____ We strongly suggest that a responsible adult be with you for the next 24 hours for your protection and safety  ____ If the intravenous catheter site is painful, apply warm wet compresses on the site until the soreness is relieved and elevate the arm above the heart. Call your physician if no improvement in 2 to 3 days    DIETARY INSTRUCTIONS:    ____ Drink extra fluids over the next 24 hours (If not contraindicated by illness or by                   physician order)  ____ Start with clear liquids and progress to normal diet as you feel like eating. If you experience nausea or repeated episodes of vomiting, which persist beyond 12-24 hours, notify your doctor        __x__ Resume your previous diet    ACTIVITY INSTRUCTIONS:    ____ See other instructions  ____ No special instructions  ____ Rest for 24 hours    __x__ Up as tolerated  __x__ Increase activity as tolerated    Wound/Dressing Instructions:  ____ See other instructions  ____ May shower, tomorrow  __x__ Remove bandage within 24 hours    MEDICATION INSTRUCTIONS:    ____ See Medication Reconciliation Sheet      SPECIAL INSTRUCTIONS:  __________________________________________________________________________________________________________________________________________________________________________________________________________________________________________                                                                                                                                                                                                                                                                                                        The Discharge Instructions have been explained to me. I understand and can verbalize these instructions.

## 2022-10-03 ENCOUNTER — OFFICE VISIT (OUTPATIENT)
Dept: ORTHOPEDIC SURGERY | Age: 52
End: 2022-10-03
Payer: COMMERCIAL

## 2022-10-03 VITALS — WEIGHT: 255.29 LBS | BODY MASS INDEX: 43.58 KG/M2 | HEIGHT: 64 IN

## 2022-10-03 DIAGNOSIS — M79.2 NEUROGENIC PAIN OF LOWER EXTREMITY, LEFT: ICD-10-CM

## 2022-10-03 DIAGNOSIS — M43.10 DEGENERATIVE SPONDYLOLISTHESIS: Primary | ICD-10-CM

## 2022-10-03 DIAGNOSIS — M51.36 DDD (DEGENERATIVE DISC DISEASE), LUMBAR: ICD-10-CM

## 2022-10-03 DIAGNOSIS — S93.402A SPRAIN AND STRAIN OF LEFT ANKLE: ICD-10-CM

## 2022-10-03 DIAGNOSIS — M47.816 LUMBAR SPONDYLOSIS: ICD-10-CM

## 2022-10-03 DIAGNOSIS — S96.912A SPRAIN AND STRAIN OF LEFT ANKLE: ICD-10-CM

## 2022-10-03 DIAGNOSIS — M48.061 DEGENERATIVE LUMBAR SPINAL STENOSIS: ICD-10-CM

## 2022-10-03 PROCEDURE — 99214 OFFICE O/P EST MOD 30 MIN: CPT | Performed by: INTERNAL MEDICINE

## 2022-10-03 PROCEDURE — L1902 AFO ANKLE GAUNTLET PRE OTS: HCPCS | Performed by: INTERNAL MEDICINE

## 2022-10-03 RX ORDER — GABAPENTIN 300 MG/1
300 CAPSULE ORAL NIGHTLY
Qty: 30 CAPSULE | Refills: 1 | Status: SHIPPED | OUTPATIENT
Start: 2022-10-03 | End: 2022-11-02

## 2022-10-03 NOTE — PROGRESS NOTES
Chief Complaint:   Chief Complaint   Patient presents with    Lower Back Pain          History of Present Illness:       Patient is a 50 y.o. female returns follow up for the above complaint. The patient was last seen approximately 1 monthsago. The symptoms are improving since the last visit. The patient has had further testing for the problem. Improved the symptoms remain problematic. The patient did undergo lumbar epidural  in the interim. Patient reports 25-50% improvement related to this intervention. The epidural was most effective at eliminating her back pain, however the leg pain persists. Back:Left leg pain 0:100. Pain in leg is numbness and pins-and-needles in quality the lower limb pain follows an L5/S1 dermatomal distribution    Pain levels:4. The patient denies new onset or progressive weakness of the lower extremities. The patient denies bowel or bladder dysfunction. She continues on medical pain management as per previous  inclusive of NSAID-diclofenac, and gabapentin    Her other complaint relates to swelling about the left ankle and low-grade discomfort. She does not recall a specific injury and has no history of remote trauma about the foot or ankle of significance         Past Medical History:        Past Medical History:   Diagnosis Date    Allergy-induced asthma 2/25/2014    AR (allergic rhinitis)     Eczema     Morbid obesity with BMI of 40.0-44.9, adult (HCC)     PCO (polycystic ovaries)     Unspecified hypothyroidism         Present Medications:         Current Outpatient Medications   Medication Sig Dispense Refill    gabapentin (NEURONTIN) 300 MG capsule Take 1 capsule by mouth nightly for 30 days.  Intended supply: 30 days 30 capsule 1    diclofenac (VOLTAREN) 50 MG EC tablet Take 1 tablet by mouth 2 times daily 60 tablet 1    tiZANidine (ZANAFLEX) 4 MG tablet Take 1 tablet by mouth 3 times daily as needed (Muscle tightness/spasm) 30 tablet 1    levothyroxine (SYNTHROID) 50 MCG tablet Take one daily 90 tablet 3    budesonide-formoterol (SYMBICORT) 160-4.5 MCG/ACT AERO INHALE 2 PUFFS BY MOUTH TWICE DAILY 10.2 g 3    fluorouracil (EFUDEX) 5 % cream Apply topically 2 times daily x 2-3 weeks. Avoid the eyes. (Patient taking differently: Apply topically 2 times daily x 2-3 weeks. Avoid the eyes. ) 40 g 0    albuterol sulfate HFA (VENTOLIN HFA) 108 (90 Base) MCG/ACT inhaler Inhale 2 puffs into the lungs every 6 hours as needed for Wheezing 1 Inhaler 3    Vitamin D (CHOLECALCIFEROL) 1000 UNITS CAPS capsule Take 1,000 Units by mouth daily. cetirizine (ZYRTEC) 10 MG tablet Take 10 mg by mouth daily. therapeutic multivitamin-minerals (THERAGRAN-M) tablet Take 1 tablet by mouth daily. No current facility-administered medications for this visit. Allergies: Allergies   Allergen Reactions    Bactrim     Vicodin [Hydrocodone-Acetaminophen]            Review of Systems:    Pertinent items are noted in HPI      Vital Signs: There were no vitals filed for this visit. General Exam:     Constitutional: Patient is adequately groomed with no evidence of malnutrition    Physical Exam: lower back      Primary Exam:    Inspection: No deformity atrophy appreciable curvature      Palpation: No focal trigger point tenderness      Range of Motion: 90/10 without pain      Strength: Normal lower extremity      Special Tests: Negative SLR      Skin: There are no rashes, ulcerations or lesions. Gait: Nonantalgic     Neurolgic -Light touch sensation and manual muscle testing normal L2-S1. No fasiculations. Pattella tendon and Achilles tendon reflexes +2 bilaterally. Seated SLR negative       Additional Comments:        Additional Examinations:           Right Lower Extremity: Examination of the right lower extremity does not show any tenderness, deformity or injury. Range of motion is unremarkable. There is no gross instability.   There are no rashes, ulcerations or lesions. Strength and tone are normal.  Left Lower Extremity: There is mild asymmetric soft tissue in about the ankle; mild tenderness over the medial ankle no focal tenderness over the lateral ankle ligamentous complex, no deformity or injury. Range of motion is unremarkable. There is no gross instability. There are no rashes, ulcerations or lesions. Strength and tone are normal.  In situ  , Modified    Office Imaging Results/Procedures PerformedToday:           Office Procedures:     Orders Placed This Encounter   Procedures    EMG     [facility]- Pankaj Damon MD  [address] [place] [address], [address] [place] [address] Road  [phone]    Please call Dr. Sayra Johnson office to schedule your appt. This is your responsibility to schedule, since it is a test order and not a referral.  Results will be sent to Dr. Jesse Farias within 24 hours, so you may schedule your follow up appt 1-2 days after your EMG to go over your results in the clinic. Please call us to schedule your follow up at [phone]. Standing Status:   Future     Standing Expiration Date:   10/3/2023     Order Specific Question:   Which body part? Answer:   LLE    Aircast Air Sport Ankle Brace     Patient was prescribed an Aircast Air Sport Brace. The left ankle will require stabilization / immobilization from this semi-rigid / rigid orthosis to improve their function. The orthosis will assist in protecting the affected area, provide functional support and facilitate healing. Patient was instructed to progress ambulation weight bearing as tolerated in the device. The patient was educated and fit by a healthcare professional with expert knowledge and specialization in brace application while under the direct supervision of the treating physician. Verbal and written instructions for the use of and application of this item were provided.    They were instructed to contact the office immediately should the brace result in increased pain, decreased sensation, increased swelling or worsening of the condition. Other Outside Imaging and Testing Personally Reviewed:    9/20/2022 11:25 AM Anthony, Swoh Incoming Radiology Results From Olga Cabrera MD Results     Radiation Dose Estimates    No radiation information found for this patient  Narrative       Epidural steroid injection, Epidurography       History : back pain, radiculopathy       COMMENTS :       The low back was prepped and draped in sterile fashion and lidocaine used for local anesthesia. Under fluoroscopic guidance, a 22 gauge Tuohy needle was advanced into the epidural space at the L4/5 level from an interlaminar approach and needle position    was documented with contrast injection. 12 mg Celestone, 1cc (2.5 mg) 0.25% Sensorcaine, and 2 cc sterile saline were injected. The patient tolerated the procedure without complication. DIAGNOSIS :       L4/5 translaminar epidural injection of Celestone and Sensorcaine. Pre-procedure pain score : 8/10   Post-procedure pain score : Could not be subjectively determined  at the time of the procedure due to irritation/inflammation caused by injection of epidural medication. This will be assessed at the time of the patient's one week outpatient follow up    communication. Fluoroscopy time : 0.5 minutes   Number of exposures obtained : 4   Blood loss : minimal (less than 5 cc)   Specimens removed : none             Order History    Open Order Details             Assessment   Impression: . Encounter Diagnoses   Name Primary?     Sprain and strain of left ankle     Radicular pain of left lower extremity               Plan:     Medical pain management as per previous and resume gabapentin  Activity modification lumbar spine precautions  EMG left lower extremity and consider SNRI pending results of EMG  Continue maintenance HEP  Functional ankle bracing left ankle    There is no MRI correlation with respect to leftward nerve root compression of the L5 or S1 nerve roots. Proceed with EMG as outlined above. Approximately 30 minutes was spent related to previewing pertinent medical documentation prior to the patient's visit along with counseling during the patient's visit with respect to treatment options inclusive of alternatives to treatment and the complications and risks related to those treatment options along with expectations of outcome related to those treatments and inclusive of time in the documentation and ordering of testing and treatment after the visit. Orders:        Orders Placed This Encounter   Procedures    EMG     [facility]- Ivan Gonzalez MD  [address] [place] [address], [address] [facility] [address] [address]  [phone]    Please call Dr. Kings Leon office to schedule your appt. This is your responsibility to schedule, since it is a test order and not a referral.  Results will be sent to Dr. Carlos Hernandez within 24 hours, so you may schedule your follow up appt 1-2 days after your EMG to go over your results in the clinic. Please call us to schedule your follow up at [phone]. Standing Status:   Future     Standing Expiration Date:   10/3/2023     Order Specific Question:   Which body part? Answer:   LLE    Aircast Air Sport Ankle Brace     Patient was prescribed an Aircast Air Sport Brace. The left ankle will require stabilization / immobilization from this semi-rigid / rigid orthosis to improve their function. The orthosis will assist in protecting the affected area, provide functional support and facilitate healing. Patient was instructed to progress ambulation weight bearing as tolerated in the device. The patient was educated and fit by a healthcare professional with expert knowledge and specialization in brace application while under the direct supervision of the treating physician.   Verbal and written instructions for the use of and application of this item were provided. They were instructed to contact the office immediately should the brace result in increased pain, decreased sensation, increased swelling or worsening of the condition. Negar Louise MD.      Disclaimer: \"This note was dictated with voice recognition software. Though review and correction are routine, we apologize for any errors. \"

## 2022-11-08 ENCOUNTER — PROCEDURE VISIT (OUTPATIENT)
Dept: NEUROLOGY | Age: 52
End: 2022-11-08
Payer: COMMERCIAL

## 2022-11-08 DIAGNOSIS — M79.2 NEUROGENIC PAIN OF LOWER EXTREMITY, LEFT: ICD-10-CM

## 2022-11-08 PROCEDURE — 95908 NRV CNDJ TST 3-4 STUDIES: CPT | Performed by: PSYCHIATRY & NEUROLOGY

## 2022-11-08 PROCEDURE — 95886 MUSC TEST DONE W/N TEST COMP: CPT | Performed by: PSYCHIATRY & NEUROLOGY

## 2022-11-08 NOTE — PROGRESS NOTES
Connor Black M.D. [facility]) Physicians/[facility]  Board Certified in [address] [facility] [address] [place] [address], [address] [place] [address]    EMG / NERVE CONDUCTION STUDY      PATIENT:  Chelly Calixto       DATE OF EM22     YOB: 1970       REASON FOR EMG:   Low back pain left leg pain, rule out lumbar radiculopathy      REFERRING PHYSICIAN:  Mindy Favre, MD  [facility] [address]     SUMMARY:   The left peroneal motor nerve study was normal.  The left posterior tibial motor nerve study was normal.  The left sural sensory nerve study was normal.  Needle EMG of several muscles in the left lower extremity was normal.  Needle EMG of the left lumbar paraspinal muscles was normal.      CLINICAL DIAGNOSIS:  Lumbar radiculopathy        EMG RESULTS:   This is a normal EMG and nerve conduction study of the left lower extremity. There is no electrophysiological evidence for lumbar radiculopathy in this study. ---------------------------------------------  Connor Black M.D.   Electromyographer / Neurologist

## 2022-11-11 ENCOUNTER — TELEPHONE (OUTPATIENT)
Dept: ORTHOPEDIC SURGERY | Age: 52
End: 2022-11-11

## 2022-11-11 NOTE — TELEPHONE ENCOUNTER
General Question     Subject: INTERESTED IN HER EMG RESULTS.   Patient: Garcia Potts Number: [phone]

## 2022-11-14 ENCOUNTER — OFFICE VISIT (OUTPATIENT)
Dept: DERMATOLOGY | Age: 52
End: 2022-11-14
Payer: COMMERCIAL

## 2022-11-14 DIAGNOSIS — D22.9 MULTIPLE NEVI: ICD-10-CM

## 2022-11-14 DIAGNOSIS — L40.9 PSORIASIS: ICD-10-CM

## 2022-11-14 DIAGNOSIS — L71.9 ROSACEA: ICD-10-CM

## 2022-11-14 DIAGNOSIS — L81.4 LENTIGINES: ICD-10-CM

## 2022-11-14 DIAGNOSIS — L57.0 AK (ACTINIC KERATOSIS): Primary | ICD-10-CM

## 2022-11-14 PROCEDURE — 17000 DESTRUCT PREMALG LESION: CPT | Performed by: DERMATOLOGY

## 2022-11-14 PROCEDURE — 99214 OFFICE O/P EST MOD 30 MIN: CPT | Performed by: DERMATOLOGY

## 2022-11-14 PROCEDURE — 17003 DESTRUCT PREMALG LES 2-14: CPT | Performed by: DERMATOLOGY

## 2022-11-14 RX ORDER — AZELAIC ACID 0.15 G/G
GEL TOPICAL
Qty: 50 G | Refills: 5 | Status: SHIPPED | OUTPATIENT
Start: 2022-11-14

## 2022-11-14 NOTE — PROGRESS NOTES
[facility]  Won Milner MD  [address]  1970    50 y.o. female     Date of Visit: 11/14/2022    Chief Complaint: moles, f/u AK's  Chief Complaint   Patient presents with    Skin Exam     FSE       HX:AK     Last seen: ~1 year ago  *her kids went to [employer], just graduated from [employer]    History of Present Illness:    1. Here for f/u for AK's. Here for full skin check. 1 on the nose treated with cryotherapy at last visit and remains clear. She has a new rough lesion in the right paranasal cheek and right forehead. Both are asymptomatic. Lesion on the R cheek treated with efudex  S/p bx of a persistent papule on the L medial cheek that was read as actinic keratosis and then treated with LN2. No probs with previous sites. She has a hx of sunburns, tanning bed use as a teen but wears sunscreen regularly over the past 10 years. 2. She has scattered asx pigmented lesions on the trunk and extremities, present for many years; no change in size/shape/color of any lesions; no bleeding lesions. 3. She has waxing/waning intermittent dry patches on the chest and legs and more recently the left arm. This has been thought to be psoriasis vs dermatitis. They usually spontaneously resolve and can be mildly pruritic. They do not bother her much. Triamcinolone helps. Brother with psoriasis -on a biologic. Twin girls with eczema. They graduated [employer] 2020.    4. F/u rosacea - has erythema and papules flaring on the face but no recent trx tried. Wiliam Rosewood helped in the past.    No personal or family hx of skin cancer. Review of Systems:  Gen: Feels well, good sense of health. Skin: No changing moles or lesions. MuscSkel: No joint pain. Past Medical History, Family History, Surgical History, Medications and Allergies reviewed.     Past Medical History:   Diagnosis Date    Allergy-induced asthma 2/25/2014    AR (allergic rhinitis)     Eczema Morbid obesity with BMI of 40.0-44.9, adult ([facility] [address]     PCO (polycystic ovaries)     Unspecified hypothyroidism        Past Surgical History:   Procedure Laterality Date     SECTION      x3    CHOLECYSTECTOMY      SINUS SURGERY         Outpatient Medications Marked as Taking for the 22 encounter (Office Visit) with Bear Hooker MD   Medication Sig Dispense Refill    gabapentin (NEURONTIN) 300 MG capsule Take 1 capsule by mouth nightly for 30 days. Intended supply: 30 days 30 capsule 1    diclofenac (VOLTAREN) 50 MG EC tablet Take 1 tablet by mouth 2 times daily 60 tablet 1    tiZANidine (ZANAFLEX) 4 MG tablet Take 1 tablet by mouth 3 times daily as needed (Muscle tightness/spasm) 30 tablet 1    levothyroxine (SYNTHROID) 50 MCG tablet Take one daily 90 tablet 3    budesonide-formoterol (SYMBICORT) 160-4.5 MCG/ACT AERO INHALE 2 PUFFS BY MOUTH TWICE DAILY 10.2 g 3    fluorouracil (EFUDEX) 5 % cream Apply topically 2 times daily x 2-3 weeks. Avoid the eyes. (Patient taking differently: Apply topically 2 times daily x 2-3 weeks. Avoid the eyes. ) 40 g 0    albuterol sulfate HFA (VENTOLIN HFA) 108 (90 Base) MCG/ACT inhaler Inhale 2 puffs into the lungs every 6 hours as needed for Wheezing 1 Inhaler 3    Vitamin D (CHOLECALCIFEROL) 1000 UNITS CAPS capsule Take 1,000 Units by mouth daily. cetirizine (ZYRTEC) 10 MG tablet Take 10 mg by mouth daily. therapeutic multivitamin-minerals (THERAGRAN-M) tablet Take 1 tablet by mouth daily.          Allergies   Allergen Reactions    Bactrim     Vicodin [Hydrocodone-Acetaminophen]          Physical Examination     Gen, well-appearing    FSE today    R cheek clear  Nasal tip clear  Right paranasal cheek and right forehead with roughly scaled faintly pink macules  trunk and extremities with scattered brown macules and papules  Left arm and left chest with scaly pink macule/small patch  Cheeks, nose and FH with scattered erythematous small macules and papules and few tiny pustules with background erythema and telangiectasias    Assessment and Plan     1. AK - 1 new on the R NL area and R upper FH  - 2 lesion(s) treated with liquid nitrogen with cryac or swab. Treated with 2 cycles for 1-5 seconds each after consent from patient. Patient educated on risk of blister, hypopigmentation/scar and wound care. Tolerated well. - cont sun protection    2. Benign-appearing nevi and lentigines  - educ re ABCD's of MM   educ sun protection SPF 30+  encouraged skin check yearly (sooner if indicated), self checks    3. psoriasis (ddx dermatitis) - focal areas on the L arm today  - TAC oint bid prn flares; ed se/misuse  - call if worsening    4.  Mild rosacea - mod ET and PP - flaring  - discussed treatment - start finacea daily - bid  - can add oral abx if no improvement - will call if she wants the rx

## 2022-11-15 ENCOUNTER — OFFICE VISIT (OUTPATIENT)
Dept: ORTHOPEDIC SURGERY | Age: 52
End: 2022-11-15
Payer: COMMERCIAL

## 2022-11-15 VITALS — WEIGHT: 255.29 LBS | BODY MASS INDEX: 43.58 KG/M2 | HEIGHT: 64 IN

## 2022-11-15 DIAGNOSIS — M47.816 LUMBAR SPONDYLOSIS: ICD-10-CM

## 2022-11-15 DIAGNOSIS — R20.2 PARESTHESIA OF LEFT LOWER EXTREMITY: ICD-10-CM

## 2022-11-15 DIAGNOSIS — M51.36 DDD (DEGENERATIVE DISC DISEASE), LUMBAR: ICD-10-CM

## 2022-11-15 DIAGNOSIS — G57.02 PIRIFORMIS SYNDROME, LEFT: Primary | ICD-10-CM

## 2022-11-15 DIAGNOSIS — M43.10 DEGENERATIVE SPONDYLOLISTHESIS: ICD-10-CM

## 2022-11-15 PROCEDURE — 99214 OFFICE O/P EST MOD 30 MIN: CPT | Performed by: INTERNAL MEDICINE

## 2022-11-15 RX ORDER — GABAPENTIN 300 MG/1
300 CAPSULE ORAL 2 TIMES DAILY
Qty: 60 CAPSULE | Refills: 1 | Status: SHIPPED | OUTPATIENT
Start: 2022-11-15 | End: 2023-01-14

## 2022-11-16 NOTE — PROGRESS NOTES
Chief Complaint:   Chief Complaint   Patient presents with    Lower Back Pain          History of Present Illness:       Patient is a 50 y.o. female returns follow up for the above complaint. The patient was last seen approximately 6 weeksago. The symptoms show no change since the last visit. The patient has had further testing for the problem. EMG completed in the interim. Poor response to the trial of  gabapentin . Back:Leftleg pain 20:80 . Pain leg is aching or numbness pins-and-needles in quality. The symptoms do follow a neurogenic provocative pattern and L5/S1 dermatomal distribution    Pain levels:5. The patient denies new onset or progressive weakness of the lower extremities. The patient denies now onset bowel or bladder function. She continues on medical pain management as per previous  inclusive of Neurontin    Low back pain remains low level and improved status post L DELONTE                  Past Medical History:        Past Medical History:   Diagnosis Date    Allergy-induced asthma 2/25/2014    AR (allergic rhinitis)     Eczema     Morbid obesity with BMI of 40.0-44.9, adult (HCC)     PCO (polycystic ovaries)     Unspecified hypothyroidism         Present Medications:         Current Outpatient Medications   Medication Sig Dispense Refill    gabapentin (NEURONTIN) 300 MG capsule Take 1 capsule by mouth 2 times daily for 60 days.  Intended supply: 30 days 60 capsule 1    Azelaic Acid (FINACEA) 15 % GEL Apply to affected area daily 50 g 5    triamcinolone (KENALOG) 0.1 % ointment Apply to affected area BID PRN flares 80 g 2    diclofenac (VOLTAREN) 50 MG EC tablet Take 1 tablet by mouth 2 times daily 60 tablet 1    tiZANidine (ZANAFLEX) 4 MG tablet Take 1 tablet by mouth 3 times daily as needed (Muscle tightness/spasm) 30 tablet 1    levothyroxine (SYNTHROID) 50 MCG tablet Take one daily 90 tablet 3    budesonide-formoterol (SYMBICORT) 160-4.5 MCG/ACT AERO INHALE 2 PUFFS BY MOUTH TWICE DAILY 10.2 g 3    fluorouracil (EFUDEX) 5 % cream Apply topically 2 times daily x 2-3 weeks. Avoid the eyes. (Patient taking differently: Apply topically 2 times daily x 2-3 weeks. Avoid the eyes. ) 40 g 0    albuterol sulfate HFA (VENTOLIN HFA) 108 (90 Base) MCG/ACT inhaler Inhale 2 puffs into the lungs every 6 hours as needed for Wheezing 1 Inhaler 3    Vitamin D (CHOLECALCIFEROL) 1000 UNITS CAPS capsule Take 1,000 Units by mouth daily. cetirizine (ZYRTEC) 10 MG tablet Take 10 mg by mouth daily. therapeutic multivitamin-minerals (THERAGRAN-M) tablet Take 1 tablet by mouth daily. No current facility-administered medications for this visit. Allergies: Allergies   Allergen Reactions    Bactrim     Vicodin [Hydrocodone-Acetaminophen]            Review of Systems:    Pertinent items are noted in HPI    Review of systems reviewed from Patient History Form dated on    and   available in the patient's chart under the Media tab. Vital Signs: There were no vitals filed for this visit. General Exam:     Constitutional: Patient is adequately groomed with no evidence of malnutrition    Physical Exam: lower back      Primary Exam:    Inspection: No deformity atrophy appreciable curvature      Palpation: No focal trigger point tenderness      Range of Motion: 90/30 without pain      Strength: Normal lower extremity      Special Tests: SLR suggestive left, piriformis provocative suggestive; asymmetric tightness with piriformis stretching-left      Skin: There are no rashes, ulcerations or lesions.       Gait: Nonantalgic     Neurovascular - non focal and intact       Additional Comments:        Additional Examinations:                    Office Imaging Results/Procedures PerformedToday:           Office Procedures:     Orders Placed This Encounter   Procedures    MRI PELVIS WO CONTRAST     [facility], please contact pt to schedule, [address] will obtain auth and fwd to your facility. Pt advised to f/u in clinic 2-3 days after MRI for results. Standing Status:   Future     Standing Expiration Date:   11/15/2023     Order Specific Question:   Reason for exam:     Answer:   eval L piriformis, r/o tear, eval L sciatic nerve     Order Specific Question:   What is the sedation requirement? Answer:   None           Other Outside Imaging and Testing Personally Reviewed:      Blanca Paula M.D. [facility]  Board Certified in [address] [facility]     EMG / NERVE CONDUCTION STUDY        PATIENT:  Caro Arora        DATE OF EM22      YOB: 1970        REASON FOR EMG:   Low back pain left leg pain, rule out lumbar radiculopathy        REFERRING PHYSICIAN:  Joanie Wise MD  [facility]      SUMMARY:   The left peroneal motor nerve study was normal.  The left posterior tibial motor nerve study was normal.  The left sural sensory nerve study was normal.  Needle EMG of several muscles in the left lower extremity was normal.  Needle EMG of the left lumbar paraspinal muscles was normal.        CLINICAL DIAGNOSIS:  Lumbar radiculopathy           EMG RESULTS:   This is a normal EMG and nerve conduction study of the left lower extremity. There is no electrophysiological evidence for lumbar radiculopathy in this study. ---------------------------------------------  Blanca Paula M.D. Electromyographer / Neurologist    MRI lumbar spine 2022    CONCLUSION:   1. Mild degenerative L4-L5 anterolisthesis with severe left greater right facet arthropathy    with capsulitis. Mild spinal canal stenosis. Findings may correlate with patient's symptoms. No nerve impingement. 2. Bilateral SI joint osteoarthritis. Thank you for the opportunity to provide your interpretation.                Elsa Cat MD A: CK/SJP/CP/tv 08/31/2022 8:52 AM   T: TV 08/30/2022 1:47 PM     Assessment   Impression: . Encounter Diagnoses   Name Primary? Piriformis syndrome, left Yes    Paresthesia of left lower extremity     Degenerative spondylolisthesis     DDD (degenerative disc disease), lumbar     Lumbar spondylosis               Plan:     Titrate gabapentin to 600 mg daily divided dosing  Piriformis home exercise stretching program  MRI of the pelvis left piriformis and sciatic nerve for entrapment/extrinsic compression/nerve sheath pathology  Consider ultrasound-guided piriformis injection on follow-up      There is no MRI correlation with respect to leftward nerve root compression of the L5 or S1 nerve roots. Proceed with EMG as outlined above. Other etiologies to consider but of lesser clinical suspicion at this time would include SI mediated or facet mediated lower limb pain   Orders:        Orders Placed This Encounter   Procedures    MRI PELVIS WO CONTRAST     [facility], please contact pt to schedule, [address] will obtain auth and fwd to your facility. Pt advised to f/u in clinic 2-3 days after MRI for results. Standing Status:   Future     Standing Expiration Date:   11/15/2023     Order Specific Question:   Reason for exam:     Answer:   eval L piriformis, r/o tear, eval L sciatic nerve     Order Specific Question:   What is the sedation requirement? Answer:   None         Kanika Harrison MD.      Disclaimer: \"This note was dictated with voice recognition software. Though review and correction are routine, we apologize for any errors. \"

## 2022-11-20 DIAGNOSIS — R05.9 COUGH: ICD-10-CM

## 2022-11-21 RX ORDER — ALBUTEROL SULFATE 90 UG/1
2 AEROSOL, METERED RESPIRATORY (INHALATION) EVERY 6 HOURS PRN
Qty: 1 EACH | Refills: 3 | Status: SHIPPED | OUTPATIENT
Start: 2022-11-21

## 2022-11-21 NOTE — TELEPHONE ENCOUNTER
Medication:   Requested Prescriptions     Pending Prescriptions Disp Refills    albuterol sulfate HFA (VENTOLIN HFA) 108 (90 Base) MCG/ACT inhaler 1 each 3     Sig: Inhale 2 puffs into the lungs every 6 hours as needed for Wheezing        Last Filled:  01/05/2018 #1 3rf    Patient Phone Number: [phone] (home) [phone] (work)    Last appt: 7/25/2022   Next appt: Visit date not found    Last OARRS: No flowsheet data found.

## 2022-11-28 ENCOUNTER — OFFICE VISIT (OUTPATIENT)
Dept: ORTHOPEDIC SURGERY | Age: 52
End: 2022-11-28
Payer: COMMERCIAL

## 2022-11-28 VITALS — BODY MASS INDEX: 43.58 KG/M2 | HEIGHT: 64 IN | WEIGHT: 255.29 LBS

## 2022-11-28 DIAGNOSIS — G57.02 PIRIFORMIS SYNDROME, LEFT: Primary | ICD-10-CM

## 2022-11-28 DIAGNOSIS — M48.061 DEGENERATIVE LUMBAR SPINAL STENOSIS: ICD-10-CM

## 2022-11-28 DIAGNOSIS — M47.816 LUMBAR SPONDYLOSIS: ICD-10-CM

## 2022-11-28 DIAGNOSIS — M51.36 DDD (DEGENERATIVE DISC DISEASE), LUMBAR: ICD-10-CM

## 2022-11-28 PROCEDURE — 20550 NJX 1 TENDON SHEATH/LIGAMENT: CPT | Performed by: INTERNAL MEDICINE

## 2022-11-28 PROCEDURE — 99204 OFFICE O/P NEW MOD 45 MIN: CPT | Performed by: INTERNAL MEDICINE

## 2022-11-28 RX ORDER — BETAMETHASONE SODIUM PHOSPHATE AND BETAMETHASONE ACETATE 3; 3 MG/ML; MG/ML
6 INJECTION, SUSPENSION INTRA-ARTICULAR; INTRALESIONAL; INTRAMUSCULAR; SOFT TISSUE ONCE
Status: COMPLETED | OUTPATIENT
Start: 2022-11-28 | End: 2022-11-28

## 2022-11-28 RX ORDER — BUPIVACAINE HYDROCHLORIDE 2.5 MG/ML
8 INJECTION, SOLUTION INFILTRATION; PERINEURAL ONCE
Status: COMPLETED | OUTPATIENT
Start: 2022-11-28 | End: 2022-11-28

## 2022-11-28 RX ORDER — LIDOCAINE HYDROCHLORIDE 10 MG/ML
5 INJECTION, SOLUTION INFILTRATION; PERINEURAL ONCE
Status: COMPLETED | OUTPATIENT
Start: 2022-11-28 | End: 2022-11-28

## 2022-11-28 RX ADMIN — BETAMETHASONE SODIUM PHOSPHATE AND BETAMETHASONE ACETATE 6 MG: 3; 3 INJECTION, SUSPENSION INTRA-ARTICULAR; INTRALESIONAL; INTRAMUSCULAR; SOFT TISSUE at 17:03

## 2022-11-28 RX ADMIN — BUPIVACAINE HYDROCHLORIDE 20 MG: 2.5 INJECTION, SOLUTION INFILTRATION; PERINEURAL at 17:03

## 2022-11-28 RX ADMIN — LIDOCAINE HYDROCHLORIDE 5 ML: 10 INJECTION, SOLUTION INFILTRATION; PERINEURAL at 17:04

## 2022-11-29 NOTE — PROGRESS NOTES
Chief Complaint:   Chief Complaint   Patient presents with    Lower Back Pain     F/U lumbar, no change since last visit. Everything is the same as far as pain and areas of pain in L leg. History of Present Illness:       Patient is a 50 y.o. female returns follow up for the above complaint. The patient was last seen approximately 2 weeksago. The symptoms show no change since the last visit. The patient has had no further testing for the problem. MRI completed in the interim. Back:Leftleg pain 0:100 . Pain leg is stabbing, burning, or pins-and-needles and numbness in quality. Symptoms following L5/S1 dermatomal distribution radiating into the foot and ankle    The symptoms do not follow a discogenic provocative pattern. Pain levels:5. The patient denies new onset or progressive weakness of the lower extremities. The patient denies now onset bowel or bladder function. She continues on medical pain management as per previous  inclusive of Neurontin                    Past Medical History:        Past Medical History:   Diagnosis Date    Allergy-induced asthma 2/25/2014    AR (allergic rhinitis)     Eczema     Morbid obesity with BMI of 40.0-44.9, adult ([facility])     PCO (polycystic ovaries)     Unspecified hypothyroidism         Present Medications:         Current Outpatient Medications   Medication Sig Dispense Refill    albuterol sulfate HFA (VENTOLIN HFA) 108 (90 Base) MCG/ACT inhaler Inhale 2 puffs into the lungs every 6 hours as needed for Wheezing 1 each 3    gabapentin (NEURONTIN) 300 MG capsule Take 1 capsule by mouth 2 times daily for 60 days.  Intended supply: 30 days 60 capsule 1    Azelaic Acid (FINACEA) 15 % GEL Apply to affected area daily 50 g 5    triamcinolone (KENALOG) 0.1 % ointment Apply to affected area BID PRN flares 80 g 2    diclofenac (VOLTAREN) 50 MG EC tablet Take 1 tablet by mouth 2 times daily 60 tablet 1    tiZANidine (ZANAFLEX) 4 MG tablet Take 1 tablet by mouth 3 times daily as needed (Muscle tightness/spasm) 30 tablet 1    levothyroxine (SYNTHROID) 50 MCG tablet Take one daily 90 tablet 3    budesonide-formoterol (SYMBICORT) 160-4.5 MCG/ACT AERO INHALE 2 PUFFS BY MOUTH TWICE DAILY 10.2 g 3    fluorouracil (EFUDEX) 5 % cream Apply topically 2 times daily x 2-3 weeks. Avoid the eyes. (Patient taking differently: Apply topically 2 times daily x 2-3 weeks. Avoid the eyes. ) 40 g 0    Vitamin D (CHOLECALCIFEROL) 1000 UNITS CAPS capsule Take 1,000 Units by mouth daily. cetirizine (ZYRTEC) 10 MG tablet Take 10 mg by mouth daily. therapeutic multivitamin-minerals (THERAGRAN-M) tablet Take 1 tablet by mouth daily. No current facility-administered medications for this visit. Allergies: Allergies   Allergen Reactions    Bactrim     Vicodin [Hydrocodone-Acetaminophen]            Review of Systems:    Pertinent items are noted in HPI         Vital Signs: There were no vitals filed for this visit. General Exam:     Constitutional: Patient is adequately groomed with no evidence of malnutrition    Physical Exam: lower back/pelvis      Primary Exam:    Inspection: No deformity atrophy appreciable effusion      Palpation: There is tenderness in the mid gluteal region left      Range of Motion: 90/20 without pain      Special Tests: Negative SLR      Skin: There are no rashes, ulcerations or lesions.       Gait: Nonantalgic     Neurovascular - non focal and intact       Additional Comments:        Additional Examinations:                   Office Imaging Results/Procedures PerformedToday:          Office Procedures:     Orders Placed This Encounter   Procedures    US EXTREMITY LEFT NON VASC LIMITED     Standing Status:   Future     Number of Occurrences:   1     Standing Expiration Date:   11/28/2023     Order Specific Question:   Reason for exam:     Answer:   dx    US GUIDED NEEDLE PLACEMENT     Standing Status:   Future     Number of Occurrences:   1     Standing Expiration Date:   11/28/2023     Order Specific Question:   Reason for exam:     Answer:   [id number] [facility]     Referral Priority:   Routine     Referral Type:   Eval and Treat     Referral Reason:   Specialty Services Required     Requested Specialty:   Physical Therapist     Number of Visits Requested:   1    DC INJECT TENDON SHEATH/LIGAMENT     Ultrasound-guided left piriformis injection  Logiq ultrasound 4 MHz    Patient position prone on examination table with the pelvis supported by pillow. The procedure was challenging to perform related to the patient's obese body habitus. Curvilinear transducer was utilized and the greater sciatic notch was localized in the piriformis muscle was visualized in long axis from medial to lateral.  The piriformis muscle was evaluated dynamically at the level overlying the ischium. The fascial plane between the gluteus sunil and piriformis muscle was localized at approximately 6 cm depth. Sterile prep was performed topical anesthetic was utilized. Using longitudinal technique 5 cc of 1% lidocaine was injected subcutaneously using 25-gauge needle. A 20-gauge spinal needle was then advanced in the same needle tract and this was advanced to the surface of the piriformis and an additional 2 to 3 cc of 0.25% Marcaine was utilized. Needle tip was able to be advanced to the surface of the piriformis muscle with manual pressure and maximum length of the needle was utilized. Using exchange of syringe technique a mixture containing 3 cc of 0.25% Marcaine and 1 cc of Celestone was injected this was visualized to deposit partially within the piriformis tendon sheath and within the fascial plane outside the tendon sheath. Needle was withdrawn.     Images stored          Other Outside Imaging and Testing Personally Reviewed:    MRI Pelvis WO Contrast    Result Date: 11/22/2022  Site: [facility] #: [id number] #: [id number] Procedure: MR Pelvis w/o Contrast ; Reason for Exam: Dx: Piriformis syndrome, left, neurogenic pain of lower extremity, acute midline low back pain with left-sided sciatica, radicular pain of left lower extremity per script This document is confidential medical information. Unauthorized disclosure or use of this information is prohibited by law. If you are not the intended recipient of this document, please advise us by calling immediately [phone]. [employer], [address] [place] [address] Patient Name: Artie Greenfield Case ID: [id number] Patient : 1970 Referring Physician: Bear Fields MD Exam Date: 2022 Exam Description: MR Pelvis w/o Contrast HISTORY:  Piriformis syndrome. Lower extremity pain/sciatica. TECHNICAL FACTORS:  Long- and short-axis fat- and water-weighted images were performed. COMPARISON:  None. FINDINGS:  Minor arthrosis left greater than right hips. No fracture or avascular necrosis. No substantial joint effusion. Sacroiliac joints unremarkable. No pelvic fracture seen. Lower lumbar facet capsulitis noted. Grade 1 spondylolisthesis of L4 on L5. Piriformis muscles with normal signal. Uterus anteverted. No adnexal mass or free fluid. Urinary bladder normal. The hamstring, gluteal and iliopsoas tendons intact. Soft tissue surrounding the hips unremarkable. Small fat-containing umbilical hernia is noted. CONCLUSION: Minor arthrosis left greater than right hips. Otherwise unremarkable MRI pelvis. Thank you for the opportunity to provide your interpretation.  Charlene Méndez MD A: SADE/dusty 2022 4:29 PM T: Loly Lopez 2022 3:44 PM     US GUIDED NEEDLE PLACEMENT    Result Date: 2022  Radiology result is complete; follow up with provider / physician office for radiology results     US EXTREMITY LEFT NON VASC LIMITED    Result Date: 2022  Radiology result is complete; follow up with provider / physician office for radiology results              Assessment   Impression: . Encounter Diagnoses   Name Primary? Piriformis syndrome, left Yes    Degenerative lumbar spinal stenosis     DDD (degenerative disc disease), lumbar     Lumbar spondylosis               Plan:       Postinjection protocol  Continue/resume piriformis stretching program  Formal course of PT piriformis program inclusive of myofascial techniques  Continue gabapentin at current dosage 600 mg/day divided dosing         Orders:        Orders Placed This Encounter   Procedures    US EXTREMITY LEFT NON VASC LIMITED     Standing Status:   Future     Number of Occurrences:   1     Standing Expiration Date:   11/28/2023     Order Specific Question:   Reason for exam:     Answer:   dx    US GUIDED NEEDLE PLACEMENT     Standing Status:   Future     Number of Occurrences:   1     Standing Expiration Date:   11/28/2023     Order Specific Question:   Reason for exam:     Answer:   [facility]     Referral Priority:   Routine     Referral Type:   Eval and Treat     Referral Reason:   Specialty Services Required     Requested Specialty:   Physical Therapist     Number of Visits Requested:   1    Dorina Bates MD.      [facility] [employer]: \"This note was dictated with voice recognition software. Though review and correction are routine, we apologize for any errors. \"

## 2022-12-02 ENCOUNTER — HOSPITAL ENCOUNTER (OUTPATIENT)
Dept: MAMMOGRAPHY | Age: 52
Discharge: HOME | End: 2022-12-02
Payer: COMMERCIAL

## 2022-12-02 VITALS — HEIGHT: 64 IN | WEIGHT: 250 LBS | BODY MASS INDEX: 42.68 KG/M2

## 2022-12-02 DIAGNOSIS — Z12.31 BREAST CANCER SCREENING BY MAMMOGRAM: ICD-10-CM

## 2022-12-02 PROCEDURE — 77063 BREAST TOMOSYNTHESIS BI: CPT

## 2022-12-05 ENCOUNTER — HOSPITAL ENCOUNTER (OUTPATIENT)
Dept: PHYSICAL THERAPY | Age: 52
Setting detail: THERAPIES SERIES
Discharge: HOME | End: 2022-12-05
Payer: COMMERCIAL

## 2022-12-05 PROCEDURE — 97530 THERAPEUTIC ACTIVITIES: CPT

## 2022-12-05 PROCEDURE — 97161 PT EVAL LOW COMPLEX 20 MIN: CPT

## 2022-12-05 NOTE — FLOWSHEET NOTE
[address] [facility] Physical Therapy  Phone: [phone]   Fax: [phone]    Physical Therapy Daily Treatment Note    Date:  2022     Patient Name:  Alena Vang    :  1970  MRN: [id number]  Medical Diagnosis:  Piriformis syndrome, left [G57.02]  Treatment Diagnosis: sacrum dysfunction  Insurance/Certification information:  PT Insurance Information: anthem  Physician Information:  Severo Minium, [address] [place] [address] signed (Y/N): []  Yes [x]  No     Date of Patient follow up with Physician:      Progress Report: []  Yes  [x]  No     Date Range for reporting period:  Beginnin2022  Ending:     Progress report due (10 Rx/or 30 days whichever is less): visit #10 or 3/1/17      Recertification due (POC duration/ or 90 days whichever is less): visit #12 or 3/5/23     Visit # Insurance Allowable Auth required?  Date Range    40 []  Yes  []  No            Latex Allergy:  [x]NO      []YES  Preferred Language for Healthcare:   [x]English       []other:    Functional Scale:           Date assessed:  TO physical FS primary measure score = 38; risk adjusted = 57  22    Pain level:  5/10     SUBJECTIVE:  See eval    OBJECTIVE: See eval      RESTRICTIONS/PRECAUTIONS:     Exercises/Interventions:     Therapeutic Exercises (63436) Resistance / level Sets/sec Reps Notes                                                                  Therapeutic Activities (83320)       Pt taught body mechanics   X 10 mins                         Gait (03297)                                   Neuromuscular Re-ed (68484)                                                 Manual Intervention (03134)       STM to sacral area, gentle mobs to lat shifted sacrum    X 5 mins                                           Modalities:     Pt. Education:  2022  -patient educated on diagnosis, prognosis and expectations for rehab  -all patient questions were answered  : pt taught correct body mechanics to protect her LB, reviewed her HEP and she is to continue it  Home Exercise Program:  12/5: pirfiormis stretching, fig 4 stretching, SKTC       Therapeutic Exercise and NMR EXR  [] (82716) Provided verbal/tactile cueing for activities related to strengthening, flexibility, endurance, ROM for improvements in  [] LE / Lumbar: LE, proximal hip, and core control with self care, mobility, lifting, ambulation. [] UE / Cervical: cervical, postural, scapular, scapulothoracic and UE control with self care, reaching, carrying, lifting, house/yardwork, driving, computer work.  [] (90510) Provided verbal/tactile cueing for activities related to improving balance, coordination, kinesthetic sense, posture, motor skill, proprioception to assist with   [] LE / lumbar: LE, proximal hip, and core control in self care, mobility, lifting, ambulation and eccentric single leg control. [] UE / cervical: cervical, scapular, scapulothoracic and UE control with self care, reaching, carrying, lifting, house/yardwork, driving, computer work.   [] (83630) Therapist is in constant attendance of 2 or more patients providing skilled therapy interventions, but not providing any significant amount of measurable one-on-one time to either patient, for improvements in  [] LE / lumbar: LE, proximal hip, and core control in self care, mobility, lifting, ambulation and eccentric single leg control. [] UE / cervical: cervical, scapular, scapulothoracic and UE control with self care, reaching, carrying, lifting, house/yardwork, driving, computer work.      NMR and Therapeutic Activities:    [x] (61286 or 78508) Provided verbal/tactile cueing for activities related to improving balance, coordination, kinesthetic sense, posture, motor skill, proprioception and motor activation to allow for proper function of   [x] LE: / Lumbar core, proximal hip and LE with self care and ADLs  [x] UE / Cervical: cervical, postural, scapular, scapulothoracic and UE control with self care, carrying, lifting, driving, computer work.   [] (83918) Gait Re-education- Provided training and instruction to the patient for proper LE, core and proximal hip recruitment and positioning and eccentric body weight control with ambulation re-education including up and down stairs     Home Management Training / Self Care:  [] (10460) Provided self-care/home management training related to activities of daily living and compensatory training, and/or use of adaptive equipment for improvement with: ADLs and compensatory training, meal preparation, safety procedures and instruction in use of adaptive equipment, including bathing, grooming, dressing, personal hygiene, basic household cleaning and chores.      Home Exercise Program:    [x] (07320) Reviewed/Progressed HEP activities related to strengthening, flexibility, endurance, ROM of   [] LE / Lumbar: core, proximal hip and LE for functional self-care, mobility, lifting and ambulation/stair navigation   [] UE / Cervical: cervical, postural, scapular, scapulothoracic and UE control with self care, reaching, carrying, lifting, house/yardwork, driving, computer work  [] (80015)Reviewed/Progressed HEP activities related to improving balance, coordination, kinesthetic sense, posture, motor skill, proprioception of   [] LE: core, proximal hip and LE for self care, mobility, lifting, and ambulation/stair navigation    [] UE / Cervical: cervical, postural,  scapular, scapulothoracic and UE control with self care, reaching, carrying, lifting, house/yardwork, driving, computer work    Manual Treatments:  PROM / STM / Oscillations-Mobs:  G-I, II, III, IV (PA's, Inf., Post.)  [] (03752) Provided manual therapy to mobilize LE, proximal hip and/or LS spine soft tissue/joints for the purpose of modulating pain, promoting relaxation,  increasing ROM, reducing/eliminating soft tissue swelling/inflammation/restriction, improving soft tissue extensibility and allowing for proper ROM for normal function with   [] LE / lumbar: self care, mobility, lifting and ambulation. [] UE / Cervical: self care, reaching, carrying, lifting, house/yardwork, driving, computer work. Modalities:  [] (51906) Vasopneumatic compression: Utilized vasopneumatic compression to decrease edema / swelling for the purpose of improving mobility and quad tone / recruitment which will allow for increased overall function including but not limited to self-care, transfers, ambulation, and ascending / descending stairs. Charges:  Timed Code Treatment Minutes: 10   Total Treatment Minutes: 40     [x] EVAL - LOW (02050)   [] EVAL - MOD (06612)  [] EVAL - HIGH (90962)  [] RE-EVAL (03518)  [] CG(20308) x       [] Ionto  [] NMR (07461) x       [] Vaso  [] Manual (36988) x       [] Ultrasound  [x] TA x        [] Mech Traction (16723)  [] Aquatic Therapy x     [] ES (un) (76690):   [] Home Management Training x  [] ES(attended) (32675)   [] Dry Needling 1-2 muscles (75920):  [] Dry Needling 3+ muscles (391010)  [] Group:      [] Other:     GOALS:   Patient stated goal: walking and exercising with no pain  [] Progressing: [] Met: [] Not Met: [] Adjusted     Therapist goals for Patient:   Short Term Goals: To be achieved in: 2 weeks  1. Independent in HEP and progression per patient tolerance, in order to prevent re-injury. [] Progressing: [] Met: [] Not Met: [] Adjusted  2. Patient will have a decrease in pain to facilitate improvement in movement, function, and ADLs as indicated by improvement with respect to Functional Deficits. [] Progressing: [] Met: [] Not Met: [] Adjusted     Long Term Goals: To be achieved in: 6 weeks  1. FOTO functional survey score of >/= 61  to assist with reaching prior level of function. [] Progressing: [] Met: [] Not Met: [] Adjusted  2.  Patient will demonstrate increased AROM  to [facility], to allow for proper joint functioning to allow pt to resume walking  without increase in symptoms. [] Progressing: [] Met: [] Not Met: [] Adjusted  3. Patient will demonstrate increased Strength and core activation to allow for proper functional mobility as indicated by patients Functional Deficits to allow pt to resume exercising without increase in symptoms. [] Progressing: [] Met: [] Not Met: [] Adjusted  4. Patient will return to functional activities including driving to work without increased symptoms or restriction. [] Progressing: [] Met: [] Not Met: [] Adjusted    Overall Progression Towards Functional goals/ Treatment Progress Update:  [] Patient is progressing as expected towards functional goals listed. [] Progression is slowed due to complexities/Impairments listed. [] Progression has been slowed due to co-morbidities. [x] Plan just implemented, too soon to assess goals progression <30days   [] Goals require adjustment due to lack of progress  [] Patient is not progressing as expected and requires additional follow up with physician  [] Other    Persisting Functional Limitations/Impairments:  [x]Sleeping []Sitting               [x]Standing []Transfers        [x]Walking []Kneeling               [x]Stairs [x]Squatting / bending   [x]ADLs [x]Reaching  [x]Lifting  []Housework  [x]Driving []Job related tasks  []Sports/Recreation []Other:        ASSESSMENT:  See eval  Treatment/Activity Tolerance:  [x] Patient able to complete tx [] Patient limited by fatigue  [] Patient limited by pain  [] Patient limited by other medical complications  [] Other:     Prognosis: [x] Good [] Fair  [] Poor    Patient Requires Follow-up: [x] Yes  [] No    Plan for next treatment session:    PLAN: See eval. PT 2x / week for 6 weeks.    [] Continue per plan of care [] Alter current plan (see comments)  [x] Plan of care initiated [] Hold pending MD visit [] Discharge    Electronically signed by: Marko Davis, PT PT, DPT    Note: If patient does not return for scheduled/ recommended follow up visits, this note will serve as a discharge from care along with most recent update on progress.

## 2022-12-05 NOTE — PLAN OF CARE
[address] [address], [address]  Phone: [phone]   Fax: [phone]                                                       Physical Therapy Certification    Dear Leena Garcia, Kalen Brewster*  ,    We had the pleasure of evaluating the following patient for physical therapy services at [address] [place] [address]. A summary of our findings can be found in the initial assessment below. This includes our plan of care. If you have any questions or concerns regarding these findings, please do not hesitate to contact me at the office phone number checked above. Thank you for the referral.       Physician Signature:_______________________________Date:__________________  By signing above (or electronic signature), therapists plan is approved by physician      Patient: Nimisha Daily   : 1970   MRN: [id number]  Referring Physician: Rahel Mcneil*        Evaluation Date: 2022      Medical Diagnosis Information:  Diagnosis: G57.02 (ICD-10-CM) - Piriformis syndrome, left   PT diagnosis: sacrum dysfunction                                          Insurance information: PT Insurance Information: [facility]      Preferred Language for Healthcare:   [x]English       []Other:    C-SSRS Triggered by Intake questionnaire (Past 2 wk assessment ):   [x] No, Questionnaire did not trigger screening.   [] Yes, Patient intake triggered C-SSRS Screening     [] Completed, no further action required. [] Completed, PCP notified via . [address] multiple tests , cortisone in piriformis,L4 L5 cortisone shot , pain in down L LE to ankle, has been wearing air cast because it helped her ankle. Hurts to lie on her back at tailbone. L handed. Has tried pirfiormis stretching. Best result from piriformis injection. ONSET: just started with no incident    Fear avoidance:  I should not do physical activities that (might) make my pain worse   [] True   [] False     Current Level of Function:drives to [place] 1 x a week , has 3 children     Prior Level of Function: Prior to this injury / incident, pt was independent with ADLs and IADLs    Living Status: lives with family  Occupation/School: head of alumni office     PAIN:  Pain Scale: 5/10  Easing factors: meds only  Provocative factors: eveything    Do you have any of the following? Numbness/tingling/change in sensation/strength? no  History of trauma? no  Bowel bladder symptoms? no  Changes in walking, balance, falls? no  Dizziness/HAs? no  Face symptoms/difficulty with speech/swallowing/change in taste/smell? no    Functional Outcome: FOTO: raw score = 38, risk adjusted score:57  ,  taken at initial eval    Precautions/ Contra-indications:   Latex Allergy:  [x]NO      []YES    Relevant Medical History:    [x] Patient history, allergies, meds reviewed. Medical chart reviewed. See intake form. Review Of Systems (ROS):  [x]Performed Review of systems (Integumentary, CardioPulmonary, Neurological) by intake and observation. Intake form has been scanned into medical record. Patient has been instructed to contact their primary care physician regarding ROS issues if not already being addressed at this time.       Co-morbidities/Complexities (which will affect course of rehabilitation):  []None        []Hx of COVID   Arthritic conditions   []Rheumatoid arthritis (M05.9)  []Osteoarthritis (M19.91)  []Gout   Cardiovascular conditions   []Hypertension (I10)  []Hyperlipidemia (E78.5)  []Angina pectoris (I20)  []Atherosclerosis (I70)  []Pacemaker  []Hx of CABG/stent/  cardiac surgeries   Musculoskeletal conditions   []Disc pathology   []Congenital spine pathologies   []Osteoporosis (M81.8)  []Osteopenia (M85.8)  []Scoliosis       Endocrine conditions   [x]Hypothyroid (E03.9)  []Hyperthyroid Gastrointestinal conditions   []Constipation (K59.00) Metabolic conditions   []Morbid obesity (E66.01)  []Diabetes type 1(E10.65) or 2 (E11.65)   []Neuropathy (G60.9)     Cardio/Pulmonary conditions   [x]Asthma (J45)  []Coughing   []COPD (J44.9)  []CHF  []A-fib   Psychological Disorders  []Anxiety (F41.9)  []Depression (F32.9)   []Other:   Developmental Disorders  []Autism (F84.0)  []CP (G80)  []Down Syndrome (Q90.9)  []Developmental delay     Neurological conditions  []Prior Stroke (I69.30)  []Parkinson's (G20)  []Encephalopathy (G93.40)  []MS (G35)  []Post-polio (G14)  []SCI  []TBI  []ALS Other conditions  []Fibromyalgia (M79.7)  []Vertigo  []Syncope  []Kidney Failure  []Cancer      []currently undergoing                treatment  []Pregnancy  []Incontinence   Prior surgeries  []involved limb  []previous spinal surgery  [] section birth  []hysterectomy  []bowel / bladder surgery  []other relevant surgeries   []Other:                OBJECTIVE:     Functional strength:  Heel walk (L4):  Able [x] Unable []  Toe walk (S1):   Able [x] Unable []  SL squat to table (L2-3):  Able [x] Unable []  Unilateral HR (S1-2):  Able [x] Unable []  Normal = 10x      Dermatomes Normal Abnormal Comments   inguinal area (L1)  x     anterior mid-thigh (L2) x     distal ant thigh/med knee (L3) x     Anterior knee, medial lower leg & great toe (L4) x     lateral lower leg & dorsal foot (L5) x     Posterior/lateral calf (S1) x     Posterior/medial calf and medial calcaneus (S2) x       Top of head (C1) x     Posterior occipital region (C2) x     Side of neck (C3) x     Top of shoulder (C4) x     Lateral deltoid (C5) x     Radial aspect: thumb, digit #2, forearm (C6) x     Middle finger-ant and post (C7) x     Ulnar aspect: digit 4&5,forearm (C8) x     Medial elbow (T1) x         ROM  Comments   Lumbar Flex Painful     Lumbar Ext painful      ROM LEFT RIGHT Comments   Lumbar SB      Ext and SB      Flex and SB      Thoracic/Lumbar Rotation   seated   Hip IR      Segmental mobility Posture:  WNL    Palpation:   Myofascial pain/tenderness:extreme tightness and tenderness at B LE  L>R     ROM Right Left Comments   Cervical flex WNL WNL    Cervical ext      Cervical SB      Cervical Rot        Endrange holds      Segmental mobility              LE         Hip Flexion WNL WNL    Hip Abd      Hip ER      Hip IR      Hip Extension      Knee Ext      Knee Flex      DF      PF      Ankle INV      Ankle Ever            UE        Shoulder flex      Shoulder AB      Shoulder ER      Shoulder IR                    Palpation: mm tightness at B LE, sacrum rot to L , nutation    Joint mobility:    [x]Normal    []Hypo   []Hyper      Strength / Myotomes RIGHT LEFT   Multifidus 4+ 4+   Transverse Ab 4+ 4+   LE 5 5   Hip Flexors (L1-2) 5 5   Quads (L2-4) 5 5   Ankle Dorsiflexion (L4-5)     Great Toe Extension (L5)     Ankle Eversion (S1-2)     Ankle Plantarflexion (S1-2)     Hip Abductors 4+ 5   Hip Extensors 4+ 5   Hip Internal Rotators     Hip External Rotators     Hamstrings  5 5   Ankle Inversion               Strength / Myotomes RIGHT LEFT   Cervical Flexion (C1-2)     Cervical SB (C3)     Shoulder Shrug (C4)     Shoulder Abduction (C5)     Shoulder ER (C5)     Biceps (C6)     Triceps (C7)     Wrist Extension (C6)     Wrist Flexion (C7)      (C8)     Thumb Abduction (C8)     Finger Abduction (T1)     Shoulder Flex     Shoulder Scap     Shoulder IR                    SUPINE: & PRONE:    TA Muscle Contraction Scale    Criteria                                               Score  Quality of Contraction   Not Present      [] 0   Rapid, Superificial     [] 1   Just Perceptible     [x] 2     Gentle, Slow      [] 3    Substitution   Resting       [] 0   Moderate to Strong     [] 1    Subtle Perceptible     [] 2   None       [x] 3    Symmetry of Contraction   Unilateral       [] 0   Bilateral/Asymmetrical     [] 1   Symmetrical       [x] 2    Breathing     Inability/Difficulty Breathing during contraction [] 0   Able to hold contraction while Breathing  [x] 1    Holding   Able to Hold Contraction <10 s   [x] 0   Able to Hold Contraction >10 s   [] 1      __/10  Adapted from [id number], Copyright 2009        Flexibility LEFT RIGHT Comments   Hip flexors      HS (90/90) WNL WNL    Glut max       piriformis      gastroc            Upper trapezius                            Barriers to/and or personal factors that will affect rehab potential:              []Age  []Sex    []Smoker              []Motivation/Lack of Motivation                        []Co-Morbidities              []Cognitive Function, education/learning barriers              []Environmental, home barriers              []profession/work barriers  []past PT/medical experience  []other:  Justification:    Falls Risk Assessment (30 days):   [x] Falls Risk assessed and no intervention required. [] Falls Risk assessed and Patient requires intervention due to being higher risk   TUG score (>12s at risk):     [] Falls education provided, including         ASSESSMENT: Pt presents with LB mm tightness and sacral dysfunction . These deficits contribute to pain and reduced tolerance of functional activities. Pt will benefit from skilled PT services to restore PLOF.       Functional Impairments:  Lumbar/lower quarter:     [x]Noted lumbar/proximal hip hypomobility   []Noted lumbosacral and/or generalized hypermobility   []Decreased Lumbosacral/hip/LE functional ROM   [x]Decreased core/proximal hip strength and neuromuscular control    []Decreased LE functional strength    []Abnormal reflexes/sensation/myotomal/dermatomal deficits  []Reduced ability to run, hop, cut or jump  []Reduced balance/proprioceptive control    []other:  reduced functional ROM of    []other:  reduce functional strength of    [x]other: myofascial changes and pain at L piriformis   [] Postural impairments:   []other:    Cervical/upper quarter:   []Noted cervical/thoracic/GHJ joint hypomobility   []Noted cervical/thoracic/GHJ joint hypermobility   []Decreased cervical/UE functional ROM   []Noted Headache pain aggravated by neck movements with/without dizziness   []Abnormal reflexes/sensation/myotomal/dermatomal deficits   []Decreased DCF control or ability to hold head up   []Decreased RC/scapular/core strength and neuromuscular control    []Decreased UE functional strength   [] Postural impairments:   []other:      Functional Activity Limitations (from functional questionnaire and intake)   [x]Reduced ability to tolerate prolonged functional positions   [x]Reduced ability or difficulty with changes of positions or transfers between positions   [x]Reduced ability to maintain good posture and demonstrate good body mechanics with sitting, bending, and lifting   [x]Reduced ability to sleep   [x] Reduced ability or tolerance with driving and/or computer work   [x]Reduced ability to perform lifting, reaching, carrying tasks   [x]Reduced ability to squat   [x]Reduced ability to forward bend   [x]Reduced ability to ambulate prolonged functional periods/distances/surfaces   [x]Reduced ability to ascend/descend stairs   []Reduced ability to concentrate    []Reduced ability to tolerate any impact through UE or spine   []other:     Participation Restrictions   []Reduced participation in self care activities   [x]Reduced participation in home management activities   [x]Reduced participation in work activities   [x]Reduced participation in social activities. [x]Reduced participation in sport/recreational activities. Classification:  Lumbar/Lower quarter:   []Signs/symptoms consistent with Lumbar instability/stabilization subgroup. [x]Signs/symptoms consistent with Lumbar mobilization/manipulation subgroup, myotomes and dermatomes intact. Meets manipulation criteria.     []Signs/symptoms consistent with Lumbar direction specific/centralization subgroup   []Signs/symptoms consistent with Lumbar traction subgroup     []Signs/symptoms consistent with lumbar facet dysfunction   []Signs/symptoms consistent with lumbar stenosis type dysfunction   []Signs/symptoms consistent with nerve root involvement including myotome & dermatome dysfunction   []Signs/symptoms consistent with post-surgical status including: decreased ROM, strength and function.    []signs/symptoms consistent with pathology which may benefit from Dry needling    []Signs/symptoms consistent with joint sprain/strain  []Signs/symptoms consistent with patella-femoral syndrome   []Signs/symptoms consistent with knee OA/hip OA   []Signs/symptoms consistent with internal derangement of knee/Hip   []Signs/symptoms consistent with functional hip weakness/NMR control      []Signs/symptoms consistent with tendinitis/tendinosis    []signs/symptoms consistent with pathology which may benefit from Dry needling   []other:      Cervical/ upper quarter  Classification/Subgrouping:   []signs/symptoms consistent with neck pain with mobility deficits     []signs/symptoms consistent with neck pain with movement coordinated impairments    []signs/symptoms consistent with neck pain with radiating pain    []signs/symptoms consistent with neck pain with headaches (cervicogenic) without signs of hypermobility  []signs/symptoms consistent with neck pain with headaches (cervicogenic) with signs of hypermobility   []Signs/symptoms consistent with nerve root involvement including myotome & dermatome dysfunction   []sign/symptoms consistent with facet dysfunction of cervical and thoracic spine    []signs/symptoms consistent suggesting central cord compression/UMN syndromes   []signs/symptoms consistent with discogenic cervical pain   []signs/symptoms consistent with rib dysfunction   [x]signs/symptoms consistent with postural dysfunction   []signs/symptoms consistent with shoulder pathology  []signs/symptoms consistent with vestibular dysfunction    []signs/symptoms consistent with post-surgical status including decreased ROM, strength and function. [x]signs/symptoms consistent with pathology which may benefit from Dry Needling   [x]signs/symptoms which may limit the use of advanced manual therapy techniques: (Hypertension, recent trauma, intolerance to end range positions, prior TIA, visual issues, UE myotomes loss )       Prognosis/Rehab Potential:      []Excellent   [x]Good    []Fair   []Poor    Tolerance of evaluation/treatment:    []Excellent   [x]Good    []Fair   []Poor     Physical Therapy Evaluation Complexity Justification  [x] A history of present problem with:  [x] no personal factors and/or comorbidities that impact the plan of care;  []1-2 personal factors and/or comorbidities that impact the plan of care  []3 personal factors and/or comorbidities that impact the plan of care  [x] An examination of body systems using standardized tests and measures addressing any of the following: body structures and functions (impairments), activity limitations, and/or participation restrictions;:  [x] a total of 1-2 or more elements   [] a total of 3 or more elements   [] a total of 4 or more elements   [x] A clinical presentation with:  [x] stable and/or uncomplicated characteristics   [] evolving clinical presentation with changing characteristics  [] unstable and unpredictable characteristics;   [x] Clinical decision making of [x] low, [] moderate, [] high complexity using standardized patient assessment instrument and/or measurable assessment of functional outcome.     [x] EVAL (LOW) 78799 (typically 15 minutes face-to-face)  [] EVAL (MOD) 91006 (typically 30 minutes face-to-face)  [] EVAL (HIGH) 60709 (typically 45 minutes face-to-face)  [] RE-EVAL     HEP instruction: Written HEP instructions provided and reviewed    PLAN:  strength, ROM/flexibility, posture and body mechs, manual, MOC, HEP, pt education    Frequency/Duration:  2 days per week for 6

## 2022-12-12 ENCOUNTER — HOSPITAL ENCOUNTER (OUTPATIENT)
Dept: PHYSICAL THERAPY | Age: 52
Setting detail: THERAPIES SERIES
Discharge: HOME | End: 2022-12-12
Payer: COMMERCIAL

## 2022-12-12 PROCEDURE — 97110 THERAPEUTIC EXERCISES: CPT

## 2022-12-12 PROCEDURE — 97140 MANUAL THERAPY 1/> REGIONS: CPT

## 2022-12-12 NOTE — FLOWSHEET NOTE
[address] [facility] Physical Therapy  Phone: [phone]   Fax: [phone]    Physical Therapy Daily Treatment Note    Date:  2022     Patient Name:  Gemini Anton    :  1970  MRN: [id number]  Medical Diagnosis:  Piriformis syndrome, left [G57.02]  Treatment Diagnosis: sacrum dysfunction  Insurance/Certification information:  PT Insurance Information: juan carlos  Physician Information:  Jocelyne Nash [address] [place] [address] signed (Y/N): []  Yes [x]  No     Date of Patient follow up with Physician:      Progress Report: []  Yes  [x]  No     Date Range for reporting period:  Beginnin2022  Ending:     Progress report due (10 Rx/or 30 days whichever is less): visit #10 or 27      Recertification due (POC duration/ or 90 days whichever is less): visit #12 or 3/5/23     Visit # Insurance Allowable Auth required? Date Range    40 []  Yes  []  No            Latex Allergy:  [x]NO      []YES  Preferred Language for Healthcare:   [x]English       []other:    Functional Scale:           Date assessed:  TO physical FS primary measure score = 38; risk adjusted = 57  22    Pain level:  5/10 L glut/ LLE     SUBJECTIVE:  Pain continues to radiate along w/ N&T down the LLE to the medial arch and plantar surface of the L foot. It is constant. She has been doing the stretches at home. Standing & walking irritate it the most.    OBJECTIVE:  : MMT prone hip ext: L=4-, R=4+ .  Pt also has severe tightness in L gastroc      RESTRICTIONS/PRECAUTIONS:     Exercises/Interventions:     Therapeutic Exercises (11032) x 25' Resistance / level Sets/time Reps Notes   Recumbent bike L2 4'     IB calf stretch  HR-TR  30\"  1 2  10    HSS on step w/ rotation  30\" 2 L/R    HFS on step  30\" 2 L/R    Sciatic nerve glides supine  3 10 L                                Therapeutic Activities (96225)                                   Gait (14778) Neuromuscular Re-ed (39511)                                                 Manual Intervention (41317) x 20'       STM to sacral area, gentle mobs to lat shifted sacrum   6'     MET correction L inflare  L ant innom  R unilat ext sacrum 10'  12/12   MFR/ positional release  L piriformis 4'                              Modalities: consider DN    Pt. Education:  12/5/2022  -patient educated on diagnosis, prognosis and expectations for rehab  -all patient questions were answered  - pt taught correct body mechanics to protect her LB, reviewed her HEP and she is to continue it    Home Exercise Program:  12/5: pirfiormis stretching, fig 4 stretching, SKTC       Therapeutic Exercise and NMR EXR  [x] (17333) Provided verbal/tactile cueing for activities related to strengthening, flexibility, endurance, ROM for improvements in  [x] LE / Lumbar: LE, proximal hip, and core control with self care, mobility, lifting, ambulation. [] UE / Cervical: cervical, postural, scapular, scapulothoracic and UE control with self care, reaching, carrying, lifting, house/yardwork, driving, computer work.  [] (08591) Provided verbal/tactile cueing for activities related to improving balance, coordination, kinesthetic sense, posture, motor skill, proprioception to assist with   [] LE / lumbar: LE, proximal hip, and core control in self care, mobility, lifting, ambulation and eccentric single leg control. [] UE / cervical: cervical, scapular, scapulothoracic and UE control with self care, reaching, carrying, lifting, house/yardwork, driving, computer work.   [] (55798) Therapist is in constant attendance of 2 or more patients providing skilled therapy interventions, but not providing any significant amount of measurable one-on-one time to either patient, for improvements in  [] LE / lumbar: LE, proximal hip, and core control in self care, mobility, lifting, ambulation and eccentric single leg control.    [] UE / cervical: cervical, scapular, scapulothoracic and UE control with self care, reaching, carrying, lifting, house/yardwork, driving, computer work. NMR and Therapeutic Activities:    [] (37226 or 19872) Provided verbal/tactile cueing for activities related to improving balance, coordination, kinesthetic sense, posture, motor skill, proprioception and motor activation to allow for proper function of   [] LE: / Lumbar core, proximal hip and LE with self care and ADLs  [] UE / Cervical: cervical, postural, scapular, scapulothoracic and UE control with self care, carrying, lifting, driving, computer work.   [] (96648) Gait Re-education- Provided training and instruction to the patient for proper LE, core and proximal hip recruitment and positioning and eccentric body weight control with ambulation re-education including up and down stairs     Home Management Training / Self Care:  [] (86402) Provided self-care/home management training related to activities of daily living and compensatory training, and/or use of adaptive equipment for improvement with: ADLs and compensatory training, meal preparation, safety procedures and instruction in use of adaptive equipment, including bathing, grooming, dressing, personal hygiene, basic household cleaning and chores.      Home Exercise Program:    [x] (74241) Reviewed/Progressed HEP activities related to strengthening, flexibility, endurance, ROM of   [] LE / Lumbar: core, proximal hip and LE for functional self-care, mobility, lifting and ambulation/stair navigation   [] UE / Cervical: cervical, postural, scapular, scapulothoracic and UE control with self care, reaching, carrying, lifting, house/yardwork, driving, computer work  [] (61066)Reviewed/Progressed HEP activities related to improving balance, coordination, kinesthetic sense, posture, motor skill, proprioception of   [] LE: core, proximal hip and LE for self care, mobility, lifting, and ambulation/stair navigation    [] UE / Cervical: cervical, postural,  scapular, scapulothoracic and UE control with self care, reaching, carrying, lifting, house/yardwork, driving, computer work    Manual Treatments:  PROM / STM / Oscillations-Mobs:  G-I, II, III, IV (PA's, Inf., Post.)  [x] (58184) Provided manual therapy to mobilize LE, proximal hip and/or LS spine soft tissue/joints for the purpose of modulating pain, promoting relaxation,  increasing ROM, reducing/eliminating soft tissue swelling/inflammation/restriction, improving soft tissue extensibility and allowing for proper ROM for normal function with   [x] LE / lumbar: self care, mobility, lifting and ambulation. [] UE / Cervical: self care, reaching, carrying, lifting, house/yardwork, driving, computer work. Modalities:  [] (76981) Vasopneumatic compression: Utilized vasopneumatic compression to decrease edema / swelling for the purpose of improving mobility and quad tone / recruitment which will allow for increased overall function including but not limited to self-care, transfers, ambulation, and ascending / descending stairs. Charges:  Timed Code Treatment Minutes: 45   Total Treatment Minutes: 45     [] EVAL - LOW (38210)   [] EVAL - MOD (08509)  [] EVAL - HIGH (54516)  [] RE-EVAL (34484)  [x] EF(42688) x   2    [] Ionto  [] NMR (08939) x       [] Vaso  [x] Manual (25473) x 1    [] Ultrasound  [] TA x        [] Mech Traction (73497)  [] Aquatic Therapy x     [] ES (un) (14645):   [] Home Management Training x  [] ES(attended) (59498)   [] Dry Needling 1-2 muscles (63701):  [] Dry Needling 3+ muscles (096170)  [] Group:      [] Other:     GOALS:   Patient stated goal: walking and exercising with no pain  [] Progressing: [] Met: [] Not Met: [] Adjusted     Therapist goals for Patient:   Short Term Goals: To be achieved in: 2 weeks  1. Independent in HEP and progression per patient tolerance, in order to prevent re-injury. [] Progressing: [] Met: [] Not Met: [] Adjusted  2.  Patient will have a decrease in pain to facilitate improvement in movement, function, and ADLs as indicated by improvement with respect to Functional Deficits. [] Progressing: [] Met: [] Not Met: [] Adjusted     Long Term Goals: To be achieved in: 6 weeks  1. FOTO functional survey score of >/= 61  to assist with reaching prior level of function. [] Progressing: [] Met: [] Not Met: [] Adjusted  2. Patient will demonstrate increased AROM  to [facility], to allow for proper joint functioning to allow pt to resume walking  without increase in symptoms. [] Progressing: [] Met: [] Not Met: [] Adjusted  3. Patient will demonstrate increased Strength and core activation to allow for proper functional mobility as indicated by patients Functional Deficits to allow pt to resume exercising without increase in symptoms. [] Progressing: [] Met: [] Not Met: [] Adjusted  4. Patient will return to functional activities including driving to work without increased symptoms or restriction. [] Progressing: [] Met: [] Not Met: [] Adjusted    Overall Progression Towards Functional goals/ Treatment Progress Update:  [] Patient is progressing as expected towards functional goals listed. [] Progression is slowed due to complexities/Impairments listed. [] Progression has been slowed due to co-morbidities. [x] Plan just implemented, too soon to assess goals progression <30days   [] Goals require adjustment due to lack of progress  [] Patient is not progressing as expected and requires additional follow up with physician  [] Other    Persisting Functional Limitations/Impairments:  [x]Sleeping []Sitting               [x]Standing []Transfers        [x]Walking []Kneeling               [x]Stairs [x]Squatting / bending   [x]ADLs [x]Reaching  [x]Lifting  []Housework  [x]Driving []Job related tasks  []Sports/Recreation []Other:        ASSESSMENT:  Pt tolerated treatment session well. She has sig myofascial tightness in the L-glut and also the L-calf/ITB.  We added stretches to include gastroc and sciatic nerve glides to her HEP to address this. Also did some MET to address mal-alignment of the pelvis and SIJs which may be contributing to the nerve irritation. Pt has sig TP's along the post chain and glut. She might benefit from dry needling if MD ok's. Treatment/Activity Tolerance:  [x] Patient able to complete tx [] Patient limited by fatigue  [] Patient limited by pain  [] Patient limited by other medical complications  [] Other:     Prognosis: [x] Good [] Fair  [] Poor    Patient Requires Follow-up: [x] Yes  [] No    Plan for next treatment session: continue addressing the myofascial tightness. Check for spinal and SIJ alignment and motion in flexion. Request to include DN from referring MD    PLAN: See miriam. PT 2x / week for 6 weeks. [] Continue per plan of care [] Alter current plan (see comments)  [x] Plan of care initiated [] Hold pending MD visit [] Discharge    Electronically signed by: Fatmata Rubio, PT , DPT    Note: If patient does not return for scheduled/ recommended follow up visits, this note will serve as a discharge from care along with most recent update on progress.

## 2022-12-19 ENCOUNTER — HOSPITAL ENCOUNTER (OUTPATIENT)
Dept: PHYSICAL THERAPY | Age: 52
Setting detail: THERAPIES SERIES
Discharge: HOME | End: 2022-12-19
Payer: COMMERCIAL

## 2022-12-19 PROCEDURE — 97140 MANUAL THERAPY 1/> REGIONS: CPT

## 2022-12-19 PROCEDURE — 97110 THERAPEUTIC EXERCISES: CPT

## 2022-12-19 NOTE — FLOWSHEET NOTE
[address] [facility] Physical Therapy  Phone: [phone]   Fax: [phone]    Physical Therapy Daily Treatment Note    Date:  2022     Patient Name:  Alena Vang    :  1970  MRN: [id number]  Medical Diagnosis:  Piriformis syndrome, left [G57.02]  Treatment Diagnosis: sacrum dysfunction  Insurance/Certification information:  PT Insurance Information: anthem  Physician Information:  Severo Minium, [address] [place] [address] signed (Y/N): []  Yes [x]  No     Date of Patient follow up with Physician:      Progress Report: []  Yes  [x]  No     Date Range for reporting period:  Beginnin2022  Ending:     Progress report due (10 Rx/or 30 days whichever is less): visit #10 or 17      Recertification due (POC duration/ or 90 days whichever is less): visit #12 or 3/5/23     Visit # Insurance Allowable Auth required? Date Range   3/ 12 40 []  Yes  []  No            Latex Allergy:  [x]NO      []YES  Preferred Language for Healthcare:   [x]English       []other:    Functional Scale:           Date assessed:  TO physical FS primary measure score = 38; risk adjusted = 57  22    Pain level:  5/10 L glut/ LLE     SUBJECTIVE: Pt reports she was really sore the day after last session but it eased up and she felt better mid week with less radiating symptoms down LLE. 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 OBJECTIVE:  : MMT prone hip ext: L=4-, R=4+ .  Pt also has severe tightness in L gastroc      RESTRICTIONS/PRECAUTIONS:     Exercises/Interventions:     Therapeutic Exercises (65149) x 30' Resistance / level Sets/time Reps Notes   Recumbent bike L3 5'     IB calf stretch  HR-TR  30\"  1 2  12    HSS on step w/ rotation  30\" 2 L/R    HFS on step  30\" 2 L/R    Sciatic nerve glides supine  3 10 L    TA + DKTC feet on SB  1 10    TA + LTR feet on SB  1 10 LR    Piriformis stretch w/ foot on SB  5-10\" H 6 L/R    Bridge  1 10 Therapeutic Activities (73804)                                                                      Neuromuscular Re-ed (75492)                                                 Manual Intervention ([id number]) x 25'       STM  Roller stick to L-glut and posterior LLE 6'     MET correction L inflare  Sig ALLEY torsion  R unilat ext sacrum   13'     MFR/ positional release  L piriformis 4'     LAD LLE 20\" 4                      Modalities: consider DN (12/19 gave pt consent form to read later and sign & explanation of the procedure)    Pt. Education:  12/5/2022  -patient educated on diagnosis, prognosis and expectations for rehab  -all patient questions were answered  - pt taught correct body mechanics to protect her LB, reviewed her HEP and she is to continue it    Home Exercise Program:  12/5: pirfiormis stretching, fig 4 stretching, SKTC       Therapeutic Exercise and NMR EXR  [x] (33275) Provided verbal/tactile cueing for activities related to strengthening, flexibility, endurance, ROM for improvements in  [x] LE / Lumbar: LE, proximal hip, and core control with self care, mobility, lifting, ambulation. [] UE / Cervical: cervical, postural, scapular, scapulothoracic and UE control with self care, reaching, carrying, lifting, house/yardwork, driving, computer work.  [] (31612) Provided verbal/tactile cueing for activities related to improving balance, coordination, kinesthetic sense, posture, motor skill, proprioception to assist with   [] LE / lumbar: LE, proximal hip, and core control in self care, mobility, lifting, ambulation and eccentric single leg control.    [] UE / cervical: cervical, scapular, scapulothoracic and UE control with self care, reaching, carrying, lifting, house/yardwork, driving, computer work.   [] (77289) Therapist is in constant attendance of 2 or more patients providing skilled therapy interventions, but not providing any significant amount of measurable one-on-one time to either patient, for improvements in  [] LE / lumbar: LE, proximal hip, and core control in self care, mobility, lifting, ambulation and eccentric single leg control. [] UE / cervical: cervical, scapular, scapulothoracic and UE control with self care, reaching, carrying, lifting, house/yardwork, driving, computer work. NMR and Therapeutic Activities:    [] (21143 or 83706) Provided verbal/tactile cueing for activities related to improving balance, coordination, kinesthetic sense, posture, motor skill, proprioception and motor activation to allow for proper function of   [] LE: / Lumbar core, proximal hip and LE with self care and ADLs  [] UE / Cervical: cervical, postural, scapular, scapulothoracic and UE control with self care, carrying, lifting, driving, computer work.   [] (03086) Gait Re-education- Provided training and instruction to the patient for proper LE, core and proximal hip recruitment and positioning and eccentric body weight control with ambulation re-education including up and down stairs     Home Management Training / Self Care:  [] (65843) Provided self-care/home management training related to activities of daily living and compensatory training, and/or use of adaptive equipment for improvement with: ADLs and compensatory training, meal preparation, safety procedures and instruction in use of adaptive equipment, including bathing, grooming, dressing, personal hygiene, basic household cleaning and chores.      Home Exercise Program:    [] (69433) Reviewed/Progressed HEP activities related to strengthening, flexibility, endurance, ROM of   [] LE / Lumbar: core, proximal hip and LE for functional self-care, mobility, lifting and ambulation/stair navigation   [] UE / Cervical: cervical, postural, scapular, scapulothoracic and UE control with self care, reaching, carrying, lifting, house/yardwork, driving, computer work  [] (62506)Reviewed/Progressed HEP activities related to improving balance, coordination, kinesthetic sense, posture, motor skill, proprioception of   [] LE: core, proximal hip and LE for self care, mobility, lifting, and ambulation/stair navigation    [] UE / Cervical: cervical, postural,  scapular, scapulothoracic and UE control with self care, reaching, carrying, lifting, house/yardwork, driving, computer work    Manual Treatments:  PROM / STM / Oscillations-Mobs:  G-I, II, III, IV (PA's, Inf., Post.)  [x] (30773) Provided manual therapy to mobilize LE, proximal hip and/or LS spine soft tissue/joints for the purpose of modulating pain, promoting relaxation,  increasing ROM, reducing/eliminating soft tissue swelling/inflammation/restriction, improving soft tissue extensibility and allowing for proper ROM for normal function with   [x] LE / lumbar: self care, mobility, lifting and ambulation. [] UE / Cervical: self care, reaching, carrying, lifting, house/yardwork, driving, computer work. Modalities:  [] (79414) Vasopneumatic compression: Utilized vasopneumatic compression to decrease edema / swelling for the purpose of improving mobility and quad tone / recruitment which will allow for increased overall function including but not limited to self-care, transfers, ambulation, and ascending / descending stairs. Charges:  Timed Code Treatment Minutes: 55   Total Treatment Minutes: 55     [] EVAL - LOW (55616)   [] EVAL - MOD (37854)  [] EVAL - HIGH (35625)  [] RE-EVAL (25958)  [x] DQ(20780) x   2    [] Ionto  [] NMR (61424) x       [] Vaso  [x] Manual (12980) x 2    [] Ultrasound  [] TA x        [] Mech Traction (76598)  [] Aquatic Therapy x     [] ES (un) (45240):   [] Home Management Training x  [] ES(attended) (89245)   [] Dry Needling 1-2 muscles (75119):  [] Dry Needling 3+ muscles (423238)  [] Group:      [] Other:     GOALS:   Patient stated goal: walking and exercising with no pain  [] Progressing: [] Met: [] Not Met: [] Adjusted     Therapist goals for Patient:   Short Term Goals:  To be achieved in: 2 weeks  1. Independent in HEP and progression per patient tolerance, in order to prevent re-injury. [] Progressing: [] Met: [] Not Met: [] Adjusted  2. Patient will have a decrease in pain to facilitate improvement in movement, function, and ADLs as indicated by improvement with respect to Functional Deficits. [] Progressing: [] Met: [] Not Met: [] Adjusted     Long Term Goals: To be achieved in: 6 weeks  1. FOTO functional survey score of >/= 61  to assist with reaching prior level of function. [] Progressing: [] Met: [] Not Met: [] Adjusted  2. Patient will demonstrate increased AROM  to [facility], to allow for proper joint functioning to allow pt to resume walking  without increase in symptoms. [] Progressing: [] Met: [] Not Met: [] Adjusted  3. Patient will demonstrate increased Strength and core activation to allow for proper functional mobility as indicated by patients Functional Deficits to allow pt to resume exercising without increase in symptoms. [] Progressing: [] Met: [] Not Met: [] Adjusted  4. Patient will return to functional activities including driving to work without increased symptoms or restriction. [] Progressing: [] Met: [] Not Met: [] Adjusted    Overall Progression Towards Functional goals/ Treatment Progress Update:  [] Patient is progressing as expected towards functional goals listed. [] Progression is slowed due to complexities/Impairments listed. [] Progression has been slowed due to co-morbidities.   [x] Plan just implemented, too soon to assess goals progression <30days   [] Goals require adjustment due to lack of progress  [] Patient is not progressing as expected and requires additional follow up with physician  [] Other    Persisting Functional Limitations/Impairments:  [x]Sleeping []Sitting               [x]Standing []Transfers        [x]Walking []Kneeling               [x]Stairs [x]Squatting / bending   [x]ADLs [x]Reaching  [x]Lifting []Housework  [x]Driving []Job related tasks  []Sports/Recreation []Other:        ASSESSMENT:  Pt tolerated treatment session well. We added some beginning core/and other mobility exercises and she tolerated them fine. Corrected sig sacral torsion in extension with MET and pt tolerated extension and standing better afterward. Did STM with roller stick and pt had some very tight areas in lat HS, lat calf and in L glut. Pt was educated on DN and could benefit from trial of it next session    Treatment/Activity Tolerance:  [x] Patient able to complete tx [] Patient limited by fatigue  [] Patient limited by pain  [] Patient limited by other medical complications  [] Other:     Prognosis: [x] Good [] Fair  [] Poor    Patient Requires Follow-up: [x] Yes  [] No    Plan for next treatment session: continue addressing the myofascial tightness. Check for spinal and SIJ alignment and motion in flexion. MD sent Rx for DN-try DN next session  (pt to bring signed consent form)    PLAN: See eval. PT 2x / week for 6 weeks. [] Continue per plan of care [] Alter current plan (see comments)  [x] Plan of care initiated [] Hold pending MD visit [] Discharge    Electronically signed by: Fatmata Rubio, PT , DPT    Note: If patient does not return for scheduled/ recommended follow up visits, this note will serve as a discharge from care along with most recent update on progress.

## 2022-12-21 ENCOUNTER — HOSPITAL ENCOUNTER (OUTPATIENT)
Dept: PHYSICAL THERAPY | Age: 52
Setting detail: THERAPIES SERIES
Discharge: HOME | End: 2022-12-21
Payer: COMMERCIAL

## 2022-12-21 PROCEDURE — 97032 APPL MODALITY 1+ESTIM EA 15: CPT

## 2022-12-21 PROCEDURE — 97110 THERAPEUTIC EXERCISES: CPT

## 2022-12-21 PROCEDURE — 20561 NDL INSJ W/O NJX 3+ MUSC: CPT

## 2022-12-21 NOTE — FLOWSHEET NOTE
[address] [facility] Therapy  Phone: [phone]   Fax: [phone]    Physical Therapy Daily Treatment Note    Date:  2022     Patient Name:  Chelly Calixto    :  1970  MRN: [id number]  Medical Diagnosis:  Piriformis syndrome, left [G57.02]  Treatment Diagnosis: sacrum dysfunction  Insurance/Certification information:  PT Insurance Information: anthem  Physician Information:  Lo Sheldon, [facility] [address] signed (Y/N): []  Yes [x]  No     Date of Patient follow up with Physician:      Progress Report: []  Yes  [x]  No     Date Range for reporting period:  Beginnin2022  Ending:     Progress report due (10 Rx/or 30 days whichever is less):       Recertification due (POC duration/ or 90 days whichever is less): 3/5/23     Visit # Insurance Allowable Auth required? Date Range    40 []  Yes  []  No            Latex Allergy:  [x]NO      []YES  Preferred Language for Healthcare:   [x]English       []other:    Functional Scale:           Date assessed:  [facility] physical FS primary measure score = 38; risk adjusted = 57  22    Pain level:  5/10 L glut/ LLE     SUBJECTIVE: Pt reports she continues to have the same level of pain in the glut that radiates down to the L ankle. Sitting for a 1/2 hour drive made it feel worse. She brought in the consent form for DN and wants to try it today. She arrived 10 min late for appt today    OBJECTIVE:  : MMT prone hip ext: L=4-, R=4+ .  Pt also has severe tightness in L gastroc  : Sig TP's in R glut med/ R glut min      RESTRICTIONS/PRECAUTIONS:     Exercises/Interventions:     Therapeutic Exercises (36856) x 20' Resistance / level Sets/time Reps Notes   Recumbent bike L3 5'     IB calf stretch  HR-TR  30\"  1 2  12    HSS on step w/ rotation  30\" 2 L/R    HFS on step  30\" 2 L/R    Sciatic nerve glides supine  3 10 L  added to HEP and reviewed ways to do this in sitting    TA + DKTC feet on SB     TA + LTR feet on SB     Piriformis stretch w/ foot on SB     Bridge                   Therapeutic Activities (10030)                                                                      Neuromuscular Re-ed (10680)                                                 Manual Intervention ([id number]) x        STM     MET correction    MFR/ positional release     LAD                      Modalities: consider DN (Pt has read and signed acknowledgement)    . 12/21/22: Dry needling manual therapy (18'): consisted on the placement of 6 needles in the following muscles:  L-upper glut max, L glut med x 2, L-glut min, B L4-S1 multifidi  A  mm needle was inserted, piston, rotated, and coned to produce intramuscular mobilization. These techniques were used to restore functional range of motion, reduce muscle spasm and induce healing in the corresponding musculature. (99285)  Clean Technique was utilized today while applying Dry needling treatment. The treatment sites where cleaned with 70% solution of  isopropyl alcohol . The PT washed their hands and utilized treatment gloves along with hand  prior to inserting the needles. All needles where removed and discarded in the appropriate sharps container. MD has given verbal and/or written approval for this treatment. Attended low frequency (1-20Hz) electrical stimulation was utilized in conjunction with Dry Needling:  the Estim was manipulated between L4 & S1 multifidi  needles for a period of 10 min. at 2-3 volts. The low frequency electrical stimulation was used to help reduce muscle spasm and help to interrupt /[place] the pain cycle. (88476)      Pt.  Education:  12/5/2022  -patient educated on diagnosis, prognosis and expectations for rehab  -all patient questions were answered  - pt taught correct body mechanics to protect her LB, reviewed her HEP and she is to continue it    Home Exercise Program:  12/5: pirfiormis stretching, fig 4 stretching, SKTC 12/21: instructed in supine/seated sciatic nerve glides. Therapeutic Exercise and NMR EXR  [x] (03862) Provided verbal/tactile cueing for activities related to strengthening, flexibility, endurance, ROM for improvements in  [x] LE / Lumbar: LE, proximal hip, and core control with self care, mobility, lifting, ambulation. [] UE / Cervical: cervical, postural, scapular, scapulothoracic and UE control with self care, reaching, carrying, lifting, house/yardwork, driving, computer work.  [] (70376) Provided verbal/tactile cueing for activities related to improving balance, coordination, kinesthetic sense, posture, motor skill, proprioception to assist with   [] LE / lumbar: LE, proximal hip, and core control in self care, mobility, lifting, ambulation and eccentric single leg control. [] UE / cervical: cervical, scapular, scapulothoracic and UE control with self care, reaching, carrying, lifting, house/yardwork, driving, computer work.   [] (90044) Therapist is in constant attendance of 2 or more patients providing skilled therapy interventions, but not providing any significant amount of measurable one-on-one time to either patient, for improvements in  [] LE / lumbar: LE, proximal hip, and core control in self care, mobility, lifting, ambulation and eccentric single leg control. [] UE / cervical: cervical, scapular, scapulothoracic and UE control with self care, reaching, carrying, lifting, house/yardwork, driving, computer work.      NMR and Therapeutic Activities:    [] (97313 or 15517) Provided verbal/tactile cueing for activities related to improving balance, coordination, kinesthetic sense, posture, motor skill, proprioception and motor activation to allow for proper function of   [] LE: / Lumbar core, proximal hip and LE with self care and ADLs  [] UE / Cervical: cervical, postural, scapular, scapulothoracic and UE control with self care, carrying, lifting, driving, computer work.   [] (25371) Gait Re-education- Provided training and instruction to the patient for proper LE, core and proximal hip recruitment and positioning and eccentric body weight control with ambulation re-education including up and down stairs     Home Management Training / Self Care:  [] (78649) Provided self-care/home management training related to activities of daily living and compensatory training, and/or use of adaptive equipment for improvement with: ADLs and compensatory training, meal preparation, safety procedures and instruction in use of adaptive equipment, including bathing, grooming, dressing, personal hygiene, basic household cleaning and chores.      Home Exercise Program:    [] (04704) Reviewed/Progressed HEP activities related to strengthening, flexibility, endurance, ROM of   [x] LE / Lumbar: core, proximal hip and LE for functional self-care, mobility, lifting and ambulation/stair navigation   [] UE / Cervical: cervical, postural, scapular, scapulothoracic and UE control with self care, reaching, carrying, lifting, house/yardwork, driving, computer work  [] (52792)Reviewed/Progressed HEP activities related to improving balance, coordination, kinesthetic sense, posture, motor skill, proprioception of   [] LE: core, proximal hip and LE for self care, mobility, lifting, and ambulation/stair navigation    [] UE / Cervical: cervical, postural,  scapular, scapulothoracic and UE control with self care, reaching, carrying, lifting, house/yardwork, driving, computer work    Manual Treatments:  PROM / STM / Oscillations-Mobs:  G-I, II, III, IV (PA's, Inf., Post.)  [] (72252) Provided manual therapy to mobilize LE, proximal hip and/or LS spine soft tissue/joints for the purpose of modulating pain, promoting relaxation,  increasing ROM, reducing/eliminating soft tissue swelling/inflammation/restriction, improving soft tissue extensibility and allowing for proper ROM for normal function with   [] LE / lumbar: self care, mobility, lifting and ambulation. [] UE / Cervical: self care, reaching, carrying, lifting, house/yardwork, driving, computer work. Modalities:  [] (08729) Vasopneumatic compression: Utilized vasopneumatic compression to decrease edema / swelling for the purpose of improving mobility and quad tone / recruitment which will allow for increased overall function including but not limited to self-care, transfers, ambulation, and ascending / descending stairs. Charges:  Timed Code Treatment Minutes: 20   Total Treatment Minutes: 48     [] EVAL - LOW (12042)   [] EVAL - MOD (06585)  [] EVAL - HIGH (88676)  [] RE-EVAL (79987)  [x] HB(15361) x   1    [] Ionto  [] NMR (04376) x       [] Vaso  [] Manual (05486) x    [] Ultrasound  [] TA x        [] Mech Traction (44723)  [] Aquatic Therapy x     [] ES (un) (26542):   [] Home Management Training x  [x] ES(attended) (53908)   [] Dry Needling 1-2 muscles (77683):  [x] Dry Needling 3+ muscles (17324)  [] Group:      [] Other:     GOALS:   Patient stated goal: walking and exercising with no pain  [] Progressing: [] Met: [] Not Met: [] Adjusted     Therapist goals for Patient:   Short Term Goals: To be achieved in: 2 weeks  1. Independent in HEP and progression per patient tolerance, in order to prevent re-injury. [] Progressing: [] Met: [] Not Met: [] Adjusted  2. Patient will have a decrease in pain to facilitate improvement in movement, function, and ADLs as indicated by improvement with respect to Functional Deficits. [] Progressing: [] Met: [] Not Met: [] Adjusted     Long Term Goals: To be achieved in: 6 weeks  1. FOTO functional survey score of >/= 61  to assist with reaching prior level of function. [] Progressing: [] Met: [] Not Met: [] Adjusted  2. Patient will demonstrate increased AROM  to [facility], to allow for proper joint functioning to allow pt to resume walking  without increase in symptoms. [] Progressing: [] Met: [] Not Met: [] Adjusted  3.  Patient will demonstrate increased Strength and core activation to allow for proper functional mobility as indicated by patients Functional Deficits to allow pt to resume exercising without increase in symptoms. [] Progressing: [] Met: [] Not Met: [] Adjusted  4. Patient will return to functional activities including driving to work without increased symptoms or restriction. [] Progressing: [] Met: [] Not Met: [] Adjusted    Overall Progression Towards Functional goals/ Treatment Progress Update:  [] Patient is progressing as expected towards functional goals listed. [] Progression is slowed due to complexities/Impairments listed. [] Progression has been slowed due to co-morbidities. [x] Plan just implemented, too soon to assess goals progression <30days   [] Goals require adjustment due to lack of progress  [] Patient is not progressing as expected and requires additional follow up with physician  [] Other    Persisting Functional Limitations/Impairments:  [x]Sleeping []Sitting               [x]Standing []Transfers        [x]Walking []Kneeling               [x]Stairs [x]Squatting / bending   [x]ADLs [x]Reaching  [x]Lifting  []Housework  [x]Driving []Job related tasks  []Sports/Recreation []Other:        ASSESSMENT:  Pt tolerated treatment session well. Session focused on addressing the significant myofascial tightness with stretches and TP's with a trial of dry needling and attended E-stim. Pt had some significant TP's at  L glut med and glut min. She tolerated the DN pretty well. Pt will be doing a long drive traveling for the holidays. Reviewed importance of stretching and nerve glides when they take driving breaks. Continue PT.     Treatment/Activity Tolerance:  [x] Patient able to complete tx [] Patient limited by fatigue  [] Patient limited by pain  [] Patient limited by other medical complications  [] Other:     Prognosis: [x] Good [] Fair  [] Poor    Patient Requires Follow-up: [x] Yes  [] No    Plan for next treatment session: continue addressing the myofascial tightness. Check for spinal and SIJ alignment and motion in flexion. Continue DN. PROG NOTE NEXT SESSION    PLAN: See eval. PT 2x / week for 6 weeks. [] Continue per plan of care [] Alter current plan (see comments)  [x] Plan of care initiated [] Hold pending MD visit [] Discharge    Electronically signed by: Fatmata Rubio, PT , DPT    Note: If patient does not return for scheduled/ recommended follow up visits, this note will serve as a discharge from care along with most recent update on progress.

## 2022-12-27 NOTE — TELEPHONE ENCOUNTER
ROBERT Noriega.  You 3 minutes ago (12:03 PM)    This is fine. Just let her know this may be more expensive. Can she not do home INR readings for warfarin and refills in [place]? [place]        Travelling the end part of next week, thinks a medrol dospack would help if she could start it beginning of the week as it did help for a good chunk when taking it, but once it started to wear off experienced the same pain. Would like referral to Dr Laly Duron office, and will finish appeal tomorrow morning.      Please review and advise

## 2023-01-03 ENCOUNTER — HOSPITAL ENCOUNTER (OUTPATIENT)
Dept: PHYSICAL THERAPY | Age: 53
Setting detail: THERAPIES SERIES
Discharge: HOME | End: 2023-01-03
Payer: COMMERCIAL

## 2023-01-03 PROCEDURE — 20561 NDL INSJ W/O NJX 3+ MUSC: CPT

## 2023-01-03 PROCEDURE — 97110 THERAPEUTIC EXERCISES: CPT

## 2023-01-03 PROCEDURE — 97032 APPL MODALITY 1+ESTIM EA 15: CPT

## 2023-01-03 PROCEDURE — 97140 MANUAL THERAPY 1/> REGIONS: CPT

## 2023-01-03 NOTE — FLOWSHEET NOTE
[address] [facility]  Phone: [phone]   Fax: [phone]    Physical Therapy Daily Treatment Note    Date:  2023     Patient Name:  Asif Rivas    :  1970  MRN: [id number]  Medical Diagnosis:  Piriformis syndrome, left [G57.02]  Treatment Diagnosis: sacrum dysfunction  Insurance/Certification information:  PT Insurance Information: anthem  Physician Information:  Yoselin Escalera, [facility] [address] signed (Y/N): []  Yes [x]  No     Date of Patient follow up with Physician:      Progress Report: []  Yes  [x]  No     Date Range for reporting period:  Beginnin2022  Ending:     Progress report due (10 Rx/or 30 days whichever is less): 73      Recertification due (POC duration/ or 90 days whichever is less): 3/5/23     Visit # Insurance Allowable Auth required? Date Range    40 []  Yes  []  No            Latex Allergy:  [x]NO      []YES  Preferred Language for Healthcare:   [x]English       []other:    Functional Scale:           Date assessed:  TO physical FS primary measure score = 38; risk adjusted = 57  22    Pain level:  5/10 L glut/ LLE     SUBJECTIVE: Pt reports she continues to have the same level of pain in the glut that radiates down to the L anteromedial ankle. Patient reports she did not have any pain from dry needling/ESTIM from last session. Did travel over the holiday in car to [facility] and painful walking down to beach. OBJECTIVE:    1/3: U6IDVOL/ERSL, L4FRSL, ALLEY sacral, L SI anterior   : MMT prone hip ext: L=4-, R=4+ .  Pt also has severe tightness in L gastroc  : Sig TP's in R glut med/ R glut min      RESTRICTIONS/PRECAUTIONS:     Exercises/Interventions:     Therapeutic Exercises (05273) x 20' Resistance / level Sets/time Reps Notes   Recumbent bike L3 5'     IB calf stretch  HR-TR  30\"  1 2  12    HSS on step w/ rotation  30\" 2 L/R    HFS on step  30\" 2 L/R    Sciatic nerve glides supine  3 10 L 12/21 added to HEP and reviewed ways to do this in sitting    TA + DKTC feet on SB     TA + LTR feet on SB     Piriformis stretch w/ foot on SB     Bridge                   Therapeutic Activities (69771)                                                                      Neuromuscular Re-ed (99668)                                                 Manual Intervention (31305) x 8'       STM     MET correction L SI anterior innominate SI fault    L5 NRLSR/ERSL     MFR/ positional release     LAD    Manual  stretches B piriformis  And HS 20\"  3 L/R each               Modalities: consider DN (Pt has read and signed acknowledgement)    .12/21/22: Dry needling manual therapy (18'): consisted on the placement of 6 needles in the following muscles:  L-upper glut max, L glut med x 2, L-glut min, B L4-S1 multifidi  A  mm needle was inserted, piston, rotated, and coned to produce intramuscular mobilization.  These techniques were used to restore functional range of motion, reduce muscle spasm and induce healing in the corresponding musculature. (28907)  Clean Technique was utilized today while applying Dry needling treatment.  The treatment sites where cleaned with 70% solution of  isopropyl alcohol .  The PT washed their hands and utilized treatment gloves along with hand  prior to inserting the needles.  All needles where removed and discarded in the appropriate sharps container.  MD has given verbal and/or written approval for this treatment.     Attended low frequency (1-20Hz) electrical stimulation was utilized in conjunction with Dry Needling:  the Estim was manipulated between L4 & S1 multifidi  needles for a period of 10 min.  at 2-3 volts.  The low frequency electrical stimulation was used to help reduce muscle spasm and help to interrupt /[place] the pain cycle. (21047)      01/03/2023 Dry needling manual therapy (15'): consisted on the placement of 6 needles in the following muscles: , B L4-S1  multifidi  and B medial and lateral piriformis A  mm needle was inserted, piston, rotated, and coned to produce intramuscular mobilization. These techniques were used to restore functional range of motion, reduce muscle spasm and induce healing in the corresponding musculature. (73866)  Clean Technique was utilized today while applying Dry needling treatment. The treatment sites where cleaned with 70% solution of  isopropyl alcohol . The PT washed their hands and utilized treatment gloves along with hand  prior to inserting the needles. All needles where removed and discarded in the appropriate sharps container. MD has given verbal and/or written approval for this treatment. Attended low frequency (1-20Hz) electrical stimulation was utilized in conjunction with Dry Needling:  the Estim was manipulated between L4 & S1 multifidi  needles for a period of 10 min. at 2-3 volts. The low frequency electrical stimulation was used to help reduce muscle spasm and help to interrupt /[place] the pain cycle. (18184)    Pt. Education:  12/5/2022  -patient educated on diagnosis, prognosis and expectations for rehab  -all patient questions were answered  - pt taught correct body mechanics to protect her LB, reviewed her HEP and she is to continue it    Home Exercise Program:  12/5: pirfiormis stretching, fig 4 stretching, SKTC   12/21: instructed in supine/seated sciatic nerve glides. Therapeutic Exercise and NMR EXR  [x] (44896) Provided verbal/tactile cueing for activities related to strengthening, flexibility, endurance, ROM for improvements in  [x] LE / Lumbar: LE, proximal hip, and core control with self care, mobility, lifting, ambulation.   [] UE / Cervical: cervical, postural, scapular, scapulothoracic and UE control with self care, reaching, carrying, lifting, house/yardwork, driving, computer work.  [] (66537) Provided verbal/tactile cueing for activities related to improving balance, coordination, kinesthetic sense, posture, motor skill, proprioception to assist with   [] LE / lumbar: LE, proximal hip, and core control in self care, mobility, lifting, ambulation and eccentric single leg control. [] UE / cervical: cervical, scapular, scapulothoracic and UE control with self care, reaching, carrying, lifting, house/yardwork, driving, computer work.   [] (98901) Therapist is in constant attendance of 2 or more patients providing skilled therapy interventions, but not providing any significant amount of measurable one-on-one time to either patient, for improvements in  [] LE / lumbar: LE, proximal hip, and core control in self care, mobility, lifting, ambulation and eccentric single leg control. [] UE / cervical: cervical, scapular, scapulothoracic and UE control with self care, reaching, carrying, lifting, house/yardwork, driving, computer work.      NMR and Therapeutic Activities:    [] (58643 or 62958) Provided verbal/tactile cueing for activities related to improving balance, coordination, kinesthetic sense, posture, motor skill, proprioception and motor activation to allow for proper function of   [] LE: / Lumbar core, proximal hip and LE with self care and ADLs  [] UE / Cervical: cervical, postural, scapular, scapulothoracic and UE control with self care, carrying, lifting, driving, computer work.   [] (70305) Gait Re-education- Provided training and instruction to the patient for proper LE, core and proximal hip recruitment and positioning and eccentric body weight control with ambulation re-education including up and down stairs     Home Management Training / Self Care:  [] (38102) Provided self-care/home management training related to activities of daily living and compensatory training, and/or use of adaptive equipment for improvement with: ADLs and compensatory training, meal preparation, safety procedures and instruction in use of adaptive equipment, including bathing, grooming, dressing, personal hygiene, basic household cleaning and chores. Home Exercise Program:    [] (22033) Reviewed/Progressed HEP activities related to strengthening, flexibility, endurance, ROM of   [x] LE / Lumbar: core, proximal hip and LE for functional self-care, mobility, lifting and ambulation/stair navigation   [] UE / Cervical: cervical, postural, scapular, scapulothoracic and UE control with self care, reaching, carrying, lifting, house/yardwork, driving, computer work  [] (86932)Reviewed/Progressed HEP activities related to improving balance, coordination, kinesthetic sense, posture, motor skill, proprioception of   [] LE: core, proximal hip and LE for self care, mobility, lifting, and ambulation/stair navigation    [] UE / Cervical: cervical, postural,  scapular, scapulothoracic and UE control with self care, reaching, carrying, lifting, house/yardwork, driving, computer work    Manual Treatments:  PROM / STM / Oscillations-Mobs:  G-I, II, III, IV (PA's, Inf., Post.)  [] (50916) Provided manual therapy to mobilize LE, proximal hip and/or LS spine soft tissue/joints for the purpose of modulating pain, promoting relaxation,  increasing ROM, reducing/eliminating soft tissue swelling/inflammation/restriction, improving soft tissue extensibility and allowing for proper ROM for normal function with   [] LE / lumbar: self care, mobility, lifting and ambulation. [] UE / Cervical: self care, reaching, carrying, lifting, house/yardwork, driving, computer work. Modalities:  [] (46570) Vasopneumatic compression: Utilized vasopneumatic compression to decrease edema / swelling for the purpose of improving mobility and quad tone / recruitment which will allow for increased overall function including but not limited to self-care, transfers, ambulation, and ascending / descending stairs.        Charges:  Timed Code Treatment Minutes: 20   Total Treatment Minutes: 45     [] EVAL - LOW (88708)   [] EVAL - MOD (29356)  [] EVAL - HIGH (55259)  [] RE-EVAL (18454)  [] XA(50006) x   1    [] Ionto  [] NMR (75460) x       [] Vaso  [x] Manual (39369) x  1  [] Ultrasound  [] TA x        [] Mech Traction (91623)  [] Aquatic Therapy x     [] ES (un) (87577):   [] Home Management Training x  [x] ES(attended) (51399)   [] Dry Needling 1-2 muscles (50036):  [x] Dry Needling 3+ muscles (50467)  [] Group:      [] Other:     GOALS:   Patient stated goal: walking and exercising with no pain  [] Progressing: [] Met: [] Not Met: [] Adjusted     Therapist goals for Patient:   Short Term Goals: To be achieved in: 2 weeks  1. Independent in HEP and progression per patient tolerance, in order to prevent re-injury. [] Progressing: [] Met: [] Not Met: [] Adjusted  2. Patient will have a decrease in pain to facilitate improvement in movement, function, and ADLs as indicated by improvement with respect to Functional Deficits. [] Progressing: [] Met: [] Not Met: [] Adjusted     Long Term Goals: To be achieved in: 6 weeks  1. FOTO functional survey score of >/= 61  to assist with reaching prior level of function. [] Progressing: [] Met: [] Not Met: [] Adjusted  2. Patient will demonstrate increased AROM  to [facility], to allow for proper joint functioning to allow pt to resume walking  without increase in symptoms. [] Progressing: [] Met: [] Not Met: [] Adjusted  3. Patient will demonstrate increased Strength and core activation to allow for proper functional mobility as indicated by patients Functional Deficits to allow pt to resume exercising without increase in symptoms. [] Progressing: [] Met: [] Not Met: [] Adjusted  4. Patient will return to functional activities including driving to work without increased symptoms or restriction. [] Progressing: [] Met: [] Not Met: [] Adjusted    Overall Progression Towards Functional goals/ Treatment Progress Update:  [x] Patient is progressing as expected towards functional goals listed.     [] Progression is slowed due to complexities/Impairments listed. [] Progression has been slowed due to co-morbidities. [] Plan just implemented, too soon to assess goals progression <30days   [] Goals require adjustment due to lack of progress  [] Patient is not progressing as expected and requires additional follow up with physician  [] Other    Persisting Functional Limitations/Impairments:  [x]Sleeping []Sitting               [x]Standing []Transfers        [x]Walking []Kneeling               [x]Stairs [x]Squatting / bending   [x]ADLs [x]Reaching  [x]Lifting  []Housework  [x]Driving []Job related tasks  []Sports/Recreation []Other:        ASSESSMENT:  Pt tolerated treatment session well. Session focused on addressing the significant myofascial tightness with stretches and TP's with continued f dry needling and attended E-stim with addition of B piriformis. Pt had some significant TP's in L greater than R piriformis. She tolerated the DN pretty well. Address remaining positional faults and add in core level strengthening in next few sessions. Treatment/Activity Tolerance:  [x] Patient able to complete tx [] Patient limited by fatigue  [] Patient limited by pain  [] Patient limited by other medical complications  [] Other:     Prognosis: [x] Good [] Fair  [] Poor    Patient Requires Follow-up: [x] Yes  [] No    Plan for next treatment session: continue addressing the myofascial tightness. Check for spinal and SIJ alignment and motion in flexion. Continue DN. PROG NOTE NEXT SESSION    PLAN: See eval. PT 2x / week for 6 weeks. [] Continue per plan of care [] Alter current plan (see comments)  [x] Plan of care initiated [] Hold pending MD visit [] Discharge    Electronically signed by: Fina Mccain, PT , DPT    Note: If patient does not return for scheduled/ recommended follow up visits, this note will serve as a discharge from care along with most recent update on progress.

## 2023-01-06 ENCOUNTER — HOSPITAL ENCOUNTER (OUTPATIENT)
Dept: PHYSICAL THERAPY | Age: 53
Setting detail: THERAPIES SERIES
Discharge: HOME | End: 2023-01-06
Payer: COMMERCIAL

## 2023-01-06 PROCEDURE — 97530 THERAPEUTIC ACTIVITIES: CPT

## 2023-01-06 PROCEDURE — 97140 MANUAL THERAPY 1/> REGIONS: CPT

## 2023-01-06 PROCEDURE — 20560 NDL INSJ W/O NJX 1 OR 2 MUSC: CPT

## 2023-01-06 NOTE — PROGRESS NOTES
[address] [facility] Physical Therapy  Phone: [phone]   Fax: [phone]    Physical Therapy Daily Treatment Note    Date:  2023     Patient Name:  Jeremy Couch    :  1970  MRN: [id number]  Medical Diagnosis:  Piriformis syndrome, left [G57.02]  Treatment Diagnosis: sacrum dysfunction  Insurance/Certification information:  PT Insurance Information: juan carlos  Physician Information:  Preston Mullen[address] signed (Y/N): []  Yes [x]  No     Date of Patient follow up with Physician:      Progress Report: [x]  Yes PN  []  No     Date Range for reporting period:  Beginnin2022  PN: 23  Ending:     Progress report due (10 Rx/or 30 days whichever is less): 7381    Recertification due (POC duration/ or 90 days whichever is less): 3/5/23     Visit # Insurance Allowable Auth required? Date Range    40 []  Yes  []  No            Latex Allergy:  [x]NO      []YES  Preferred Language for Healthcare:   [x]English       []other:    Functional Scale:           Date assessed:  FOTO physical FS primary measure score = 38; risk adjusted = 57    FOTO physical FS primary measure score = 40     2023    SUBJECTIVE: Pt reports she continues to have the same level of pain in the glut that radiates down to the L anteromedial ankle. Patient reports she did not have any pain from dry needling/ESTIM from last session. Did travel over the holiday in car to [facility] and painful walking down to beach. OBJECTIVE:    : PN: Posture: L Pubic upslip, L5 NRLSR/ERSL, L4FRSL, ALLEY sacral torsion, L SI posterior innominate  LE MMT:   Right:  Left:  Hip Flexion:  Hip aBduction:  HKnee Extension:  Knee Flexion:   Ankle DF:   5/  4+/5  Ankle PF:   4+/5  4/5  Ankle Inversion:  5/  4/5  Ankle Eversion:   5/5  4-/5    /3: Z4SITFG/ERSL, L4FRSL, ALLEY sacral, L SI anterior   : MMT prone hip ext: L=4-, R=4+ .  Pt also has severe tightness in L gastroc  : Sig TP's in R glut med/ R glut min      RESTRICTIONS/PRECAUTIONS:     Exercises/Interventions:     Therapeutic Exercises (03250) x 20' Resistance / level Sets/time Reps Notes   Recumbent bike L3    IB calf stretch  HR-TR     HSS on step w/ rotation     HFS on step     Sciatic nerve glides supine  12/21 added to HEP and reviewed ways to do this in sitting    TA + DKTC feet on SB     TA + LTR feet on SB     Piriformis stretch w/ foot on SB     Bridge     Standing gastroc and soleus stretches 20\"  3 L  1/6 1/6: HEP          Therapeutic Activities (55722)       1/6: Time spent reassessing patient's strength, goals, and flexibility. Reviewed patient's current functional status ans well as goals. Discussed PT progressand additional PT benefit. Time spent writing PN and sent to MD X15'                                                              Neuromuscular Re-ed (41486)                                                 Manual Intervention ([address] [place] [facility]) x 25'       STM     MET correction L Pubic upslip, L5 NRLSR/ERSL, L4FRSL, ALLEY sacral torsion, L SI posterior innominate     MFR/ positional release     LAD    Manual  stretches B piriformis  And HS 20\"  3 L/R each    L Ankle/foot TC distractions Grade 3, Cuboid A/P mobs Grade 3          Modalities: consider DN (Pt has read and signed acknowledgement)    . 12/21/22: Dry needling manual therapy (18'): consisted on the placement of 6 needles in the following muscles:  L-upper glut max, L glut med x 2, L-glut min, B L4-S1 multifidi  A  mm needle was inserted, piston, rotated, and coned to produce intramuscular mobilization. These techniques were used to restore functional range of motion, reduce muscle spasm and induce healing in the corresponding musculature. (61039)  Clean Technique was utilized today while applying Dry needling treatment. The treatment sites where cleaned with 70% solution of  isopropyl alcohol .   The PT washed their hands and utilized treatment gloves along with hand  prior to inserting the needles. All needles where removed and discarded in the appropriate sharps container. MD has given verbal and/or written approval for this treatment. Attended low frequency (1-20Hz) electrical stimulation was utilized in conjunction with Dry Needling:  the Estim was manipulated between L4 & S1 multifidi  needles for a period of 10 min. at 2-3 volts. The low frequency electrical stimulation was used to help reduce muscle spasm and help to interrupt /Thornton the pain cycle. (22728)      01/03/2023 Dry needling manual therapy (15'): consisted on the placement of 6 needles in the following muscles: , B L4-S1 multifidi  and B medial and lateral piriformis A  mm needle was inserted, piston, rotated, and coned to produce intramuscular mobilization. These techniques were used to restore functional range of motion, reduce muscle spasm and induce healing in the corresponding musculature. (60839)  Clean Technique was utilized today while applying Dry needling treatment. The treatment sites where cleaned with 70% solution of  isopropyl alcohol . The PT washed their hands and utilized treatment gloves along with hand  prior to inserting the needles. All needles where removed and discarded in the appropriate sharps container. MD has given verbal and/or written approval for this treatment. Attended low frequency (1-20Hz) electrical stimulation was utilized in conjunction with Dry Needling:  the Estim was manipulated between L4 & S1 multifidi  needles for a period of 10 min. at 2-3 volts. The low frequency electrical stimulation was used to help reduce muscle spasm and help to interrupt /Thornton the pain cycle. (93426)      1/6: Dry needling manual therapy: consisted on the placement of 1 needles in the following muscles:  L soleus,. A 75 mm needle was inserted, piston, rotated, and coned to produce intramuscular mobilization.   These techniques were used to restore functional range of motion, reduce muscle spasm and induce healing in the corresponding musculature. (50217)  Clean Technique was utilized today while applying Dry needling treatment. The treatment sites where cleaned with 70% solution of  isopropyl alcohol . The PT washed their hands and utilized treatment gloves along with hand  prior to inserting the needles. All needles where removed and discarded in the appropriate sharps container. Pt. Education:  12/5/2022  -patient educated on diagnosis, prognosis and expectations for rehab  -all patient questions were answered  - pt taught correct body mechanics to protect her LB, reviewed her HEP and she is to continue it    Home Exercise Program:  12/5: pirfiormis stretching, fig 4 stretching, SKTC   12/21: instructed in supine/seated sciatic nerve glides. Access Code: [email or web address]  URL: [email or web address]  Date: 01/06/2023  Prepared by: [facility] on [address] [place] - 1 x daily - 7 x weekly - 1 sets - 3 reps - 20-30 sec hold  Supine Bridge - 1 x daily - 7 x weekly - 1 sets - 3 reps - 20-30 sec hold      Therapeutic Exercise and NMR EXR  [x] (41348) Provided verbal/tactile cueing for activities related to strengthening, flexibility, endurance, ROM for improvements in  [x] LE / Lumbar: LE, proximal hip, and core control with self care, mobility, lifting, ambulation. [] UE / Cervical: cervical, postural, scapular, scapulothoracic and UE control with self care, reaching, carrying, lifting, house/yardwork, driving, computer work.  [] (42154) Provided verbal/tactile cueing for activities related to improving balance, coordination, kinesthetic sense, posture, motor skill, proprioception to assist with   [] LE / lumbar: LE, proximal hip, and core control in self care, mobility, lifting, ambulation and eccentric single leg control.    [] UE / cervical: cervical, scapular, scapulothoracic and UE control with self care, reaching, carrying, lifting, house/yardwork, driving, computer work.   [] (43700) Therapist is in constant attendance of 2 or more patients providing skilled therapy interventions, but not providing any significant amount of measurable one-on-one time to either patient, for improvements in  [] LE / lumbar: LE, proximal hip, and core control in self care, mobility, lifting, ambulation and eccentric single leg control. [] UE / cervical: cervical, scapular, scapulothoracic and UE control with self care, reaching, carrying, lifting, house/yardwork, driving, computer work. NMR and Therapeutic Activities:    [x] (91337 or 53055) Provided verbal/tactile cueing for activities related to improving balance, coordination, kinesthetic sense, posture, motor skill, proprioception and motor activation to allow for proper function of   [x] LE: / Lumbar core, proximal hip and LE with self care and ADLs  [] UE / Cervical: cervical, postural, scapular, scapulothoracic and UE control with self care, carrying, lifting, driving, computer work.   [] (38143) Gait Re-education- Provided training and instruction to the patient for proper LE, core and proximal hip recruitment and positioning and eccentric body weight control with ambulation re-education including up and down stairs     Home Management Training / Self Care:  [] (41007) Provided self-care/home management training related to activities of daily living and compensatory training, and/or use of adaptive equipment for improvement with: ADLs and compensatory training, meal preparation, safety procedures and instruction in use of adaptive equipment, including bathing, grooming, dressing, personal hygiene, basic household cleaning and chores.      Home Exercise Program:    [] (01475) Reviewed/Progressed HEP activities related to strengthening, flexibility, endurance, ROM of   [x] LE / Lumbar: core, proximal hip and LE for functional self-care, mobility, lifting and ambulation/stair navigation   [] UE / Cervical: cervical, postural, scapular, scapulothoracic and UE control with self care, reaching, carrying, lifting, house/yardwork, driving, computer work  [] (30718)Reviewed/Progressed HEP activities related to improving balance, coordination, kinesthetic sense, posture, motor skill, proprioception of   [] LE: core, proximal hip and LE for self care, mobility, lifting, and ambulation/stair navigation    [] UE / Cervical: cervical, postural,  scapular, scapulothoracic and UE control with self care, reaching, carrying, lifting, house/yardwork, driving, computer work    Manual Treatments:  PROM / STM / Oscillations-Mobs:  G-I, II, III, IV (PA's, Inf., Post.)  [x] (20122) Provided manual therapy to mobilize LE, proximal hip and/or LS spine soft tissue/joints for the purpose of modulating pain, promoting relaxation,  increasing ROM, reducing/eliminating soft tissue swelling/inflammation/restriction, improving soft tissue extensibility and allowing for proper ROM for normal function with   [] LE / lumbar: self care, mobility, lifting and ambulation. [] UE / Cervical: self care, reaching, carrying, lifting, house/yardwork, driving, computer work. Modalities:  [] (82594) Vasopneumatic compression: Utilized vasopneumatic compression to decrease edema / swelling for the purpose of improving mobility and quad tone / recruitment which will allow for increased overall function including but not limited to self-care, transfers, ambulation, and ascending / descending stairs.        Charges:  Timed Code Treatment Minutes: 45   Total Treatment Minutes: 55     [] EVAL - LOW (01736)   [] EVAL - MOD (02073)  [] EVAL - HIGH (06926)  [] RE-EVAL (72993)  [] DR(35550) x   1    [] Ionto  [] NMR (94563) x       [] Vaso  [x] Manual (55718) x  2  [] Ultrasound  [x] TA x   1     [] [facility] (77362[facility]  [] Aquatic Therapy x     [] ES (un) (80395):   [] Home Management Training x  [] ES(attended) (70464)   [x] Dry Needling 1-2 muscles (53165):  [] Dry Needling 3+ muscles (37354)  [] Group:      [] Other:     GOALS:   Patient stated goal: walking and exercising with no pain  [] Progressing: [] Met: [] Not Met: [] Adjusted     Therapist goals for Patient:   Short Term Goals: To be achieved in: 2 weeks  1. Independent in HEP and progression per patient tolerance, in order to prevent re-injury. [] Progressing: [x] Met: [] Not Met: [] Adjusted  2. Patient will have a decrease in pain to facilitate improvement in movement, function, and ADLs as indicated by improvement with respect to Functional Deficits. [] Progressing: [x] Met: [] Not Met: [] Adjusted     Long Term Goals: To be achieved in: 6 weeks  1. FOTO functional survey score of >/= 61  to assist with reaching prior level of function. 48  [x] Progressing: [] Met: [x] Not Met: [] Adjusted  2. Patient will demonstrate increased AROM  to [facility], to allow for proper joint functioning to allow pt to resume walking  without increase in symptoms. [x] Progressing: [] Met: [x] Not Met: [] Adjusted  3. Patient will demonstrate increased Strength and core activation to allow for proper functional mobility as indicated by patients Functional Deficits to allow pt to resume exercising without increase in symptoms. [x] Progressing: [] Met: [x] Not Met: [] Adjusted  4. Patient will return to functional activities including driving to work without increased symptoms or restriction. [] Progressing: [] Met: [x] Not Met: [] Adjusted    Overall Progression Towards Functional goals/ Treatment Progress Update:  [x] Patient is progressing as expected towards functional goals listed. [] Progression is slowed due to complexities/Impairments listed. [] Progression has been slowed due to co-morbidities.   [] Plan just implemented, too soon to assess goals progression <30days   [] Goals require adjustment due to lack of progress  [] Patient is not progressing as expected and requires additional follow up with physician  [] Other    Persisting Functional Limitations/Impairments:  [x]Sleeping []Sitting               [x]Standing []Transfers        [x]Walking []Kneeling               [x]Stairs [x]Squatting / bending   [x]ADLs [x]Reaching  [x]Lifting  []Housework  [x]Driving []Job related tasks  []Sports/Recreation []Other:        ASSESSMENT:  Patient has completed 6 sessions of PT with slow but steady progress being made. Biggest complaint is still L ankle pain especially on inner tib posterior tendons. Addressed Several Postural faults inclufing upslips, L SI faults and Lumbar postural malalignments with manual PT. Pt tolerated treatment session well. Session focused on addressing the significant myofascial tightness in L soleus as well as gentle TC joint distractions and L cuboid mobs to improve TC mobility and decrease L ankle pain. .  Pt had some significant TPs in L soleus. s. She tolerated the DN in soleus pretty well. Address remaining positional faults and add in core level strengthening in next few sessions. Treatment/Activity Tolerance:  [x] Patient able to complete tx [] Patient limited by fatigue  [] Patient limited by pain  [] Patient limited by other medical complications  [] Other:     Prognosis: [x] Good [] Fair  [] Poor    Patient Requires Follow-up: [x] Yes  [] No    Plan for next treatment session: continue addressing the myofascial tightness. Check for spinal and SIJ alignment and motion in flexion. Continue DN. PLAN: See eval. PT 2x / week for 6 weeks. [x] Continue per plan of care [] Alter current plan (see comments)  [] Plan of care initiated [] Hold pending MD visit [] Discharge    Electronically signed by: Analia Ulloa, PT , MSPT    Note: If patient does not return for scheduled/ recommended follow up visits, this note will serve as a discharge from care along with most recent update on progress.

## 2023-01-09 ENCOUNTER — HOSPITAL ENCOUNTER (OUTPATIENT)
Dept: PHYSICAL THERAPY | Age: 53
Setting detail: THERAPIES SERIES
Discharge: HOME | End: 2023-01-09
Payer: COMMERCIAL

## 2023-01-09 PROCEDURE — 97140 MANUAL THERAPY 1/> REGIONS: CPT

## 2023-01-09 PROCEDURE — 20561 NDL INSJ W/O NJX 3+ MUSC: CPT

## 2023-01-09 PROCEDURE — 97150 GROUP THERAPEUTIC PROCEDURES: CPT

## 2023-01-09 PROCEDURE — 97110 THERAPEUTIC EXERCISES: CPT

## 2023-01-09 NOTE — PROGRESS NOTES
[address] [facility] Physical Therapy  Phone: [phone]   Fax: [phone]    Physical Therapy Daily Treatment Note    Date:  2023     Patient Name:  Chelly Calixto    :  1970  MRN: [id number]  Medical Diagnosis:  Piriformis syndrome, left [G57.02]  Treatment Diagnosis: sacrum dysfunction  Insurance/Certification information:  PT Insurance Information: anthem  Physician Information:  Lo Sheldon, [facility] [address] signed (Y/N): []  Yes [x]  No     Date of Patient follow up with Physician:      Progress Report: []  Yes PN  [x]  No     Date Range for reporting period:  Beginnin2022  PN: 23  Ending:     Progress report due (10 Rx/or 30 days whichever is less): 1225    Recertification due (POC duration/ or 90 days whichever is less): 3/5/23     Visit # Insurance Allowable Auth required? Date Range    40 []  Yes  []  No            Latex Allergy:  [x]NO      []YES  Preferred Language for Healthcare:   [x]English       []other:    Functional Scale:           Date assessed:  FOTO physical FS primary measure score = 38; risk adjusted = 57    FOTO physical FS primary measure score = 40     2023    PAIN LEVEL:  6/10 L glut, L ankle/calf    SUBJECTIVE: Pt reports she is not having LBP but continues to have L glut pain and sig L ankle pain which causes her to limp which then aggravates her L glut. She did get some relief from the dry needling last session but was sore the rest of the day. OBJECTIVE:    : PN: Posture: L Pubic upslip, L5 NRLSR/ERSL, L4FRSL, ALLEY sacral torsion, L SI posterior innominate  LE MMT:   Right:  Left:  Hip Flexion:  Hip aBduction:  HKnee Extension:  Knee Flexion:   Ankle DF:   5/5  4+/5  Ankle PF:   4+/5  4/5  Ankle Inversion:  5/  4/5  Ankle Eversion:   5/5  4-/5    /3: S1UAYNR/ERSL, L4FRSL, ALLEY sacral, L SI anterior   : MMT prone hip ext: L=4-, R=4+ .  Pt also has severe tightness in L gastroc  12/21: Sig TP's in R glut med/ R glut min      RESTRICTIONS/PRECAUTIONS:     Exercises/Interventions:     Therapeutic Exercises (40211) x 23' (10' billed as [facility] 1/9) Resistance / level Sets/time Reps Notes   Recumbent bike L3 5'     HEP instruction  3'  1/9/23 see below   IB calf stretch  HR-TR  30\"  1 2  12    HSS on step w/ rotation  30\" 2 L/R    HFS on step     Sciatic nerve glides supine  3 10 L 12/21 added to HEP and reviewed ways to do this in sitting    TA + DKTC feet on SB     TA + LTR feet on SB     Piriformis stretch w/ foot on SB     Bridge  1 10    SL bridge  2 5 R/L 1/9 added   Standing gastroc and soleus stretches 20\"  3 L  1/6 1/6: [facility]          Therapeutic Activities (08369)       1/6: Time spent reassessing patient's strength, goals, and flexibility. Reviewed patient's current functional status ans well as goals. Discussed PT progressand additional PT benefit. Time spent writing PN and sent to MD --   1/6/23                                                           Neuromuscular Re-ed (80036)                                                 Manual Intervention (68156) x 10'       STM  STM to L medial calf and piriformis 6'  1/9/23 done after DN   MET correction    MFR/ positional release     LAD    Manual  stretches    L Ankle/foot TC distractions Grade 3-4, Cuboid A/P mobs Grade 3 4'  1/9       Modalities: consider DN (Pt has read and signed acknowledgement)    . 12/21/22: Dry needling manual therapy (18'): consisted on the placement of 6 needles in the following muscles:  L-upper glut max, L glut med x 2, L-glut min, B L4-S1 multifidi  A  mm needle was inserted, piston, rotated, and coned to produce intramuscular mobilization. These techniques were used to restore functional range of motion, reduce muscle spasm and induce healing in the corresponding musculature. (26258)  Clean Technique was utilized today while applying Dry needling treatment.   The treatment sites where cleaned with 70% solution of  isopropyl alcohol . The PT washed their hands and utilized treatment gloves along with hand  prior to inserting the needles. All needles where removed and discarded in the appropriate sharps container. MD has given verbal and/or written approval for this treatment. Attended low frequency (1-20Hz) electrical stimulation was utilized in conjunction with Dry Needling:  the Estim was manipulated between L4 & S1 multifidi  needles for a period of 10 min. at 2-3 volts. The low frequency electrical stimulation was used to help reduce muscle spasm and help to interrupt /Gunter the pain cycle. (91218)      01/03/2023 Dry needling manual therapy (15'): consisted on the placement of 6 needles in the following muscles: , B L4-S1 multifidi  and B medial and lateral piriformis A  mm needle was inserted, piston, rotated, and coned to produce intramuscular mobilization. These techniques were used to restore functional range of motion, reduce muscle spasm and induce healing in the corresponding musculature. (23871)  Clean Technique was utilized today while applying Dry needling treatment. The treatment sites where cleaned with 70% solution of  isopropyl alcohol . The PT washed their hands and utilized treatment gloves along with hand  prior to inserting the needles. All needles where removed and discarded in the appropriate sharps container. MD has given verbal and/or written approval for this treatment. Attended low frequency (1-20Hz) electrical stimulation was utilized in conjunction with Dry Needling:  the Estim was manipulated between L4 & S1 multifidi  needles for a period of 10 min. at 2-3 volts. The low frequency electrical stimulation was used to help reduce muscle spasm and help to interrupt /Gunter the pain cycle. (77540)      1/6: Dry needling manual therapy: consisted on the placement of 1 needles in the following muscles:  L soleus,.   A 75 mm needle was inserted, piston, rotated, and coned to produce intramuscular mobilization. These techniques were used to restore functional range of motion, reduce muscle spasm and induce healing in the corresponding musculature. (75632)  Clean Technique was utilized today while applying Dry needling treatment. The treatment sites where cleaned with 70% solution of  isopropyl alcohol . The PT washed their hands and utilized treatment gloves along with hand  prior to inserting the needles. All needles where removed and discarded in the appropriate sharps container. 1/9/23: Dry needling manual therapy (20'): consisted on the placement of 7 needles in the following muscles:  L-upper glut max, L glut med, L piriformis x 2, L-glut min, L med gastroc, L med soleus. A  mm needle was inserted, piston, rotated, and coned to produce intramuscular mobilization. These techniques were used to restore functional range of motion, reduce muscle spasm and induce healing in the corresponding musculature. (60208)  Clean Technique was utilized today while applying Dry needling treatment. The treatment sites where cleaned with 70% solution of  isopropyl alcohol . The PT washed their hands and utilized treatment gloves along with hand  prior to inserting the needles. All needles where removed and discarded in the appropriate sharps container. MD has given verbal and/or written approval for this treatment. Pt. Education:  12/5/2022  -patient educated on diagnosis, prognosis and expectations for rehab  -all patient questions were answered  - pt taught correct body mechanics to protect her LB, reviewed her HEP and she is to continue it    Home Exercise Program:  12/5: pirfiormis stretching, fig 4 stretching, [facility]   12/21: instructed in supine/seated sciatic nerve glides. Access Code: [email or web address]  URL: [email or web address]  Date: 01/06/2023  Prepared by:  [facility] [place] on Juanjose Bang - 1 x daily - 7 x weekly - 1 sets - 3 reps - 20-30 sec hold  Supine Bridge - 1 x daily - 7 x weekly - 1 sets - 3 reps - 20-30 sec hold    1/9/23: instructed in single leg stance with doming of the medial arch 2-3 sets of 10 without shoes on      Therapeutic Exercise and NMR EXR  [x] (02904) Provided verbal/tactile cueing for activities related to strengthening, flexibility, endurance, ROM for improvements in  [x] LE / Lumbar: LE, proximal hip, and core control with self care, mobility, lifting, ambulation. [] UE / Cervical: cervical, postural, scapular, scapulothoracic and UE control with self care, reaching, carrying, lifting, house/yardwork, driving, computer work.  [] (82751) Provided verbal/tactile cueing for activities related to improving balance, coordination, kinesthetic sense, posture, motor skill, proprioception to assist with   [] LE / lumbar: LE, proximal hip, and core control in self care, mobility, lifting, ambulation and eccentric single leg control. [] UE / cervical: cervical, scapular, scapulothoracic and UE control with self care, reaching, carrying, lifting, house/yardwork, driving, computer work.   [] (33234) Therapist is in constant attendance of 2 or more patients providing skilled therapy interventions, but not providing any significant amount of measurable one-on-one time to either patient, for improvements in  [] LE / lumbar: LE, proximal hip, and core control in self care, mobility, lifting, ambulation and eccentric single leg control. [] UE / cervical: cervical, scapular, scapulothoracic and UE control with self care, reaching, carrying, lifting, house/yardwork, driving, computer work.      NMR and Therapeutic Activities:    [x] (52738 or 84657) Provided verbal/tactile cueing for activities related to improving balance, coordination, kinesthetic sense, posture, motor skill, proprioception and motor activation to allow for proper function of   [x] LE: / Lumbar core, proximal hip and LE with self care and ADLs  [] UE / Cervical: cervical, postural, scapular, scapulothoracic and UE control with self care, carrying, lifting, driving, computer work.   [] (49652) Gait Re-education- Provided training and instruction to the patient for proper LE, core and proximal hip recruitment and positioning and eccentric body weight control with ambulation re-education including up and down stairs     Home Management Training / Self Care:  [] (98266) Provided self-care/home management training related to activities of daily living and compensatory training, and/or use of adaptive equipment for improvement with: ADLs and compensatory training, meal preparation, safety procedures and instruction in use of adaptive equipment, including bathing, grooming, dressing, personal hygiene, basic household cleaning and chores.      Home Exercise Program:    [] (97014) Reviewed/Progressed HEP activities related to strengthening, flexibility, endurance, ROM of   [x] LE / Lumbar: core, proximal hip and LE for functional self-care, mobility, lifting and ambulation/stair navigation   [] UE / Cervical: cervical, postural, scapular, scapulothoracic and UE control with self care, reaching, carrying, lifting, house/yardwork, driving, computer work  [] (74822)Reviewed/Progressed HEP activities related to improving balance, coordination, kinesthetic sense, posture, motor skill, proprioception of   [] LE: core, proximal hip and LE for self care, mobility, lifting, and ambulation/stair navigation    [] UE / Cervical: cervical, postural,  scapular, scapulothoracic and UE control with self care, reaching, carrying, lifting, house/yardwork, driving, computer work    Manual Treatments:  PROM / STM / Oscillations-Mobs:  G-I, II, III, IV (PA's, Inf., Post.)  [x] (47154) Provided manual therapy to mobilize LE, proximal hip and/or LS spine soft tissue/joints for the purpose of modulating pain, promoting relaxation,  increasing ROM, reducing/eliminating soft tissue swelling/inflammation/restriction, improving soft tissue extensibility and allowing for proper ROM for normal function with   [] LE / lumbar: self care, mobility, lifting and ambulation. [] UE / Cervical: self care, reaching, carrying, lifting, house/yardwork, driving, computer work. Modalities:  [] (52715) Vasopneumatic compression: Utilized vasopneumatic compression to decrease edema / swelling for the purpose of improving mobility and quad tone / recruitment which will allow for increased overall function including but not limited to self-care, transfers, ambulation, and ascending / descending stairs. Charges:  Timed Code Treatment Minutes: 33   Total Treatment Minutes: 53     [] EVAL - LOW (16696)   [] EVAL - MOD (65565)  [] EVAL - HIGH (50295)  [] RE-EVAL (37605)  [x] XG(89042) x   1    [] Ionto  [] NMR (89121) x       [] Vaso  [x] Manual (00416) x  1  [] Ultrasound  [] TA x        [] Mech Traction (59446)  [] Aquatic Therapy x     [] ES (un) (29854):   [] Home Management Training x  [] ES(attended) (33793)   [] Dry Needling 1-2 muscles (42281):  [x] Dry Needling 3+ muscles (04832)  [x] Group:      [] Other:     GOALS:   Patient stated goal: walking and exercising with no pain  [] Progressing: [] Met: [] Not Met: [] Adjusted     Therapist goals for Patient:   Short Term Goals: To be achieved in: 2 weeks  1. Independent in HEP and progression per patient tolerance, in order to prevent re-injury. [] Progressing: [x] Met: [] Not Met: [] Adjusted  2. Patient will have a decrease in pain to facilitate improvement in movement, function, and ADLs as indicated by improvement with respect to Functional Deficits. [] Progressing: [x] Met: [] Not Met: [] Adjusted     Long Term Goals: To be achieved in: 6 weeks  1. FOTO functional survey score of >/= 61  to assist with reaching prior level of function. 48  [x] Progressing: [] Met: [x] Not Met: [] Adjusted  2.  Patient will demonstrate increased AROM  to [facility], to allow for proper joint functioning to allow pt to resume walking  without increase in symptoms. [x] Progressing: [] Met: [x] Not Met: [] Adjusted  3. Patient will demonstrate increased Strength and core activation to allow for proper functional mobility as indicated by patients Functional Deficits to allow pt to resume exercising without increase in symptoms. [x] Progressing: [] Met: [x] Not Met: [] Adjusted  4. Patient will return to functional activities including driving to work without increased symptoms or restriction. [] Progressing: [] Met: [x] Not Met: [] Adjusted    Overall Progression Towards Functional goals/ Treatment Progress Update:  [x] Patient is progressing as expected towards functional goals listed. [] Progression is slowed due to complexities/Impairments listed. [] Progression has been slowed due to co-morbidities. [] Plan just implemented, too soon to assess goals progression <30days   [] Goals require adjustment due to lack of progress  [] Patient is not progressing as expected and requires additional follow up with physician  [] Other    Persisting Functional Limitations/Impairments:  [x]Sleeping []Sitting               [x]Standing []Transfers        [x]Walking []Kneeling               [x]Stairs [x]Squatting / bending   [x]ADLs [x]Reaching  [x]Lifting  []Housework  [x]Driving []Job related tasks  []Sports/Recreation []Other:        ASSESSMENT:  L lateral piriformis TP and med soleus had significant TP's that when needled  referred pain to her ankle. Pt also had sig TP in L-glut min. Today's treatment focused on dry needling, manual therapy, stretches and some L glut activation. Pt is very tight along the entire posterior chain of the LLE. She states she tends to stand/ walk around often at home without shoes on. She exhibits flattened arch in the left foot and is weak in her post tib and very tight in the gastroc /soleus.  Instructed pt to work on doming exercises in bare feet but to wear her gym shoes when she is going to be on her feet awhile which might help support her arch better and decrease irritation of the ankle. Will need to continue skilled PT to strengthen L glut and decrease myofascial tightness. Treatment/Activity Tolerance:  [x] Patient able to complete tx [] Patient limited by fatigue  [] Patient limited by pain  [] Patient limited by other medical complications  [] Other:     Prognosis: [x] Good [] Fair  [] Poor    Patient Requires Follow-up: [x] Yes  [] No    Plan for next treatment session: continue addressing the myofascial tightness. Check for spinal and SIJ alignment and motion in flexion. Continue DN 1x/wk. Work on core strength/ L glut activation, posterior tib strength L ankle and include roller stick/STM next session. PLAN: See eval. PT 2x / week for 6 weeks. [x] Continue per plan of care [] Alter current plan (see comments)  [] Plan of care initiated [] Hold pending MD visit [] Discharge    Electronically signed by: Fatmata Rubio, PT , DPT    Note: If patient does not return for scheduled/ recommended follow up visits, this note will serve as a discharge from care along with most recent update on progress.

## 2023-01-13 ENCOUNTER — HOSPITAL ENCOUNTER (OUTPATIENT)
Dept: PHYSICAL THERAPY | Age: 53
Setting detail: THERAPIES SERIES
Discharge: HOME | End: 2023-01-13
Payer: COMMERCIAL

## 2023-01-13 NOTE — FLOWSHEET NOTE
[address]     Physical Therapy  Cancellation/No-show Note  Patient Name:  Dallas Khanna  :  1970   Date:  2023  Cancelled visits to date: 1  No-shows to date: 0    Patient status for today's appointment patient:  [x]  Cancelled     []  Rescheduled appointment  []  No-show     Reason given by patient:  [x]  Patient ill  []  Conflicting appointment  []  No transportation    []  Conflict with work  []  No reason given  []  Other:     Comments:      Phone call information:   []  Phone call made today to patient at _ time at number provided:      []  Patient answered, conversation as follows:    []  Patient did not answer, message left as follows:  []  Phone call not made today  [x]  Phone call not needed - pt contacted us to cancel and provided reason for cancellation.      Electronically signed by:  Carolyn Farnsworth PTA

## 2023-01-16 ENCOUNTER — HOSPITAL ENCOUNTER (OUTPATIENT)
Dept: PHYSICAL THERAPY | Age: 53
Setting detail: THERAPIES SERIES
Discharge: HOME | End: 2023-01-16
Payer: COMMERCIAL

## 2023-01-16 PROCEDURE — 97110 THERAPEUTIC EXERCISES: CPT

## 2023-01-16 PROCEDURE — 97140 MANUAL THERAPY 1/> REGIONS: CPT

## 2023-01-16 PROCEDURE — 20561 NDL INSJ W/O NJX 3+ MUSC: CPT

## 2023-01-16 NOTE — FLOWSHEET NOTE
[address] [facility] Physical Therapy  Phone: [phone]   Fax: [phone]    Physical Therapy Daily Treatment Note    Date:  2023     Patient Name:  Ryan Fagan    :  1970  MRN: [id number]  Medical Diagnosis:  Piriformis syndrome, left [G57.02]  Treatment Diagnosis: sacrum dysfunction  Insurance/Certification information:  PT Insurance Information: juan carlos  Physician Information:  Loreto Bravo, [address] [place] [address] signed (Y/N): []  Yes [x]  No     Date of Patient follow up with Physician:      Progress Report: []  Yes PN  [x]  No     Date Range for reporting period:  Beginnin2022  PN: 23  Ending:     Progress report due (10 Rx/or 30 days whichever is less): 9442    Recertification due (POC duration/ or 90 days whichever is less): 3/5/23     Visit # Insurance Allowable Auth required? Date Range    40 []  Yes  []  No            Latex Allergy:  [x]NO      []YES  Preferred Language for Healthcare:   [x]English       []other:    Functional Scale:           Date assessed:  FOTO physical FS primary measure score = 38; risk adjusted = 57    FOTO physical FS primary measure score = 40     2023    PAIN LEVEL:  3/10 L glut, 5/10 L ankle/calf    SUBJECTIVE: Pt reports her L glut pain is really improving. Her L calf/ankle pain still increases with walking distances. When she wears an aircast to walk on campus at work this seems to help. Her L ankle pops a lot. Even standing for 10 minutes hurts the ankle. She still has tightness in the glut and she feels the dry needling is helping both areas. Ankle pain is mostly medial    OBJECTIVE:    : Palpable pop/shift. She is unable to do a single leg HR on the left. (AROM plantar flexion L=40, R=55)  MMT: L-ankle DF=5, EV= 4, PF=3-, post tib= 4-     : PN: Posture: L Pubic upslip, L5 NRLSR/ERSL, L4FRSL, ALLEY sacral torsion, L SI posterior innominate  LE MMT:   Right:  Left:   Hip Flexion:  Hip aBduction:  HKnee Extension:  Knee Flexion:   Ankle DF:   5/5  4+/5  Ankle PF:   4+/5  4/5  Ankle Inversion:  5/5  4/5  Ankle Eversion:   5/5  4-/5    1/3: F2ZFTFT/ERSL, L4FRSL, ALLEY sacral, L SI anterior   12/7: MMT prone hip ext: L=4-, R=4+ . Pt also has severe tightness in L gastroc  12/21: Sig TP's in R glut med/ R glut min      RESTRICTIONS/PRECAUTIONS:     Exercises/Interventions:     Therapeutic Exercises (46934) x 33' (10' billed as [facility] 1/9) Resistance / level Sets/time Reps Notes   Recumbent bike L3 5'     HEP instruction  10' 1/16/23 see below   IB calf stretch  HR-TR  30\"  2 2  12 DL    HSS on step w/ rotation  30\" 2 L/R    HFS on step     Sciatic nerve glides supine  12/21 added to HEP and reviewed ways to do this in sitting    TA + DKTC feet on SB     TA + LTR feet on SB     Piriformis stretch w/ foot on SB     Bridge     SL bridge  2 6 R/L 1/9 added   Standing gastroc and soleus stretches 20\"  3 L  1/6 1/6: HEP   L ankle PRE's  PF, post tib, EV Green TB 2 10 ea way 1/16 added   Active HSS supine w/ strap to maintain DF thru knee flex->ext  3 10 L 1/16 added                 Therapeutic Activities (39745)       1/6: Time spent reassessing patient's strength, goals, and flexibility. Reviewed patient's current functional status ans well as goals. Discussed PT progressand additional PT benefit.   Time spent writing PN and sent to MD --   1/6/23                                                           Neuromuscular Re-ed (33742)                                                 Manual Intervention (16035) x10'       STM  STM to L  glut region, HS and calf with roller stick 8' 1/16/23 done after DN   MET correction    MFR/ positional release     LAD    Manual  stretches L ankle PF/DF with gentle OP 2'  1/16 done after DN   L Ankle/foot  1/9       Modalities: consider DN (Pt has read and signed acknowledgement)    12/21/22: Dry needling manual therapy (18'): consisted on the placement of 6 needles in the following muscles:  L-upper glut max, L glut med x 2, L-glut min, B L4-S1 multifidi  A  mm needle was inserted, piston, rotated, and coned to produce intramuscular mobilization. These techniques were used to restore functional range of motion, reduce muscle spasm and induce healing in the corresponding musculature. (19218)  Clean Technique was utilized today while applying Dry needling treatment. The treatment sites where cleaned with 70% solution of  isopropyl alcohol . The PT washed their hands and utilized treatment gloves along with hand  prior to inserting the needles. All needles where removed and discarded in the appropriate sharps container. MD has given verbal and/or written approval for this treatment. Attended low frequency (1-20Hz) electrical stimulation was utilized in conjunction with Dry Needling:  the Estim was manipulated between L4 & S1 multifidi  needles for a period of 10 min. at 2-3 volts. The low frequency electrical stimulation was used to help reduce muscle spasm and help to interrupt /[place] the pain cycle. (29180)      01/03/2023 Dry needling manual therapy (15'): consisted on the placement of 6 needles in the following muscles: , B L4-S1 multifidi  and B medial and lateral piriformis A  mm needle was inserted, piston, rotated, and coned to produce intramuscular mobilization. These techniques were used to restore functional range of motion, reduce muscle spasm and induce healing in the corresponding musculature. (65486)  Clean Technique was utilized today while applying Dry needling treatment. The treatment sites where cleaned with 70% solution of  isopropyl alcohol . The PT washed their hands and utilized treatment gloves along with hand  prior to inserting the needles. All needles where removed and discarded in the appropriate sharps container. MD has given verbal and/or written approval for this treatment.      Attended low frequency (1-20Hz) electrical stimulation was utilized in conjunction with Dry Needling:  the Estim was manipulated between L4 & S1 multifidi  needles for a period of 10 min. at 2-3 volts. The low frequency electrical stimulation was used to help reduce muscle spasm and help to interrupt /[place] the pain cycle. (73916)      1/6: Dry needling manual therapy: consisted on the placement of 1 needles in the following muscles:  L soleus,. A 75 mm needle was inserted, piston, rotated, and coned to produce intramuscular mobilization. These techniques were used to restore functional range of motion, reduce muscle spasm and induce healing in the corresponding musculature. (64859)  Clean Technique was utilized today while applying Dry needling treatment. The treatment sites where cleaned with 70% solution of  isopropyl alcohol . The PT washed their hands and utilized treatment gloves along with hand  prior to inserting the needles. All needles where removed and discarded in the appropriate sharps container. 1/9/23: Dry needling manual therapy (20'): consisted on the placement of 7 needles in the following muscles:  L-upper glut max, L glut med, L piriformis x 2, L-glut min, L med gastroc, L med soleus. A  mm needle was inserted, piston, rotated, and coned to produce intramuscular mobilization. These techniques were used to restore functional range of motion, reduce muscle spasm and induce healing in the corresponding musculature. (37416)  Clean Technique was utilized today while applying Dry needling treatment. The treatment sites where cleaned with 70% solution of  isopropyl alcohol . The PT washed their hands and utilized treatment gloves along with hand  prior to inserting the needles. All needles where removed and discarded in the appropriate sharps container. MD has given verbal and/or written approval for this treatment.      1/16/22:  Dry needling manual therapy (21'): consisted on the placement of 7 needles in the following muscles:   L post tib, L flexor digitorum, L medial soleus, L med gastroc,  L lateral gastroc, L piriformis near insertion, L inf gemelli, A  mm needle was inserted, piston, rotated, and coned to produce intramuscular mobilization. These techniques were used to restore functional range of motion, reduce muscle spasm and induce healing in the corresponding musculature. (41061)  Clean Technique was utilized today while applying Dry needling treatment. The treatment sites where cleaned with 70% solution of  isopropyl alcohol . The PT washed their hands and utilized treatment gloves along with hand  prior to inserting the needles. All needles where removed and discarded in the appropriate sharps container. MD has given verbal and/or written approval for this treatment. Pt. Education:  12/5/2022  -patient educated on diagnosis, prognosis and expectations for rehab  -all patient questions were answered  - pt taught correct body mechanics to protect her LB, reviewed her HEP and she is to continue it    Home Exercise Program:  12/5: pirfiormis stretching, fig 4 stretching, [facility]   12/21: instructed in supine/seated sciatic nerve glides. Access Code: [email or web address]  URL: [email or web address]  Date: 01/06/2023  Prepared by: [facility] on [address] - 1 x daily - 7 x weekly - 1 sets - 3 reps - 20-30 sec hold  Supine Bridge - 1 x daily - 7 x weekly - 1 sets - 3 reps - 20-30 sec hold    1/9/23: instructed in single leg stance with doming of the medial arch 2-3 sets of 10 without shoes on    Access Code: [email or web address]  URL: [email or web address]  Date: 01/16/2023  Prepared by:  Fatmata Rubio  Exercises (pt issued lime TB and did 3 sets of 10 ea exercise in the clinic)  Ankle Plantar Flexion with Resistance (Mirrored) - 1 x daily - 5-7 x weekly - 3 sets - 10 reps  Long Sitting Ankle PNF D2 Plantarflexion with Resistance - 1 x daily - 5-7 x weekly - 3 sets - 10 reps  Seated Ankle Eversion with Resistance - 1 x daily - 5-7 x weekly - 3 sets - 10 reps  Supine Hamstring Stretch with Strap - 1 x daily - 7 x weekly - 3 sets - 10 reps        Therapeutic Exercise and NMR EXR  [x] (80111) Provided verbal/tactile cueing for activities related to strengthening, flexibility, endurance, ROM for improvements in  [x] LE / Lumbar: LE, proximal hip, and core control with self care, mobility, lifting, ambulation. [] UE / Cervical: cervical, postural, scapular, scapulothoracic and UE control with self care, reaching, carrying, lifting, house/yardwork, driving, computer work.  [] (40361) Provided verbal/tactile cueing for activities related to improving balance, coordination, kinesthetic sense, posture, motor skill, proprioception to assist with   [] LE / lumbar: LE, proximal hip, and core control in self care, mobility, lifting, ambulation and eccentric single leg control. [] UE / cervical: cervical, scapular, scapulothoracic and UE control with self care, reaching, carrying, lifting, house/yardwork, driving, computer work.   [] (92433) Therapist is in constant attendance of 2 or more patients providing skilled therapy interventions, but not providing any significant amount of measurable one-on-one time to either patient, for improvements in  [] LE / lumbar: LE, proximal hip, and core control in self care, mobility, lifting, ambulation and eccentric single leg control. [] UE / cervical: cervical, scapular, scapulothoracic and UE control with self care, reaching, carrying, lifting, house/yardwork, driving, computer work.      NMR and Therapeutic Activities:    [x] (04639 or 63475) Provided verbal/tactile cueing for activities related to improving balance, coordination, kinesthetic sense, posture, motor skill, proprioception and motor activation to allow for proper function of   [x] LE: / Lumbar core, proximal hip and LE with self care and ADLs  [] UE / Cervical: cervical, postural, scapular, scapulothoracic and UE control with self care, carrying, lifting, driving, computer work.   [] (94552) Gait Re-education- Provided training and instruction to the patient for proper LE, core and proximal hip recruitment and positioning and eccentric body weight control with ambulation re-education including up and down stairs     Home Management Training / Self Care:  [] (12017) Provided self-care/home management training related to activities of daily living and compensatory training, and/or use of adaptive equipment for improvement with: ADLs and compensatory training, meal preparation, safety procedures and instruction in use of adaptive equipment, including bathing, grooming, dressing, personal hygiene, basic household cleaning and chores.      Home Exercise Program:    [] (09574) Reviewed/Progressed HEP activities related to strengthening, flexibility, endurance, ROM of   [x] LE / Lumbar: core, proximal hip and LE for functional self-care, mobility, lifting and ambulation/stair navigation   [] UE / Cervical: cervical, postural, scapular, scapulothoracic and UE control with self care, reaching, carrying, lifting, house/yardwork, driving, computer work  [] (23139)Reviewed/Progressed HEP activities related to improving balance, coordination, kinesthetic sense, posture, motor skill, proprioception of   [] LE: core, proximal hip and LE for self care, mobility, lifting, and ambulation/stair navigation    [] UE / Cervical: cervical, postural,  scapular, scapulothoracic and UE control with self care, reaching, carrying, lifting, house/yardwork, driving, computer work    Manual Treatments:  PROM / STM / Oscillations-Mobs:  G-I, II, III, IV (PA's, Inf., Post.)  [x] (31323) Provided manual therapy to mobilize LE, proximal hip and/or LS spine soft tissue/joints for the purpose of modulating pain, promoting relaxation,  increasing ROM, reducing/eliminating soft tissue swelling/inflammation/restriction, improving soft tissue extensibility and allowing for proper ROM for normal function with   [] LE / lumbar: self care, mobility, lifting and ambulation. [] UE / Cervical: self care, reaching, carrying, lifting, house/yardwork, driving, computer work. Modalities:  [] (67434) Vasopneumatic compression: Utilized vasopneumatic compression to decrease edema / swelling for the purpose of improving mobility and quad tone / recruitment which will allow for increased overall function including but not limited to self-care, transfers, ambulation, and ascending / descending stairs. Charges:  Timed Code Treatment Minutes: 45   Total Treatment Minutes: 77'     [] EVAL - LOW (455 1011)   [] EVAL - MOD (56194)  [] EVAL - HIGH (83757)  [] RE-EVAL (22171)  [x] MR(77163) x   2    [] Ionto  [] NMR (88423) x       [] Vaso  [x] Manual (32035) x  1  [] Ultrasound  [] TA x        [] Mech Traction (74529)  [] Aquatic Therapy x     [] ES (un) (38983):   [] Home Management Training x  [] ES(attended) (03408)   [] Dry Needling 1-2 muscles (45456):  [x] Dry Needling 3+ muscles (58823)  [] Group:      [] Other:     GOALS:   Patient stated goal: walking and exercising with no pain  [] Progressing: [] Met: [] Not Met: [] Adjusted     Therapist goals for Patient:   Short Term Goals: To be achieved in: 2 weeks  1. Independent in HEP and progression per patient tolerance, in order to prevent re-injury. [] Progressing: [x] Met: [] Not Met: [] Adjusted  2. Patient will have a decrease in pain to facilitate improvement in movement, function, and ADLs as indicated by improvement with respect to Functional Deficits. [] Progressing: [x] Met: [] Not Met: [] Adjusted     Long Term Goals: To be achieved in: 6 weeks  1. FOTO functional survey score of >/= 61  to assist with reaching prior level of function. 48  [x] Progressing: [] Met: [x] Not Met: [] Adjusted  2.  Patient will demonstrate increased AROM  to [facility] to allow for proper joint functioning to allow pt to resume walking  without increase in symptoms. [x] Progressing: [] Met: [x] Not Met: [] Adjusted  3. Patient will demonstrate increased Strength and core activation to allow for proper functional mobility as indicated by patients Functional Deficits to allow pt to resume exercising without increase in symptoms. [x] Progressing: [] Met: [x] Not Met: [] Adjusted  4. Patient will return to functional activities including driving to work without increased symptoms or restriction. [] Progressing: [] Met: [x] Not Met: [] Adjusted    Overall Progression Towards Functional goals/ Treatment Progress Update:  [x] Patient is progressing as expected towards functional goals listed. [] Progression is slowed due to complexities/Impairments listed. [] Progression has been slowed due to co-morbidities. [] Plan just implemented, too soon to assess goals progression <30days   [] Goals require adjustment due to lack of progress  [] Patient is not progressing as expected and requires additional follow up with physician  [] Other    Persisting Functional Limitations/Impairments:  [x]Sleeping []Sitting               [x]Standing []Transfers        [x]Walking []Kneeling               [x]Stairs [x]Squatting / bending   [x]ADLs [x]Reaching  [x]Lifting  []Housework  [x]Driving []Job related tasks  []Sports/Recreation []Other:        ASSESSMENT:  Today's treatment focused more on the left lower leg/ankle because pt's symptoms are worse here than the piriformis/glut. Pt exhibits significant weakness in the left gastroc /soleus and cannot do a single leg heel raise. She is also very weak in the post tib muscle and her arch flattens in standing. She is limited in AROM of plantar flexion and mildly DF of the left ankle and has palpable popping during some movements of the ankle.  Her left gastroc is tighter than the medial but she had active TP's more medially that reproduced the pain in the ankle and also in the lateral piriformis. Pt was instructed in some PRE's for ankle strengthening to do for HEP. Continue PT    Treatment/Activity Tolerance:  [x] Patient able to complete tx [] Patient limited by fatigue  [] Patient limited by pain  [] Patient limited by other medical complications  [] Other:     Prognosis: [x] Good [] Fair  [] Poor    Patient Requires Follow-up: [x] Yes  [] No    Plan for next treatment session: continue addressing the myofascial tightness. Check for spinal and SIJ alignment and motion in flexion. Continue DN 1x/wk. Work on core strength/ L glut activation, posterior tib strength L ankle and include roller stick/STM next session. PLAN: See eval. PT 2x / week for 6 weeks. [x] Continue per plan of care [] Alter current plan (see comments)  [] Plan of care initiated [] Hold pending MD visit [] Discharge    Electronically signed by: Fatmata Rubio, PT , DPT    Note: If patient does not return for scheduled/ recommended follow up visits, this note will serve as a discharge from care along with most recent update on progress.

## 2023-01-20 ENCOUNTER — APPOINTMENT (OUTPATIENT)
Dept: PHYSICAL THERAPY | Age: 53
End: 2023-01-20
Payer: COMMERCIAL

## 2023-01-20 ENCOUNTER — HOSPITAL ENCOUNTER (OUTPATIENT)
Dept: PHYSICAL THERAPY | Age: 53
Setting detail: THERAPIES SERIES
Discharge: HOME | End: 2023-01-20
Payer: COMMERCIAL

## 2023-01-20 PROCEDURE — 97110 THERAPEUTIC EXERCISES: CPT

## 2023-01-20 PROCEDURE — 20560 NDL INSJ W/O NJX 1 OR 2 MUSC: CPT

## 2023-01-20 PROCEDURE — 97140 MANUAL THERAPY 1/> REGIONS: CPT

## 2023-01-20 NOTE — FLOWSHEET NOTE
[address] [facility] Physical Therapy  Phone: [phone]   Fax: [phone]    Physical Therapy Daily Treatment Note    Date:  2023     Patient Name:  Kiki Bang    :  1970  MRN: [id number]  Medical Diagnosis:  Piriformis syndrome, left [G57.02]  Treatment Diagnosis: sacrum dysfunction  Insurance/Certification information:  PT Insurance Information: anthem  Physician Information:  Gregg Shepherd [facility] [place] signed (Y/N): []  Yes [x]  No     Date of Patient follow up with Physician:      Progress Report: []  Yes PN  [x]  No     Date Range for reporting period:  Beginnin2022  PN: 23  Ending:     Progress report due (10 Rx/or 30 days whichever is less):     Recertification due (POC duration/ or 90 days whichever is less): 3/5/23     Visit # Insurance Allowable Auth required? Date Range    40 []  Yes  []  No            Latex Allergy:  [x]NO      []YES  Preferred Language for Healthcare:   [x]English       []other:    Functional Scale:           Date assessed:  FOTO physical FS primary measure score = 38; risk adjusted = 57    FOTO physical FS primary measure score = 40     2023    PAIN LEVEL:  3/10 L glut, 5-8/10 L ankle/calf    SUBJECTIVE: Pt reportsshe felt loooser after last PT session but L medial ankle pain is persiiting. OBJECTIVE:    : FHL strength : L 3+/5 ; TP in FHL, FDL, Tibialis posterior  : Palpable pop/shift.  She is unable to do a single leg HR on the left. (AROM plantar flexion L=40, R=55)  MMT: L-ankle DF=5, EV= 4, PF=3-, post tib= 4-     : PN: Posture: L Pubic upslip, L5 NRLSR/ERSL, L4FRSL, ALLEY sacral torsion, L SI posterior innominate  LE MMT:   Right:  Left:  Hip Flexion:  Hip aBduction:  HKnee Extension:  Knee Flexion:   Ankle DF:   5/5  4+/5  Ankle PF:   4+/5  4/5  Ankle Inversion:  5/5  4/5  Ankle Eversion:   -/5    1/3: [id number]/ERSL, L4FRSL, ALLEY sacral, L SI anterior   : MMT prone hip ext: L=4-, R=4+ . Pt also has severe tightness in L gastroc  12/21: Sig TP's in R glut med/ R glut min      RESTRICTIONS/PRECAUTIONS:     Exercises/Interventions:     Therapeutic Exercises (39574) X15' Resistance / level Sets/time Reps Notes   Recumbent bike L3 5'     HEP instruction  10'  1/16/23 see below   IB calf stretch  HR-TR Gastroc and soleus 30\"    2 2    12 DL    HSS on step w/ rotation  30\" 2 L/R    HFS on step     Sciatic nerve glides supine  12/21 added to HEP and reviewed ways to do this in sitting    TA + DKTC feet on SB     TA + LTR feet on SB     Piriformis stretch w/ foot on SB     Bridge     SL bridge  1/9 added   Standing gastroc and soleus stretches 20\"  1/6: HEP   L ankle PRE's  PF, post tib, EV Green TB 1/16 added   Active HSS supine w/ strap to maintain DF thru knee flex->ext  3 10 L 1/16 added   Composite Toe flexion AROM  2 10 1/20          Therapeutic Activities (58384)       1/6: Time spent reassessing patient's strength, goals, and flexibility. Reviewed patient's current functional status ans well as goals. Discussed PT progressand additional PT benefit.   Time spent writing PN and sent to MD --   1/6/23                                                           Neuromuscular Re-ed (19827)                                                 Manual Intervention (38277) x15'       STM  STM to L posterior tibialis, FDL, FHL after DN X10' 10'  1/16/23 done after DN   MET correction    MFR/ positional release     LAD    Manual  stretches L ankle PF/DF with gentle OP 2'  1/16 done after DN   L Ankle/foot TC distractions Grade 3-4, Navicular A/P mobs Grade 3 4'  1/9       Modalities: consider DN (Pt has read and signed acknowledgement)    12/21/22: Dry needling manual therapy (18'): consisted on the placement of 6 needles in the following muscles:  L-upper glut max, L glut med x 2, L-glut min, B L4-S1 multifidi  A  mm needle was inserted, piston, rotated, and coned to produce intramuscular mobilization. These techniques were used to restore functional range of motion, reduce muscle spasm and induce healing in the corresponding musculature. (64764)  Clean Technique was utilized today while applying Dry needling treatment. The treatment sites where cleaned with 70% solution of  isopropyl alcohol . The PT washed their hands and utilized treatment gloves along with hand  prior to inserting the needles. All needles where removed and discarded in the appropriate sharps container. MD has given verbal and/or written approval for this treatment. Attended low frequency (1-20Hz) electrical stimulation was utilized in conjunction with Dry Needling:  the Estim was manipulated between L4 & S1 multifidi  needles for a period of 10 min. at 2-3 volts. The low frequency electrical stimulation was used to help reduce muscle spasm and help to interrupt /[place] the pain cycle. (90730)      01/03/2023 Dry needling manual therapy (15'): consisted on the placement of 6 needles in the following muscles: , B L4-S1 multifidi  and B medial and lateral piriformis A  mm needle was inserted, piston, rotated, and coned to produce intramuscular mobilization. These techniques were used to restore functional range of motion, reduce muscle spasm and induce healing in the corresponding musculature. (89178)  Clean Technique was utilized today while applying Dry needling treatment. The treatment sites where cleaned with 70% solution of  isopropyl alcohol . The PT washed their hands and utilized treatment gloves along with hand  prior to inserting the needles. All needles where removed and discarded in the appropriate sharps container. MD has given verbal and/or written approval for this treatment. Attended low frequency (1-20Hz) electrical stimulation was utilized in conjunction with Dry Needling:  the Estim was manipulated between L4 & S1 multifidi  needles for a period of 10 min. at 2-3 volts. The low frequency electrical stimulation was used to help reduce muscle spasm and help to interrupt /[place] the pain cycle. (13047)      1/6: Dry needling manual therapy: consisted on the placement of 1 needles in the following muscles:  L soleus,. A 75 mm needle was inserted, piston, rotated, and coned to produce intramuscular mobilization. These techniques were used to restore functional range of motion, reduce muscle spasm and induce healing in the corresponding musculature. (70037)  Clean Technique was utilized today while applying Dry needling treatment. The treatment sites where cleaned with 70% solution of  isopropyl alcohol . The PT washed their hands and utilized treatment gloves along with hand  prior to inserting the needles. All needles where removed and discarded in the appropriate sharps container. 1/9/23: Dry needling manual therapy (20'): consisted on the placement of 7 needles in the following muscles:  L-upper glut max, L glut med, L piriformis x 2, L-glut min, L med gastroc, L med soleus. A  mm needle was inserted, piston, rotated, and coned to produce intramuscular mobilization. These techniques were used to restore functional range of motion, reduce muscle spasm and induce healing in the corresponding musculature. (83195)  Clean Technique was utilized today while applying Dry needling treatment. The treatment sites where cleaned with 70% solution of  isopropyl alcohol . The PT washed their hands and utilized treatment gloves along with hand  prior to inserting the needles. All needles where removed and discarded in the appropriate sharps container. MD has given verbal and/or written approval for this treatment. 1/16/22:  Dry needling manual therapy (17'): consisted on the placement of 7 needles in the following muscles:   L post tib, L FHL, A 50-60 mm needle was inserted, piston, rotated, and coned to produce intramuscular mobilization. These techniques were used to restore functional range of motion, reduce muscle spasm and induce healing in the corresponding musculature. (74191)  Clean Technique was utilized today while applying Dry needling treatment. The treatment sites where cleaned with 70% solution of  isopropyl alcohol . The PT washed their hands and utilized treatment gloves along with hand  prior to inserting the needles. All needles where removed and discarded in the appropriate sharps container. MD has given verbal and/or written approval for this treatment. 1/20:     Pt. Education:  12/5/2022  -patient educated on diagnosis, prognosis and expectations for rehab  -all patient questions were answered  - pt taught correct body mechanics to protect her LB, reviewed her HEP and she is to continue it    Home Exercise Program:  12/5: pirfiormis stretching, fig 4 stretching, SKTC   12/21: instructed in supine/seated sciatic nerve glides. Access Code: [email or web address]  URL: [email or web address]  Date: 01/06/2023  Prepared by: [facility] on [place] - 1 x daily - 7 x weekly - 1 sets - 3 reps - 20-30 sec hold  Supine Bridge - 1 x daily - 7 x weekly - 1 sets - 3 reps - 20-30 sec hold    1/9/23: instructed in single leg stance with doming of the medial arch 2-3 sets of 10 without shoes on    Access Code: [email or web address]  URL: [email or web address]  Date: 01/16/2023  Prepared by:  Fatmata Rubio  Exercises (pt issued lime TB and did 3 sets of 10 ea exercise in the clinic)  Ankle Plantar Flexion with Resistance (Mirrored) - 1 x daily - 5-7 x weekly - 3 sets - 10 reps  Long Sitting Ankle PNF D2 Plantarflexion with Resistance - 1 x daily - 5-7 x weekly - 3 sets - 10 reps  Seated Ankle Eversion with Resistance - 1 x daily - 5-7 x weekly - 3 sets - 10 reps  Supine Hamstring Stretch with Strap - 1 x daily - 7 x weekly - 3 sets - 10 reps        Therapeutic Exercise and NMR EXR  [x] (25140) Provided verbal/tactile cueing for activities related to strengthening, flexibility, endurance, ROM for improvements in  [x] LE / Lumbar: LE, proximal hip, and core control with self care, mobility, lifting, ambulation. [] UE / Cervical: cervical, postural, scapular, scapulothoracic and UE control with self care, reaching, carrying, lifting, house/yardwork, driving, computer work.  [] (53971) Provided verbal/tactile cueing for activities related to improving balance, coordination, kinesthetic sense, posture, motor skill, proprioception to assist with   [] LE / lumbar: LE, proximal hip, and core control in self care, mobility, lifting, ambulation and eccentric single leg control. [] UE / cervical: cervical, scapular, scapulothoracic and UE control with self care, reaching, carrying, lifting, house/yardwork, driving, computer work.   [] (89439) Therapist is in constant attendance of 2 or more patients providing skilled therapy interventions, but not providing any significant amount of measurable one-on-one time to either patient, for improvements in  [] LE / lumbar: LE, proximal hip, and core control in self care, mobility, lifting, ambulation and eccentric single leg control. [] UE / cervical: cervical, scapular, scapulothoracic and UE control with self care, reaching, carrying, lifting, house/yardwork, driving, computer work.      NMR and Therapeutic Activities:    [x] (60535 or 32082) Provided verbal/tactile cueing for activities related to improving balance, coordination, kinesthetic sense, posture, motor skill, proprioception and motor activation to allow for proper function of   [x] LE: / Lumbar core, proximal hip and LE with self care and ADLs  [] UE / Cervical: cervical, postural, scapular, scapulothoracic and UE control with self care, carrying, lifting, driving, computer work.   [] (99978) Gait Re-education- Provided training and instruction to the patient for proper LE, core and proximal hip recruitment and positioning and eccentric body weight control with ambulation re-education including up and down stairs     Home Management Training / Self Care:  [] (74262) Provided self-care/home management training related to activities of daily living and compensatory training, and/or use of adaptive equipment for improvement with: ADLs and compensatory training, meal preparation, safety procedures and instruction in use of adaptive equipment, including bathing, grooming, dressing, personal hygiene, basic household cleaning and chores. Home Exercise Program:    [] (00440) Reviewed/Progressed HEP activities related to strengthening, flexibility, endurance, ROM of   [x] LE / Lumbar: core, proximal hip and LE for functional self-care, mobility, lifting and ambulation/stair navigation   [] UE / Cervical: cervical, postural, scapular, scapulothoracic and UE control with self care, reaching, carrying, lifting, house/yardwork, driving, computer work  [] (88347)Reviewed/Progressed HEP activities related to improving balance, coordination, kinesthetic sense, posture, motor skill, proprioception of   [] LE: core, proximal hip and LE for self care, mobility, lifting, and ambulation/stair navigation    [] UE / Cervical: cervical, postural,  scapular, scapulothoracic and UE control with self care, reaching, carrying, lifting, house/yardwork, driving, computer work    Manual Treatments:  PROM / STM / Oscillations-Mobs:  G-I, II, III, IV (PA's, Inf., Post.)  [x] (11157) Provided manual therapy to mobilize LE, proximal hip and/or LS spine soft tissue/joints for the purpose of modulating pain, promoting relaxation,  increasing ROM, reducing/eliminating soft tissue swelling/inflammation/restriction, improving soft tissue extensibility and allowing for proper ROM for normal function with   [] LE / lumbar: self care, mobility, lifting and ambulation. [] UE / Cervical: self care, reaching, carrying, lifting, house/yardwork, driving, computer work.     Modalities:  [] (87782) Vasopneumatic compression: Utilized vasopneumatic compression to decrease edema / swelling for the purpose of improving mobility and quad tone / recruitment which will allow for increased overall function including but not limited to self-care, transfers, ambulation, and ascending / descending stairs.       Charges:  Timed Code Treatment Minutes: 28   Total Treatment Minutes: 45     [] EVAL - LOW (04016)   [] EVAL - MOD (02260)  [] EVAL - HIGH (10470)  [] RE-EVAL (73493)  [x] TE(01983) x   1    [] Ionto  [] NMR (32976) x       [] Vaso  [x] Manual (63816) x  1  [] Ultrasound  [] TA x        [] Mech Traction (97012)  [] Aquatic Therapy x     [] ES (un) (97014):   [] Home Management Training x  [] ES(attended) (97032)   [x] Dry Needling 1-2 muscles (20560):  [] Dry Needling 3+ muscles (20561)  [] Group:      [] Other:     GOALS:   Patient stated goal: walking and exercising with no pain  [] Progressing: [] Met: [] Not Met: [] Adjusted     Therapist goals for Patient:   Short Term Goals: To be achieved in: 2 weeks  1. Independent in HEP and progression per patient tolerance, in order to prevent re-injury.   [] Progressing: [x] Met: [] Not Met: [] Adjusted  2. Patient will have a decrease in pain to facilitate improvement in movement, function, and ADLs as indicated by improvement with respect to Functional Deficits.  [] Progressing: [x] Met: [] Not Met: [] Adjusted     Long Term Goals: To be achieved in: 6 weeks  1. FOTO functional survey score of >/= 61  to assist with reaching prior level of function. 50  [x] Progressing: [] Met: [x] Not Met: [] Adjusted  2. Patient will demonstrate increased AROM  to WFL, to allow for proper joint functioning to allow pt to resume walking  without increase in symptoms.   [x] Progressing: [] Met: [x] Not Met: [] Adjusted  3. Patient will demonstrate increased Strength and core activation to allow for proper functional mobility as indicated by patients  Functional Deficits to allow pt to resume exercising without increase in symptoms. [x] Progressing: [] Met: [x] Not Met: [] Adjusted  4. Patient will return to functional activities including driving to work without increased symptoms or restriction. [] Progressing: [] Met: [x] Not Met: [] Adjusted    Overall Progression Towards Functional goals/ Treatment Progress Update:  [x] Patient is progressing as expected towards functional goals listed. [] Progression is slowed due to complexities/Impairments listed. [] Progression has been slowed due to co-morbidities. [] Plan just implemented, too soon to assess goals progression <30days   [] Goals require adjustment due to lack of progress  [] Patient is not progressing as expected and requires additional follow up with physician  [] Other    Persisting Functional Limitations/Impairments:  [x]Sleeping []Sitting               [x]Standing []Transfers        [x]Walking []Kneeling               [x]Stairs [x]Squatting / bending   [x]ADLs [x]Reaching  [x]Lifting  []Housework  [x]Driving []Job related tasks  []Sports/Recreation []Other:        ASSESSMENT:  Today's treatment continued to focus more on the left lower leg/ankle because pt's symptoms are worse here than the piriformis/glut. She is also very weak in the post tib muscle and her arch flattens in standing. She is limited in AROM of plantar flexion and mildly DF of the left ankle and has palpable popping during some movements of the ankle. Large active TP's in FHL, Posterior tib, FDL but responded with good twitch responses with DN/STM. Continue PT. Treatment/Activity Tolerance:  [x] Patient able to complete tx [] Patient limited by fatigue  [] Patient limited by pain  [] Patient limited by other medical complications  [] Other:     Prognosis: [x] Good [] Fair  [] Poor    Patient Requires Follow-up: [x] Yes  [] No    Plan for next treatment session: continue addressing the myofascial tightness.  Check for spinal and SIJ alignment and motion in flexion. Continue DN 1x/wk. Work on core strength/ L glut activation, posterior tib strength L ankle and include roller stick/STM next session. PLAN: See eval. PT 2x / week for 6 weeks. [x] Continue per plan of care [] Alter current plan (see comments)  [] Plan of care initiated [] Hold pending MD visit [] Discharge    Electronically signed by: Pablo Jara, PT , MSPT    Note: If patient does not return for scheduled/ recommended follow up visits, this note will serve as a discharge from care along with most recent update on progress.

## 2023-01-23 ENCOUNTER — HOSPITAL ENCOUNTER (OUTPATIENT)
Dept: PHYSICAL THERAPY | Age: 53
Setting detail: THERAPIES SERIES
Discharge: HOME | End: 2023-01-23
Payer: COMMERCIAL

## 2023-01-23 PROCEDURE — 97140 MANUAL THERAPY 1/> REGIONS: CPT

## 2023-01-23 PROCEDURE — 97112 NEUROMUSCULAR REEDUCATION: CPT

## 2023-01-23 PROCEDURE — 97110 THERAPEUTIC EXERCISES: CPT

## 2023-01-23 NOTE — FLOWSHEET NOTE
[address] [facility] Physical Therapy  Phone: [phone]   Fax: [phone]    Physical Therapy Daily Treatment Note    Date:  2023     Patient Name:  Mary Rodas    :  1970  MRN: [id number]  Medical Diagnosis:  Piriformis syndrome, left [G57.02]  Treatment Diagnosis: sacrum dysfunction  Insurance/Certification information:  PT Insurance Information: anthem  Physician Information:  Cleora Hashimoto, [facility] [address] signed (Y/N): []  Yes [x]  No     Date of Patient follow up with Physician:      Progress Report: []  Yes PN  [x]  No     Date Range for reporting period:  Beginnin2022  PN: 23  Ending:     Progress report due (10 Rx/or 30 days whichever is less): 345    Recertification due (POC duration/ or 90 days whichever is less): 3/5/23     Visit # Insurance Allowable Auth required? Date Range   10/ 12 40 []  Yes  []  No            Latex Allergy:  [x]NO      []YES  Preferred Language for Healthcare:   [x]English       []other:    Functional Scale:           Date assessed:  FOTO physical FS primary measure score = 38; risk adjusted = 57    FOTO physical FS primary measure score = 40     2023    PAIN LEVEL:  3/10 L glut, 5-8/10 L ankle/calf    SUBJECTIVE: patient reports ankle pain on L about the same but she stood for several hours on Saturday at an event and walked a great deal.  Made appointment with MD for next week for ankle and L ankle swelling. Ordered OTC orthotics but not in yet. OBJECTIVE:    : Postural alignment: R anterior innominate SI, L Pubic upslip, K6RXUGN/FRSL, L4ERSL, ALLEY sacral torsion  : FHL strength : L 3+/5 ; TP in FHL, FDL, Tibialis posterior  : Palpable pop/shift.  She is unable to do a single leg HR on the left. (AROM plantar flexion L=40, R=55)  MMT: L-ankle DF=5, EV= 4, PF=3-, post tib= 4-     : PN: Posture: L Pubic upslip, L5 NRLSR/ERSL, L4FRSL, ALLEY sacral torsion, L SI posterior innominate  LE MMT:   Right:  Left:  Hip Flexion:  Hip aBduction:  HKnee Extension:  Knee Flexion:   Ankle DF:   5/5  4+/5  Ankle PF:   4+/5  4/5  Ankle Inversion:  5/5  4/5  Ankle Eversion:   5/5  4-/5    1/3: X1BGCSD/ERSL, L4FRSL, ALLEY sacral, L SI anterior   12/7: MMT prone hip ext: L=4-, R=4+ . Pt also has severe tightness in L gastroc  12/21: Ángel Taveras TP's in R glut med/ R glut min      RESTRICTIONS/PRECAUTIONS:     Exercises/Interventions:     Therapeutic Exercises (19664) X8' Resistance / level Sets/time Reps Notes   Recumbent bike     HEP instruction   1/16/23 see below   IB calf stretch  HR-TR Gastroc and soleus 30\"    2 2    12 DL    HSS on step w/ rotation     HFS on step     Sciatic nerve glides supine  12/21 added to HEP and reviewed ways to do this in sitting    TA + DKTC feet on SB  1 15 1/23 ^ reps   TA + LTR feet on SB  1 15LR 1/23 ^ reps    Piriformis stretch w/ foot on SB     Bridge     SL bridge  1/9 added   Standing gastroc and soleus stretches 20\"  1/6: HEP   L ankle PRE's  PF, post tib, EV Green TB 1/16 added   Active HSS supine w/ strap to maintain DF thru knee flex->ext  1/16 added   Composite Toe flexion AROM  1/20   Open books to R in L sidelying  1 10 1/23   Therapeutic Activities (50441)       1/6: Time spent reassessing patient's strength, goals, and flexibility. Reviewed patient's current functional status ans well as goals. Discussed PT progressand additional PT benefit.   Time spent writing PN and sent to MD --   1/6/23                                                           Neuromuscular Re-ed (31217) X23'       Hands?knees H rock with lumbar lordosis  Partial rock 1 10 1/23/2023: HEP   Quadruped Pelvic tilt   1/5\" 5 1/23: HEP       HEP   Quadruped B UE presses  1/3\" 10 1/23: HEP   Quadruped L/R unilateral GH flexion  1 10 1/23: HEP   Quadruped Multifidus lift with foam Airex under knee  1  1 8 R  3 L 1/23   Bent knee fall out  1 15 L/R 1/23: HEP   Manual Intervention ([id number]) x15' STM   1/16/23 done after DN   MET correction R anterior innominate SI, L Pubic upslip, A3WRPOM/FRSL, L4ERSL, ALLEY sacral torsion  15'    MFR/ positional release     LAD    Manual  stretches L ankle PF/DF with gentle OP  1/16 done after DN   L Ankle/foot TC distractions Grade 3-4, Navicular A/P mobs Grade 3  1/9       Modalities: consider DN (Pt has read and signed acknowledgement)    12/21/22: Dry needling manual therapy (18'): consisted on the placement of 6 needles in the following muscles:  L-upper glut max, L glut med x 2, L-glut min, B L4-S1 multifidi  A  mm needle was inserted, piston, rotated, and coned to produce intramuscular mobilization. These techniques were used to restore functional range of motion, reduce muscle spasm and induce healing in the corresponding musculature. (12994)  Clean Technique was utilized today while applying Dry needling treatment. The treatment sites where cleaned with 70% solution of  isopropyl alcohol . The PT washed their hands and utilized treatment gloves along with hand  prior to inserting the needles. All needles where removed and discarded in the appropriate sharps container. MD has given verbal and/or written approval for this treatment. Attended low frequency (1-20Hz) electrical stimulation was utilized in conjunction with Dry Needling:  the Estim was manipulated between L4 & S1 multifidi  needles for a period of 10 min. at 2-3 volts. The low frequency electrical stimulation was used to help reduce muscle spasm and help to interrupt /[place] the pain cycle. (57616)      01/03/2023 Dry needling manual therapy (15'): consisted on the placement of 6 needles in the following muscles: , B L4-S1 multifidi  and B medial and lateral piriformis A  mm needle was inserted, piston, rotated, and coned to produce intramuscular mobilization.   These techniques were used to restore functional range of motion, reduce muscle spasm and induce healing in the corresponding musculature. (09887)  Clean Technique was utilized today while applying Dry needling treatment. The treatment sites where cleaned with 70% solution of  isopropyl alcohol . The PT washed their hands and utilized treatment gloves along with hand  prior to inserting the needles. All needles where removed and discarded in the appropriate sharps container. MD has given verbal and/or written approval for this treatment. Attended low frequency (1-20Hz) electrical stimulation was utilized in conjunction with Dry Needling:  the Estim was manipulated between L4 & S1 multifidi  needles for a period of 10 min. at 2-3 volts. The low frequency electrical stimulation was used to help reduce muscle spasm and help to interrupt /[place] the pain cycle. (15799)      1/6: Dry needling manual therapy: consisted on the placement of 1 needles in the following muscles:  L soleus,. A 75 mm needle was inserted, piston, rotated, and coned to produce intramuscular mobilization. These techniques were used to restore functional range of motion, reduce muscle spasm and induce healing in the corresponding musculature. (47799)  Clean Technique was utilized today while applying Dry needling treatment. The treatment sites where cleaned with 70% solution of  isopropyl alcohol . The PT washed their hands and utilized treatment gloves along with hand  prior to inserting the needles. All needles where removed and discarded in the appropriate sharps container. 1/9/23: Dry needling manual therapy (20'): consisted on the placement of 7 needles in the following muscles:  L-upper glut max, L glut med, L piriformis x 2, L-glut min, L med gastroc, L med soleus. A  mm needle was inserted, piston, rotated, and coned to produce intramuscular mobilization. These techniques were used to restore functional range of motion, reduce muscle spasm and induce healing in the corresponding musculature.  651 [id number] Technique was utilized today while applying Dry needling treatment. The treatment sites where cleaned with 70% solution of  isopropyl alcohol . The PT washed their hands and utilized treatment gloves along with hand  prior to inserting the needles. All needles where removed and discarded in the appropriate sharps container. MD has given verbal and/or written approval for this treatment. 1/16/22:  Dry needling manual therapy (17'): consisted on the placement of 7 needles in the following muscles:   L post tib, L FHL, A 50-60 mm needle was inserted, piston, rotated, and coned to produce intramuscular mobilization. These techniques were used to restore functional range of motion, reduce muscle spasm and induce healing in the corresponding musculature. (19317)  Clean Technique was utilized today while applying Dry needling treatment. The treatment sites where cleaned with 70% solution of  isopropyl alcohol . The PT washed their hands and utilized treatment gloves along with hand  prior to inserting the needles. All needles where removed and discarded in the appropriate sharps container. MD has given verbal and/or written approval for this treatment. 1/20:     Pt. Education:  12/5/2022  -patient educated on diagnosis, prognosis and expectations for rehab  -all patient questions were answered  - pt taught correct body mechanics to protect her LB, reviewed her HEP and she is to continue it    Home Exercise Program:  12/5: pirfiormis stretching, fig 4 stretching, SKTC   12/21: instructed in supine/seated sciatic nerve glides. Access Code: [email or web address]  URL: [email or web address]  Date: 01/06/2023  Prepared by:  [facility] on [place] - 1 x daily - 7 x weekly - 1 sets - 3 reps - 20-30 sec hold  Supine Bridge - 1 x daily - 7 x weekly - 1 sets - 3 reps - 20-30 sec hold    1/9/23: instructed in single leg stance with doming of the medial arch 2-3 sets of 10 without shoes on    Access Code: [email or web address]  URL: [email or web address]  Date: 01/16/2023  Prepared by: Fatmata Rubio  Exercises (pt issued lime TB and did 3 sets of 10 ea exercise in the clinic)  Ankle Plantar Flexion with Resistance (Mirrored) - 1 x daily - 5-7 x weekly - 3 sets - 10 reps  Long Sitting Ankle PNF D2 Plantarflexion with Resistance - 1 x daily - 5-7 x weekly - 3 sets - 10 reps  Seated Ankle Eversion with Resistance - 1 x daily - 5-7 x weekly - 3 sets - 10 reps  Supine Hamstring Stretch with Strap - 1 x daily - 7 x weekly - 3 sets - 10 reps    Access Code: [email or web address]  URL: [email or web address]  Date: 01/23/2023  Prepared by: [facility]    Exercises  Bent Knee Fallouts - 1 x daily - 7 x weekly - 3 sets - 10 reps  Quadruped Rocking Backward - 1 x daily - 7 x weekly - 3 sets - 10 reps  Primal Push Up - 1 x daily - 7 x weekly - 1 sets - 10 reps  Quadruped Alternating Arm Lift - 1 x daily - 7 x weekly - 3 sets - 10 reps  Quadruped Hip Hike on Foam - 1 x daily - 7 x weekly - 1 sets - 10 reps  Standing 'L' Stretch at Counter - 1 x daily - 7 x weekly - 3 sets - 10 reps      Therapeutic Exercise and NMR EXR  [x] (48679) Provided verbal/tactile cueing for activities related to strengthening, flexibility, endurance, ROM for improvements in  [x] LE / Lumbar: LE, proximal hip, and core control with self care, mobility, lifting, ambulation. [] UE / Cervical: cervical, postural, scapular, scapulothoracic and UE control with self care, reaching, carrying, lifting, house/yardwork, driving, computer work.  [] (34692) Provided verbal/tactile cueing for activities related to improving balance, coordination, kinesthetic sense, posture, motor skill, proprioception to assist with   [] LE / lumbar: LE, proximal hip, and core control in self care, mobility, lifting, ambulation and eccentric single leg control.    [] UE / cervical: cervical, scapular, scapulothoracic and UE control with self care, reaching, carrying, lifting, house/yardwork, driving, computer work.   [] (88788) Therapist is in constant attendance of 2 or more patients providing skilled therapy interventions, but not providing any significant amount of measurable one-on-one time to either patient, for improvements in  [] LE / lumbar: LE, proximal hip, and core control in self care, mobility, lifting, ambulation and eccentric single leg control. [] UE / cervical: cervical, scapular, scapulothoracic and UE control with self care, reaching, carrying, lifting, house/yardwork, driving, computer work. NMR and Therapeutic Activities:    [x] (03939 or 36976) Provided verbal/tactile cueing for activities related to improving balance, coordination, kinesthetic sense, posture, motor skill, proprioception and motor activation to allow for proper function of   [x] LE: / Lumbar core, proximal hip and LE with self care and ADLs  [] UE / Cervical: cervical, postural, scapular, scapulothoracic and UE control with self care, carrying, lifting, driving, computer work.   [] (60230) Gait Re-education- Provided training and instruction to the patient for proper LE, core and proximal hip recruitment and positioning and eccentric body weight control with ambulation re-education including up and down stairs     Home Management Training / Self Care:  [] (03472) Provided self-care/home management training related to activities of daily living and compensatory training, and/or use of adaptive equipment for improvement with: ADLs and compensatory training, meal preparation, safety procedures and instruction in use of adaptive equipment, including bathing, grooming, dressing, personal hygiene, basic household cleaning and chores.      Home Exercise Program:    [] (55453) Reviewed/Progressed HEP activities related to strengthening, flexibility, endurance, ROM of   [x] LE / Lumbar: core, proximal hip and LE for functional self-care, mobility, lifting and ambulation/stair navigation   [] UE / Cervical: cervical, postural, scapular, scapulothoracic and UE control with self care, reaching, carrying, lifting, house/yardwork, driving, computer work  [] (45979)Reviewed/Progressed HEP activities related to improving balance, coordination, kinesthetic sense, posture, motor skill, proprioception of   [] LE: core, proximal hip and LE for self care, mobility, lifting, and ambulation/stair navigation    [] UE / Cervical: cervical, postural,  scapular, scapulothoracic and UE control with self care, reaching, carrying, lifting, house/yardwork, driving, computer work    Manual Treatments:  PROM / STM / Oscillations-Mobs:  G-I, II, III, IV (PA's, Inf., Post.)  [x] (55982) Provided manual therapy to mobilize LE, proximal hip and/or LS spine soft tissue/joints for the purpose of modulating pain, promoting relaxation,  increasing ROM, reducing/eliminating soft tissue swelling/inflammation/restriction, improving soft tissue extensibility and allowing for proper ROM for normal function with   [] LE / lumbar: self care, mobility, lifting and ambulation. [] UE / Cervical: self care, reaching, carrying, lifting, house/yardwork, driving, computer work. Modalities:  [] (30770) Vasopneumatic compression: Utilized vasopneumatic compression to decrease edema / swelling for the purpose of improving mobility and quad tone / recruitment which will allow for increased overall function including but not limited to self-care, transfers, ambulation, and ascending / descending stairs.        Charges:  Timed Code Treatment Minutes: 54   Total Treatment Minutes: 54     [] EVAL - LOW (55654)   [] EVAL - MOD (12212)  [] EVAL - HIGH (87301)  [] RE-EVAL (54113)  [x] FA(55080) x   1    [] Ionto  [x] NMR (84197) x  2     [] Vaso  [x] Manual (36440) x  1  [] Ultrasound  [] TA x        [] Mech Traction (14546)  [] Aquatic Therapy x     [] ES (un) (77234):   [] Home Management Training x  [] ES(attended) (56023)   [] Dry Needling 1-2 muscles (25190):  [] Dry Needling 3+ muscles (61410)  [] Group:      [] Other:     GOALS:   Patient stated goal: walking and exercising with no pain  [] Progressing: [] Met: [] Not Met: [] Adjusted     Therapist goals for Patient:   Short Term Goals: To be achieved in: 2 weeks  1. Independent in HEP and progression per patient tolerance, in order to prevent re-injury. [] Progressing: [x] Met: [] Not Met: [] Adjusted  2. Patient will have a decrease in pain to facilitate improvement in movement, function, and ADLs as indicated by improvement with respect to Functional Deficits. [] Progressing: [x] Met: [] Not Met: [] Adjusted     Long Term Goals: To be achieved in: 6 weeks  1. FOTO functional survey score of >/= 61  to assist with reaching prior level of function. 48  [x] Progressing: [] Met: [x] Not Met: [] Adjusted  2. Patient will demonstrate increased AROM  to [facility], to allow for proper joint functioning to allow pt to resume walking  without increase in symptoms. [x] Progressing: [] Met: [x] Not Met: [] Adjusted  3. Patient will demonstrate increased Strength and core activation to allow for proper functional mobility as indicated by patients Functional Deficits to allow pt to resume exercising without increase in symptoms. [x] Progressing: [] Met: [x] Not Met: [] Adjusted  4. Patient will return to functional activities including driving to work without increased symptoms or restriction. [] Progressing: [] Met: [x] Not Met: [] Adjusted    Overall Progression Towards Functional goals/ Treatment Progress Update:  [x] Patient is progressing as expected towards functional goals listed. [] Progression is slowed due to complexities/Impairments listed. [] Progression has been slowed due to co-morbidities.   [] Plan just implemented, too soon to assess goals progression <30days   [] Goals require adjustment due to lack of progress  [] Patient is not progressing as expected and requires additional follow up with physician  [] Other    Persisting Functional Limitations/Impairments:  [x]Sleeping []Sitting               [x]Standing []Transfers        [x]Walking []Kneeling               [x]Stairs [x]Squatting / bending   [x]ADLs [x]Reaching  [x]Lifting  []Housework  [x]Driving []Job related tasks  []Sports/Recreation []Other:        ASSESSMENT:  Today's treatment focused on correction of stiffness/Lumbosacral postural malalignments initially followed with activation of core lower abdominals/multifidi at low levels due to weakness. fatigue. Will have OTC orthotics in this week for external support of ankle,   Severe weakness in core/B multifidi and 2-/5 on L contributing to decreased stability in Lumbar spine and weakness in L4/L5 myotomes exacerbating L posterior ti/FHL tendons. Continue to progress and do reassessment in next 1-2 sessions. .Continue PT. Treatment/Activity Tolerance:  [x] Patient able to complete tx [] Patient limited by fatigue  [] Patient limited by pain  [] Patient limited by other medical complications  [] Other:     Prognosis: [x] Good [] Fair  [] Poor    Patient Requires Follow-up: [x] Yes  [] No    Plan for next treatment session: continue addressing the myofascial tightness. Check for spinal and SIJ alignment and motion in flexion. Continue DN 1x/wk. Work on core strength/ L glut activation, posterior tib strength L ankle and include roller stick/STM next session. PLAN: See eval. PT 2x / week for 6 weeks. [x] Continue per plan of care [] Alter current plan (see comments)  [] Plan of care initiated [] Hold pending MD visit [] Discharge    Electronically signed by: Yves Mcdonough, PT , MSPT    Note: If patient does not return for scheduled/ recommended follow up visits, this note will serve as a discharge from care along with most recent update on progress.

## 2023-01-26 DIAGNOSIS — E03.9 HYPOTHYROIDISM, UNSPECIFIED TYPE: ICD-10-CM

## 2023-01-26 RX ORDER — LEVOTHYROXINE SODIUM 0.05 MG/1
TABLET ORAL
Qty: 90 TABLET | Refills: 0 | Status: SHIPPED | OUTPATIENT
Start: 2023-01-26

## 2023-01-26 NOTE — TELEPHONE ENCOUNTER
Medication:   Requested Prescriptions     Pending Prescriptions Disp Refills    levothyroxine (SYNTHROID) 50 MCG tablet [Pharmacy Med Name: LEVOTHYROXINE 50 MCG TABLET] 90 tablet 3     Sig: TAKE 1 TABLET BY MOUTH EVERY DAY       Last Filled:      Patient Phone Number: [phone] (home) [phone] (work)    Last appt: 7/25/2022   Next appt: Visit date not found    Last Thyroid: 7/22/22  Lab Results   Component Value Date/Time    TSH 1.95 07/22/2022 08:28 AM    T4FREE 1.4 03/30/2017 11:21 AM    Y1TYEDZ 7.5 02/21/2014 09:49 AM

## 2023-01-27 ENCOUNTER — HOSPITAL ENCOUNTER (OUTPATIENT)
Dept: PHYSICAL THERAPY | Age: 53
Setting detail: THERAPIES SERIES
Discharge: HOME | End: 2023-01-27
Payer: COMMERCIAL

## 2023-01-27 PROCEDURE — 97112 NEUROMUSCULAR REEDUCATION: CPT

## 2023-01-27 PROCEDURE — 97110 THERAPEUTIC EXERCISES: CPT

## 2023-01-27 NOTE — FLOWSHEET NOTE
[facility] - Outpatient Physical Therapy  Phone: [phone]   Fax: [phone]    Physical Therapy Daily Treatment Note    Date:  2023     Patient Name:  Lise Ferrari    :  1970  MRN: [id number]  Medical Diagnosis:  Piriformis syndrome, left [G57.02]  Treatment Diagnosis: sacrum dysfunction  Insurance/Certification information:  PT Insurance Information: juan carlos  Physician Information:  Ashish Alfredo    Plan of care signed (Y/N): []  Yes [x]  No     Date of Patient follow up with Physician:      Progress Report: []  Yes PN  [x]  No     Date Range for reporting period:  Beginnin2022  PN: 23  Ending:     Progress report due (10 Rx/or 30 days whichever is less): 2/3/2023    Recertification due (POC duration/ or 90 days whichever is less): 3/5/23     Visit # Insurance Allowable Auth required? Date Range    40 []  Yes  []  No            Latex Allergy:  [x]NO      []YES  Preferred Language for Healthcare:   [x]English       []other:    Functional Scale:           Date assessed:  FOTO physical FS primary measure score = 38; risk adjusted = 57    FOTO physical FS primary measure score = 40     2023    PAIN LEVEL:  3/10 L glut, 5-8/10 L ankle/calf    SUBJECTIVE: patient reports her ankle pain is about the same.  She just got her orthotics ankle pain on L about the same but she stood for several hours on Saturday at an event and walked a great deal.  Made appointment with MD for next week for ankle and L ankle swelling.  Ordered OTC orthotics but not in yet.  OBJECTIVE:    : Postural alignment: R anterior innominate SI, L Pubic upslip, A3EIOXB/FRSL, L4ERSL, ALLEY sacral torsion  : FHL strength : L 3+/5 ; TP in FHL, FDL, Tibialis posterior  : Palpable pop/shift. She is unable to do a single leg HR on the left. (AROM plantar flexion L=40, R=55)  MMT: L-ankle DF=5, EV= 4, PF=3-, post tib= 4-     : PN: Posture: L Pubic upslip,  L5 NRLSR/ERSL, L4FRSL, ALLEY sacral torsion, L SI posterior innominate  LE MMT:   Right:  Left:  Hip Flexion:  Hip aBduction:  HKnee Extension:  Knee Flexion:   Ankle DF:   5/5  4+/5  Ankle PF:   4+/5  4/5  Ankle Inversion:  5/5  4/5  Ankle Eversion:   5/5  4-/5    1/3: E1CRGXQ/ERSL, L4FRSL, ALLEY sacral, L SI anterior   12/7: MMT prone hip ext: L=4-, R=4+ . Pt also has severe tightness in L gastroc  12/21: Sig TP's in R glut med/ R glut min      RESTRICTIONS/PRECAUTIONS:     Exercises/Interventions:     Therapeutic Exercises (49773) X8' Resistance / level Sets/time Reps Notes   Recumbent bike     HEP instruction   1/16/23 see below   IB calf stretch  HR-TR Gastroc and soleus 30\"    2 2       HSS on step w/ rotation     HFS on step     Sciatic nerve glides supine  12/21 added to HEP and reviewed ways to do this in sitting    TA + DKTC feet on SB  1 15 1/23 ^ reps   TA + LTR feet on SB  1 15LR 1/23 ^ reps    Piriformis stretch w/ foot on SB     Bridge     SL bridge  1/9 added   Standing gastroc and soleus stretches 20\"  1/6: HEP   L ankle PRE's  PF, post tib, EV Green TB    Ankkle PF only 2 10 ea way 1/16 added   Active HSS supine w/ strap to maintain DF thru knee flex->ext  1/16 added   Composite Toe flexion AROM  1/20   Open books to R in L sidelying  1 10 1/23   Therapeutic Activities (65471)       1/6: Time spent reassessing patient's strength, goals, and flexibility. Reviewed patient's current functional status ans well as goals. Discussed PT progressand additional PT benefit.   Time spent writing PN and sent to MD --   1/6/23   FWD steps 4\" FWD step up /step down    1/27   Lateral Steps 4\" Lateral step up 1 10 L/R 1/27                                             Neuromuscular Re-ed (49785) X '       Hands?knees H rock with lumbar lordosis  Partial rock 1 10 1/23/2023: HEP   Quadruped Pelvic tilt   1/5\" 5 1/23: HEP       HEP   Quadruped B UE presses  1/3\" 10 1/23: HEP   Quadruped L/R unilateral GH flexion  1 10 1/23: HEP   Quadruped Multifidus lift with foam Airex under knee  1  1 8 R  3 L 1/23   Bent knee fall out  1 15 L/R 1/23: HEP   Standing Static Balance Floor, Airex: Feet apart   Feet together  Stagger stance L/R  10' 1/27: used int light B UE A and max vcs to avoid knee hyperextension   Wedge Fwd (B Gh flexion)  Bkwd 20\"  20\" 2 B  2 B 1/27   Standing B Resisted TRunk Ext Lime green 2 10 1/27   Standing B Resisted TRunk Rows Lime green 2 10 1/27   Shuttle        Standing Dynamic Foot taps Floor to 4\" step 1 20 1/27   Manual Intervention (26689)        STM   1/16/23 done after DN   MET correction 13'    MFR/ positional release     LAD    Manual  stretches L ankle PF/DF with gentle OP  1/16 done after DN   L Ankle/foot TC distractions Grade 3-4, Navicular A/P mobs Grade 3  1/9       Modalities: consider DN (Pt has read and signed acknowledgement)    12/21/22: Dry needling manual therapy (18'): consisted on the placement of 6 needles in the following muscles:  L-upper glut max, L glut med x 2, L-glut min, B L4-S1 multifidi  A  mm needle was inserted, piston, rotated, and coned to produce intramuscular mobilization. These techniques were used to restore functional range of motion, reduce muscle spasm and induce healing in the corresponding musculature. (38190)  Clean Technique was utilized today while applying Dry needling treatment. The treatment sites where cleaned with 70% solution of  isopropyl alcohol . The PT washed their hands and utilized treatment gloves along with hand  prior to inserting the needles. All needles where removed and discarded in the appropriate sharps container. MD has given verbal and/or written approval for this treatment. Attended low frequency (1-20Hz) electrical stimulation was utilized in conjunction with Dry Needling:  the Estim was manipulated between L4 & S1 multifidi  needles for a period of 10 min. at 2-3 volts.   The low frequency electrical stimulation was used to help reduce muscle spasm and help to interrupt /Pickens the pain cycle. (37170)      01/03/2023 Dry needling manual therapy (15'): consisted on the placement of 6 needles in the following muscles: , B L4-S1 multifidi  and B medial and lateral piriformis A  mm needle was inserted, piston, rotated, and coned to produce intramuscular mobilization. These techniques were used to restore functional range of motion, reduce muscle spasm and induce healing in the corresponding musculature. (78139)  Clean Technique was utilized today while applying Dry needling treatment. The treatment sites where cleaned with 70% solution of  isopropyl alcohol . The PT washed their hands and utilized treatment gloves along with hand  prior to inserting the needles. All needles where removed and discarded in the appropriate sharps container. MD has given verbal and/or written approval for this treatment. Attended low frequency (1-20Hz) electrical stimulation was utilized in conjunction with Dry Needling:  the Estim was manipulated between L4 & S1 multifidi  needles for a period of 10 min. at 2-3 volts. The low frequency electrical stimulation was used to help reduce muscle spasm and help to interrupt /Pickens the pain cycle. (60123)      1/6: Dry needling manual therapy: consisted on the placement of 1 needles in the following muscles:  L soleus,. A 75 mm needle was inserted, piston, rotated, and coned to produce intramuscular mobilization. These techniques were used to restore functional range of motion, reduce muscle spasm and induce healing in the corresponding musculature. (56475)  Clean Technique was utilized today while applying Dry needling treatment. The treatment sites where cleaned with 70% solution of  isopropyl alcohol . The PT washed their hands and utilized treatment gloves along with hand  prior to inserting the needles.   All needles where removed and discarded in the appropriate sharps container. 1/9/23: Dry needling manual therapy (20'): consisted on the placement of 7 needles in the following muscles:  L-upper glut max, L glut med, L piriformis x 2, L-glut min, L med gastroc, L med soleus. A  mm needle was inserted, piston, rotated, and coned to produce intramuscular mobilization. These techniques were used to restore functional range of motion, reduce muscle spasm and induce healing in the corresponding musculature. (94634)  Clean Technique was utilized today while applying Dry needling treatment. The treatment sites where cleaned with 70% solution of  isopropyl alcohol . The PT washed their hands and utilized treatment gloves along with hand  prior to inserting the needles. All needles where removed and discarded in the appropriate sharps container. MD has given verbal and/or written approval for this treatment. 1/16/22:  Dry needling manual therapy (17'): consisted on the placement of 7 needles in the following muscles:   L post tib, L FHL, A 50-60 mm needle was inserted, piston, rotated, and coned to produce intramuscular mobilization. These techniques were used to restore functional range of motion, reduce muscle spasm and induce healing in the corresponding musculature. (12465)  Clean Technique was utilized today while applying Dry needling treatment. The treatment sites where cleaned with 70% solution of  isopropyl alcohol . The PT washed their hands and utilized treatment gloves along with hand  prior to inserting the needles. All needles where removed and discarded in the appropriate sharps container. MD has given verbal and/or written approval for this treatment.      1/20:     Pt. Education:  12/5/2022  -patient educated on diagnosis, prognosis and expectations for rehab  -all patient questions were answered  - pt taught correct body mechanics to protect her LB, reviewed her HEP and she is to continue it    Home Exercise Program:  12/5: pirfiormis stretching, fig 4 stretching, [facility]   12/21: instructed in supine/seated sciatic nerve glides. Access Code: [email or web address]  URL: [email or web address]  Date: 01/06/2023  Prepared by: [facility] on [address] [place] - 1 x daily - 7 x weekly - 1 sets - 3 reps - 20-30 sec hold  Supine Bridge - 1 x daily - 7 x weekly - 1 sets - 3 reps - 20-30 sec hold    1/9/23: instructed in single leg stance with doming of the medial arch 2-3 sets of 10 without shoes on    Access Code: [email or web address]  URL: [email or web address]  Date: 01/16/2023  Prepared by: Fatmata Rubio  Exercises (pt issued lime TB and did 3 sets of 10 ea exercise in the clinic)  Ankle Plantar Flexion with Resistance (Mirrored) - 1 x daily - 5-7 x weekly - 3 sets - 10 reps  Long Sitting Ankle PNF D2 Plantarflexion with Resistance - 1 x daily - 5-7 x weekly - 3 sets - 10 reps  Seated Ankle Eversion with Resistance - 1 x daily - 5-7 x weekly - 3 sets - 10 reps  Supine Hamstring Stretch with Strap - 1 x daily - 7 x weekly - 3 sets - 10 reps    Access Code: [email or web address]  URL: Flowline/  Date: 01/23/2023  [facility]    Exercises  Bent Knee Fallouts - 1 x daily - 7 x weekly - 3 sets - 10 reps  Quadruped Rocking Backward - 1 x daily - 7 x weekly - 3 sets - 10 reps  Primal Push Up - 1 x daily - 7 x weekly - 1 sets - 10 reps  Quadruped Alternating Arm Lift - 1 x daily - 7 x weekly - 3 sets - 10 reps  Quadruped Hip Hike on Foam - 1 x daily - 7 x weekly - 1 sets - 10 reps  Standing 'L' Stretch at Counter - 1 x daily - 7 x weekly - 3 sets - 10 reps    Access Code: [email or web address]  URL: Flowline/  Date: 01/27/2023  Prepared by:  [facility]    Exercises  Scapular Retraction with Resistance - 1 x daily - 7 x weekly - 3 sets - 10 reps  Shoulder Extension with Resistance - 1 x daily - 7 x weekly - 3 sets - 10 reps  Half Tandem Stance Balance with Eyes Closed - 1 x daily - 7 x weekly - 1 sets - 3-5 reps - 10-15\" hold        Therapeutic Exercise and NMR EXR  [x] (95414) Provided verbal/tactile cueing for activities related to strengthening, flexibility, endurance, ROM for improvements in  [x] LE / Lumbar: LE, proximal hip, and core control with self care, mobility, lifting, ambulation. [] UE / Cervical: cervical, postural, scapular, scapulothoracic and UE control with self care, reaching, carrying, lifting, house/yardwork, driving, computer work.  [] (11375) Provided verbal/tactile cueing for activities related to improving balance, coordination, kinesthetic sense, posture, motor skill, proprioception to assist with   [] LE / lumbar: LE, proximal hip, and core control in self care, mobility, lifting, ambulation and eccentric single leg control. [] UE / cervical: cervical, scapular, scapulothoracic and UE control with self care, reaching, carrying, lifting, house/yardwork, driving, computer work.   [] (19158) Therapist is in constant attendance of 2 or more patients providing skilled therapy interventions, but not providing any significant amount of measurable one-on-one time to either patient, for improvements in  [] LE / lumbar: LE, proximal hip, and core control in self care, mobility, lifting, ambulation and eccentric single leg control. [] UE / cervical: cervical, scapular, scapulothoracic and UE control with self care, reaching, carrying, lifting, house/yardwork, driving, computer work.      NMR and Therapeutic Activities:    [x] (85081 or 93591) Provided verbal/tactile cueing for activities related to improving balance, coordination, kinesthetic sense, posture, motor skill, proprioception and motor activation to allow for proper function of   [x] LE: / Lumbar core, proximal hip and LE with self care and ADLs  [] UE / Cervical: cervical, postural, scapular, scapulothoracic and UE control with self care, carrying, lifting, driving, computer work.   [] (72050) Gait Re-education- Provided training and instruction to the patient for proper LE, core and proximal hip recruitment and positioning and eccentric body weight control with ambulation re-education including up and down stairs     Home Management Training / Self Care:  [] (41955) Provided self-care/home management training related to activities of daily living and compensatory training, and/or use of adaptive equipment for improvement with: ADLs and compensatory training, meal preparation, safety procedures and instruction in use of adaptive equipment, including bathing, grooming, dressing, personal hygiene, basic household cleaning and chores. Home Exercise Program:    [] (19007) Reviewed/Progressed HEP activities related to strengthening, flexibility, endurance, ROM of   [x] LE / Lumbar: core, proximal hip and LE for functional self-care, mobility, lifting and ambulation/stair navigation   [] UE / Cervical: cervical, postural, scapular, scapulothoracic and UE control with self care, reaching, carrying, lifting, house/yardwork, driving, computer work  [] (17729)Reviewed/Progressed HEP activities related to improving balance, coordination, kinesthetic sense, posture, motor skill, proprioception of   [] LE: core, proximal hip and LE for self care, mobility, lifting, and ambulation/stair navigation    [] UE / Cervical: cervical, postural,  scapular, scapulothoracic and UE control with self care, reaching, carrying, lifting, house/yardwork, driving, computer work    Manual Treatments:  PROM / STM / Oscillations-Mobs:  G-I, II, III, IV (PA's, Inf., Post.)  [x] (98689) Provided manual therapy to mobilize LE, proximal hip and/or LS spine soft tissue/joints for the purpose of modulating pain, promoting relaxation,  increasing ROM, reducing/eliminating soft tissue swelling/inflammation/restriction, improving soft tissue extensibility and allowing for proper ROM for normal function with   [] LE / lumbar: self care, mobility, lifting and ambulation.     [] UE / Cervical: self care, reaching, carrying, lifting, house/yardwork, driving, computer work. Modalities:  [] (45951) Vasopneumatic compression: Utilized vasopneumatic compression to decrease edema / swelling for the purpose of improving mobility and quad tone / recruitment which will allow for increased overall function including but not limited to self-care, transfers, ambulation, and ascending / descending stairs. Charges:  Timed Code Treatment Minutes: 54   Total Treatment Minutes: 54     [] EVAL - LOW (41087)   [] EVAL - MOD (65470)  [] EVAL - HIGH (93053)  [] RE-EVAL (19613)  [x] OT(21014) x   1    [] Ionto  [x] NMR (39692) x  2     [] Vaso  [x] Manual (95256) x  1  [] Ultrasound  [] TA x        [] Mech Traction (47798)  [] Aquatic Therapy x     [] ES (un) (43952):   [] Home Management Training x  [] ES(attended) (97516)   [] Dry Needling 1-2 muscles (95741):  [] Dry Needling 3+ muscles (21657)  [] Group:      [] Other:     GOALS:   Patient stated goal: walking and exercising with no pain  [] Progressing: [] Met: [] Not Met: [] Adjusted     Therapist goals for Patient:   Short Term Goals: To be achieved in: 2 weeks  1. Independent in HEP and progression per patient tolerance, in order to prevent re-injury. [] Progressing: [x] Met: [] Not Met: [] Adjusted  2. Patient will have a decrease in pain to facilitate improvement in movement, function, and ADLs as indicated by improvement with respect to Functional Deficits. [] Progressing: [x] Met: [] Not Met: [] Adjusted     Long Term Goals: To be achieved in: 6 weeks  1. FOTO functional survey score of >/= 61  to assist with reaching prior level of function. 48  [x] Progressing: [] Met: [x] Not Met: [] Adjusted  2. Patient will demonstrate increased AROM  to [facility], to allow for proper joint functioning to allow pt to resume walking  without increase in symptoms. [x] Progressing: [] Met: [x] Not Met: [] Adjusted  3.  Patient will demonstrate increased Strength and core activation to allow for proper functional mobility as indicated by patients Functional Deficits to allow pt to resume exercising without increase in symptoms. [x] Progressing: [] Met: [x] Not Met: [] Adjusted  4. Patient will return to functional activities including driving to work without increased symptoms or restriction. [] Progressing: [] Met: [x] Not Met: [] Adjusted    Overall Progression Towards Functional goals/ Treatment Progress Update:  [x] Patient is progressing as expected towards functional goals listed. [] Progression is slowed due to complexities/Impairments listed. [] Progression has been slowed due to co-morbidities. [] Plan just implemented, too soon to assess goals progression <30days   [] Goals require adjustment due to lack of progress  [] Patient is not progressing as expected and requires additional follow up with physician  [] Other    Persisting Functional Limitations/Impairments:  [x]Sleeping []Sitting               [x]Standing []Transfers        [x]Walking []Kneeling               [x]Stairs [x]Squatting / bending   [x]ADLs [x]Reaching  [x]Lifting  []Housework  [x]Driving []Job related tasks  []Sports/Recreation []Other:        ASSESSMENT:  Today's treatment focused on correction of stiffness/Lumbosacral postural malalignments initially followed with activation of core lower abdominals/multifidi at low levels due to weakness. fatigue. Will have OTC orthotics in this week for external support of ankle,   Severe weakness in core/B multifidi and 2-/5 on L contributing to decreased stability in Lumbar spine and weakness in L4/L5 myotomes exacerbating L posterior ti/FHL tendons. Continue to progress and do reassessment in next 1-2 sessions. .Continue PT.   Treatment/Activity Tolerance:  [x] Patient able to complete tx [] Patient limited by fatigue  [] Patient limited by pain  [] Patient limited by other medical complications  [] Other:     Prognosis: [x] Good [] Fair  [] Poor    Patient Requires Follow-up: [x] Yes  [] No    Plan for next treatment session: continue addressing the myofascial tightness. Check for spinal and SIJ alignment and motion in flexion. Continue DN 1x/wk. Work on core strength/ L glut activation, posterior tib strength L ankle and include roller stick/STM next session. PLAN: See eval. PT 2x / week for 6 weeks. [x] Continue per plan of care [] Alter current plan (see comments)  [] Plan of care initiated [] Hold pending MD visit [] Discharge    Electronically signed by: Richardson Woods PT , MSPT    Note: If patient does not return for scheduled/ recommended follow up visits, this note will serve as a discharge from care along with most recent update on progress.

## 2023-01-30 ENCOUNTER — OFFICE VISIT (OUTPATIENT)
Dept: ORTHOPEDIC SURGERY | Age: 53
End: 2023-01-30

## 2023-01-30 ENCOUNTER — APPOINTMENT (OUTPATIENT)
Dept: PHYSICAL THERAPY | Age: 53
End: 2023-01-30
Payer: COMMERCIAL

## 2023-01-30 VITALS — HEIGHT: 64 IN | BODY MASS INDEX: 42.68 KG/M2 | WEIGHT: 250 LBS

## 2023-01-30 DIAGNOSIS — G57.02 PIRIFORMIS SYNDROME, LEFT: ICD-10-CM

## 2023-01-30 DIAGNOSIS — G89.29 CHRONIC PAIN OF LEFT ANKLE: ICD-10-CM

## 2023-01-30 DIAGNOSIS — M25.572 CHRONIC PAIN OF LEFT ANKLE: ICD-10-CM

## 2023-01-30 DIAGNOSIS — M48.061 DEGENERATIVE LUMBAR SPINAL STENOSIS: ICD-10-CM

## 2023-01-30 DIAGNOSIS — M19.072 PRIMARY OSTEOARTHRITIS OF LEFT ANKLE: ICD-10-CM

## 2023-01-30 DIAGNOSIS — M51.36 DDD (DEGENERATIVE DISC DISEASE), LUMBAR: ICD-10-CM

## 2023-01-30 PROCEDURE — 97530 THERAPEUTIC ACTIVITIES: CPT

## 2023-01-30 PROCEDURE — 97110 THERAPEUTIC EXERCISES: CPT

## 2023-01-30 PROCEDURE — 97112 NEUROMUSCULAR REEDUCATION: CPT

## 2023-01-30 NOTE — PROGRESS NOTES
Chief Complaint:   Chief Complaint   Patient presents with    Lower Back Pain     F/U Lumbar/ piriformis. Low back and piriformis are feeling really good today. L leg/ankle is n a lot of pain though. L ankle has swelling, with achy and stabbing pain. Increase of pain when standing. History of Present Illness:       Patient is a 50 y.o. female returns follow up for the above complaint. The patient was last seen approximately 2 monthsago. The symptoms are improving since the last visit as relates to the ultrasound-guided piriformis injection and pseudo sciatica. The patient has had no further testing for the problem. She has noted therapeutic benefit from PT    Pain levels:0 with respect to low back and symptoms of left-sided pseudo sciatica have completely resolved. The patient denies progressive weakness of the lower extremities. The patient denies new onset bowel or bladder dysfunction. She continues on medical pain management as per previous  inclusive of gabapentin    Her main complaint relates to her left ankle. The pain localizes to the Medial aspect and  is predictably aggravated by weightbearing. There are not mechanical symptoms associated with the symptoms. There are notneuritic symptoms involving the foot or ankle. The patient denies subjective instability about the foot or ankle and admits to new onset or progressive weakness of the lower extremity. Pain level 5    The patient admits to a pattern of activity related swelling. Treatment to date:Bracing functional ankle brace with improvement    There is prior history of foot or ankle trauma related to closed ankle fracture remote past without sequelae    There is no prior history of autoimmune disease, crystal arthropathy, or crystal arthropathy.       Past Medical History:        Past Medical History:   Diagnosis Date    Allergy-induced asthma 2/25/2014    AR (allergic rhinitis)     Eczema     Morbid obesity with BMI of 40.0-44.9, adult ([facility] [address]     PCO (polycystic ovaries)     Unspecified hypothyroidism         Present Medications:         Current Outpatient Medications   Medication Sig Dispense Refill    meloxicam (MOBIC) 15 MG tablet Take 1 tablet by mouth daily 30 tablet 2    levothyroxine (SYNTHROID) 50 MCG tablet TAKE 1 TABLET BY MOUTH EVERY DAY 90 tablet 0    albuterol sulfate HFA (VENTOLIN HFA) 108 (90 Base) MCG/ACT inhaler Inhale 2 puffs into the lungs every 6 hours as needed for Wheezing 1 each 3    Azelaic Acid (FINACEA) 15 % GEL Apply to affected area daily 50 g 5    triamcinolone (KENALOG) 0.1 % ointment Apply to affected area BID PRN flares 80 g 2    diclofenac (VOLTAREN) 50 MG EC tablet Take 1 tablet by mouth 2 times daily 60 tablet 1    tiZANidine (ZANAFLEX) 4 MG tablet Take 1 tablet by mouth 3 times daily as needed (Muscle tightness/spasm) 30 tablet 1    budesonide-formoterol (SYMBICORT) 160-4.5 MCG/ACT AERO INHALE 2 PUFFS BY MOUTH TWICE DAILY 10.2 g 3    fluorouracil (EFUDEX) 5 % cream Apply topically 2 times daily x 2-3 weeks. Avoid the eyes. (Patient taking differently: Apply topically 2 times daily x 2-3 weeks. Avoid the eyes. ) 40 g 0    Vitamin D (CHOLECALCIFEROL) 1000 UNITS CAPS capsule Take 1,000 Units by mouth daily. cetirizine (ZYRTEC) 10 MG tablet Take 10 mg by mouth daily. therapeutic multivitamin-minerals (THERAGRAN-M) tablet Take 1 tablet by mouth daily. No current facility-administered medications for this visit. Allergies: Allergies   Allergen Reactions    Bactrim     Vicodin [Hydrocodone-Acetaminophen]            Review of Systems:    Pertinent items are noted in HPI  10 point review of systems negative except as mentioned in HPI     Vital Signs: There were no vitals filed for this visit. General Exam:     Constitutional: Patient is adequately groomed with no evidence of malnutrition  Mental Status: The patient is oriented to time, place and person.   The patient's mood and affect are appropriate. Vascular: Examination reveals no swelling or calf tenderness. Peripheral pulses are palpable and 2+. Lymphatics: no lymphadenopathy of the inguinal region or lower extremity     Physical Exam: left ankle      Primary Exam:    Inspection: Mild pes planus bilaterally no appreciable effusion or atrophy      Palpation: Mild tenderness over the anterior joint line, focal tenderness over the PTT      Range of Motion: Mild global decreased      Strength: Normal with low-grade discomfort with eversion and inversion      Special Tests: Single-leg stance positive for pain, anterior drawer talar tilt stable, subtalar joint motion good      Skin: There are no rashes, ulcerations or lesions. Gait: Mildly antalgic      Reflex intact lower     Additional Comments:        Additional Examinations:           Right Lower Extremity: Examination of the right lower extremity does not show any tenderness, deformity or injury. Range of motion is unremarkable. There is no gross instability. There are no rashes, ulcerations or lesions. Strength and tone are normal.     Lumbar spine examination:  No deformity atrophy appreciable curvature  Range of motion 90/35 without pain  SLR negative bilaterally    Office Imaging Results/Procedures PerformedToday:      Radiology:      X-rays obtained and reviewed in office:   Views 3 views left ankle   Location left ankle   Impression end-stage osteoarthritis affecting the talocrural joint with subchondral sclerosis affecting the talus greater than tibial articular surface. There is degenerative spurring involving the anterior and posterior tibiotalar articulations evident on the lateral projection with hypertrophic spurring. Pes planus foot morphology and suggestion of at least moderate subtalar joint arthropathy with sclerosis at the angle of gissane.   Ankle mortise is anatomic       Office Procedures:     Orders Placed This Encounter   Procedures XR ANKLE LEFT (MIN 3 VIEWS)     Standing Status:   Future     Number of Occurrences:   1     Standing Expiration Date:   1/30/2024     Order Specific Question:   Reason for exam:     Answer:   pain    MRI ANKLE LEFT WO CONTRAST     Standing Status:   Future     Standing Expiration Date:   1/30/2024     Scheduling Instructions:      [employer] , please contact pt to schedule,       Jennie will obtain auth and fwd to your facility. Pt advised to f/u in clinic 2-3 days after MRI for results. Order Specific Question:   Reason for exam:     Answer:   R/O PTT TEAR OR SUBCONDRAL TALUS FX     Order Specific Question:   What is the sedation requirement? Answer:   None    Airselect Tall Pneumatic Walking Boot     Patient was prescribed a [employer]. The left ANKLE will require stabilization / immobilization from this semi-rigid / rigid orthosis to improve their function. The orthosis will assist in protecting the affected area, provide functional support and facilitate healing. Patient was instructed to progress ambulation weight bearing as tolerated in the device. The patient was educated and fit by a healthcare professional with expert knowledge and specialization in brace application while under the direct supervision of the physician. Verbal and written instructions for the use of and application of this item were provided. They were instructed to contact the office immediately should the brace result in increased pain, decreased sensation, increased swelling or worsening of the condition. Other Outside Imaging and Testing Personally Reviewed:    XR ANKLE LEFT (MIN 3 VIEWS)    Result Date: 1/30/2023  Radiology exam is complete. No Radiologist dictation. Please follow up with ordering provider. Assessment   Impression: . Encounter Diagnoses   Name Primary?     Primary osteoarthritis of left ankle     Piriformis syndrome, left     Degenerative lumbar spinal stenosis     DDD (degenerative disc disease), lumbar     Chronic pain of left ankle               Plan:     Brace boot mmobilization left ankle brace boot full weightbearing as tolerated  Meloxicam daily with GI precaution  Wean off gabapentin every other day x5 doses  Achilles tendon stretching and foot intrinsics  MRI evaluation of the left ankle evaluate for AVN of the talus/high-grade stress edema/bone marrow edema lesion of the talus  Maximize aggressive conservative measures and consider AFO fabrication  Intra-articular biologic orthopedic injection and/or intraosseous injection pending results of MRI  Continue maintenance I HEP with respect to lumbar spine and piriformis      Orders:        Orders Placed This Encounter   Procedures    XR ANKLE LEFT (MIN 3 VIEWS)     Standing Status:   Future     Number of Occurrences:   1     Standing Expiration Date:   1/30/2024     Order Specific Question:   Reason for exam:     Answer:   pain    MRI ANKLE LEFT WO CONTRAST     Standing Status:   Future     Standing Expiration Date:   1/30/2024     Scheduling Instructions:      [employer] , please contact pt to schedule,       Jennie will obtain auth and fwd to your facility. Pt advised to f/u in clinic 2-3 days after MRI for results. Order Specific Question:   Reason for exam:     Answer:   R/O PTT TEAR OR SUBCONDRAL TALUS FX     Order Specific Question:   What is the sedation requirement? Answer:   None    Airselect Tall Pneumatic Walking Boot     Patient was prescribed a [employer]. The left ANKLE will require stabilization / immobilization from this semi-rigid / rigid orthosis to improve their function. The orthosis will assist in protecting the affected area, provide functional support and facilitate healing. Patient was instructed to progress ambulation weight bearing as tolerated in the device.      The patient was educated and fit by a healthcare professional with expert knowledge and specialization in brace application while under the direct supervision of the physician. Verbal and written instructions for the use of and application of this item were provided. They were instructed to contact the office immediately should the brace result in increased pain, decreased sensation, increased swelling or worsening of the condition. Paloma Cochran MD.      Disclaimer: \"This note was dictated with voice recognition software. Though review and correction are routine, we apologize for any errors. \"

## 2023-01-30 NOTE — LETTER
[address] [place] [address]  Phone: [phone]  Fax: [phone]    Gayathri Lamas MD    January 31, 2023     Hemanth Fam MD  [place] [address] [address]    Patient: Prabhakar Murphy   MR Number: [id number]   YOB: 1970   Date of Visit: 1/30/2023       Dear Hemanth Fam:    Thank you for referring Prabhakar Murphy to me for evaluation/treatment. Below are the relevant portions of my assessment and plan of care. Impression: . Encounter Diagnoses   Name Primary?  Primary osteoarthritis of left ankle     Piriformis syndrome, left     Degenerative lumbar spinal stenosis     DDD (degenerative disc disease), lumbar     Chronic pain of left ankle               Plan:     Brace boot mmobilization left ankle brace boot full weightbearing as tolerated  Meloxicam daily with GI precaution  Wean off gabapentin every other day x5 doses  Achilles tendon stretching and foot intrinsics  MRI evaluation of the left ankle evaluate for AVN of the talus/high-grade stress edema/bone marrow edema lesion of the talus  Maximize aggressive conservative measures and consider AFO fabrication  Intra-articular biologic orthopedic injection and/or intraosseous injection pending results of MRI  Continue maintenance I HEP with respect to lumbar spine and piriformis      Orders:        Orders Placed This Encounter   Procedures    XR ANKLE LEFT (MIN 3 VIEWS)     Standing Status:   Future     Number of Occurrences:   1     Standing Expiration Date:   1/30/2024     Order Specific Question:   Reason for exam:     Answer:   pain    MRI ANKLE LEFT WO CONTRAST     Standing Status:   Future     Standing Expiration Date:   1/30/2024     Scheduling Instructions:      [employer] , please contact pt to schedule,       Jennie will obtain auth and fwd to your facility.       Pt advised to f/u in clinic 2-3 days after MRI for results. Order Specific Question:   Reason for exam:     Answer:   R/O PTT TEAR OR SUBCONDRAL TALUS FX     Order Specific Question:   What is the sedation requirement? Answer:   None    Airselect Tall Pneumatic Walking Boot     Patient was prescribed a [employer]. The left ANKLE will require stabilization / immobilization from this semi-rigid / rigid orthosis to improve their function. The orthosis will assist in protecting the affected area, provide functional support and facilitate healing. Patient was instructed to progress ambulation weight bearing as tolerated in the device. The patient was educated and fit by a healthcare professional with expert knowledge and specialization in brace application while under the direct supervision of the physician. Verbal and written instructions for the use of and application of this item were provided. They were instructed to contact the office immediately should the brace result in increased pain, decreased sensation, increased swelling or worsening of the condition. Mia Cummings MD.      Disclaimer: \"This note was dictated with voice recognition software. Though review and correction are routine, we apologize for any errors. \"       If you have questions, please do not hesitate to call me. I look forward to following Milan Vasquez along with you.     Sincerely,      Paige Segura MD

## 2023-01-30 NOTE — PROGRESS NOTES
[address] [facility]  Phone: [phone]   Fax: [phone]    Physical Therapy Re-Certification Plan of Care    Dear Phillip Segal*  ,    We had the pleasure of treating the following patient for physical therapy services at [facility]. A summary of our findings can be found in the updated assessment below. This includes our plan of care. If you have any questions or concerns regarding these findings, please do not hesitate to contact me at the office phone number checked above. Thank you for the referral.     Physician Signature:________________________________Date:__________________  By signing above (or electronic signature), therapist's plan is approved by physician      Functional Outcome:     FOTO:52    Overall Response to Treatment:   []Patient is responding well to treatment and improvement is noted with regards  to goals   []Patient should continue to improve in reasonable time if they continue HEP   []Patient has plateaued and is no longer responding to skilled PT intervention    []Patient is getting worse and would benefit from return to referring MD   []Patient unable to adhere to initial POC   [x]Other:Patient has been seen for 12 visits in pT and states she does feel stronger in her back and hips but that her L ankle/foot pain continues to remain unchanged despite strengthening exercises/manual PT/balance exercises/DN. Patient saw ortho MD this date with X-rayed her L ankle/foot complex which showed severe OA in ankle. MD also is ordering MRI of L ankle/foot complex. MD wants no PT for now due to complications from L foot/ankle and having to be immobilized in boot. Educated patient about obtaining \"even-up \"hoe for R LE to maintain level pelvis while in walking boot. Patient is formally discharged  with a final HEP for hip/core activation/strengthening.       Date range of Visits: eval-23  Total Visits: 12    Recommendation:    [x]Discharge from PT              []Hold PT, pending MD visit    Physical Therapy Daily Treatment Note    Date:  2023     Patient Name:  Calixto Anguiano    :  1970  MRN: [id number]  Medical Diagnosis:  Piriformis syndrome, left [G57.02]  Treatment Diagnosis: sacrum dysfunction  Insurance/Certification information:  PT Insurance Information: anth  Physician Information:  Carlos Hernandez[address] signed (Y/N): [x]  Yes []  No     Date of Patient follow up with Physician:      Progress Report: [x]  Yes PN; discharge  []  No     Date Range for reporting period:  Beginnin2022  PN: 23  Ending:     Progress report due (10 Rx/or 30 days whichever is less): 2026    Recertification due (POC duration/ or 90 days whichever is less): 3/5/23     Visit # Insurance Allowable Auth required? Date Range    40 []  Yes  []  No            Latex Allergy:  [x]NO      []YES  Preferred Language for Healthcare:   [x]English       []other:    Functional Scale:           Date assessed:  FOTO physical FS primary measure score = 38; risk adjusted = 57    FOTO physical FS primary measure score = 40     2023  FOTO physical FS primary measure score = 52     2023  PAIN LEVEL:  3/10 L glut, 5-8/10 L ankle/calf    SUBJECTIVE: patient reports her ankle pain is about the same. She just got her orthotics ankle pain on L about the same but she stood for several hours on Saturday at an event and walked a great deal.  Made appointment with MD for next week for ankle and L ankle swelling. Ordered OTC orthotics but not in yet.   OBJECTIVE:    : PN/Discharge     PN: Posture: L Pubic upslip, L5 NRLSR/ERSL, L4FRSL, ALLEY sacral torsion, L SI posterior innominate  LE MMT:   Right:  Left:  Hip Flexion:  Hip aBduction:  HKnee Extension:  Knee Flexion:         RESTRICTIONS/PRECAUTIONS:     Exercises/Interventions:     Therapeutic Exercises (43388) X12'' Resistance / level Sets/time Reps Notes    Nustep L3 5'     HEP instruction   1/16/23 see below   IB calf stretch  HR-TR Gastroc and soleus 30\"    2 2    12 DL    HSS on step w/ rotation     HFS on step     Sciatic nerve glides supine  12/21 added to HEP and reviewed ways to do this in sitting    TA + DKTC feet on SB  1 15 1/23 ^ reps   TA + LTR feet on SB  1 15LR 1/23 ^ reps    Piriformis stretch w/ foot on SB     Bridge     SL bridge  1/9 added   Standing gastroc and soleus stretches 20\"  1/6: HEP   L ankle PRE's  PF, post tib, EV Green TB    Ankkle PF only 2 10 ea way 1/16 added   Active HSS supine w/ strap to maintain DF thru knee flex->ext  1/16 added   Composite Toe flexion AROM  1/20   Open books to R in L sidelying  1 10 1/23   Therapeutic Activities (41205)       1/6: Time spent reassessing patient's strength, goals, and flexibility. Reviewed patient's current functional status ans well as goals. Discussed PT progressand additional PT benefit. Time spent writing PN and sent to MD --   1/6/23   FWD steps 4\" FWD step up /step down    1/27: NMR   Lateral Steps 4\" Lateral step up 1/27: NMR   1/30: Time spent reassessing patient's strength and flexibility. Addressed progressin PT, need for final HEP for core/hip strengthening, and need for even up shoe to level her pelvis while in boot.   Wrote PN and sent to MD. Ann Marie Lamb'                                         Neuromuscular Re-ed (14923) X 20'       Hands?knees H rock with lumbar lordosis  Partial rock 1 10 1/23/2023: HEP   Quadruped Pelvic tilt   1/5\" 5 1/23: HEP       HEP   Quadruped B UE presses  1/3\" 10 1/23: HEP   Quadruped L/R unilateral GH flexion  1 10 1/23: HEP   Quadruped Multifidus lift with foam Airex under knee  1  1 8 R  3 L 1/23   Bent knee fall out  1 15 L/R 1/23: HEP   Standing Static Balance Floor, Airex: Feet apart   Feet together  Stagger stance L/R Wedge Fwd (B Gh flexion)  Bkwd Standing B Resisted TRunk Ext Lime green Standing B Resisted TRunk Rows Lime green Shuttle   1/27: with B UE initially    Standing Dynamic Foot taps Floor to 4\" step 1/27   Manual Intervention (44766)        STM   1/16/23 done after DN   MET correction '    MFR/ positional release     LAD    Manual  stretches L ankle PF/DF with gentle OP  1/16 done after DN   L Ankle/foot TC distractions Grade 3-4, Navicular A/P mobs Grade 3  1/9       Modalities: consider DN (Pt has read and signed acknowledgement)    12/21/22: Dry needling manual therapy (18'): consisted on the placement of 6 needles in the following muscles:  L-upper glut max, L glut med x 2, L-glut min, B L4-S1 multifidi  A  mm needle was inserted, piston, rotated, and coned to produce intramuscular mobilization. These techniques were used to restore functional range of motion, reduce muscle spasm and induce healing in the corresponding musculature. (02293)  Clean Technique was utilized today while applying Dry needling treatment. The treatment sites where cleaned with 70% solution of  isopropyl alcohol . The PT washed their hands and utilized treatment gloves along with hand  prior to inserting the needles. All needles where removed and discarded in the appropriate sharps container. MD has given verbal and/or written approval for this treatment. Attended low frequency (1-20Hz) electrical stimulation was utilized in conjunction with Dry Needling:  the Estim was manipulated between L4 & S1 multifidi  needles for a period of 10 min. at 2-3 volts. The low frequency electrical stimulation was used to help reduce muscle spasm and help to interrupt /Poca the pain cycle. (59280)      01/03/2023 Dry needling manual therapy (15'): consisted on the placement of 6 needles in the following muscles: , B L4-S1 multifidi  and B medial and lateral piriformis A  mm needle was inserted, piston, rotated, and coned to produce intramuscular mobilization.   These techniques were used to restore functional range of motion, reduce muscle spasm and induce healing in the corresponding musculature. (33225)  Clean Technique was utilized today while applying Dry needling treatment. The treatment sites where cleaned with 70% solution of  isopropyl alcohol . The PT washed their hands and utilized treatment gloves along with hand  prior to inserting the needles. All needles where removed and discarded in the appropriate sharps container. MD has given verbal and/or written approval for this treatment. Attended low frequency (1-20Hz) electrical stimulation was utilized in conjunction with Dry Needling:  the Estim was manipulated between L4 & S1 multifidi  needles for a period of 10 min. at 2-3 volts. The low frequency electrical stimulation was used to help reduce muscle spasm and help to interrupt /Hilbert the pain cycle. (89811)      1/6: Dry needling manual therapy: consisted on the placement of 1 needles in the following muscles:  L soleus,. A 75 mm needle was inserted, piston, rotated, and coned to produce intramuscular mobilization. These techniques were used to restore functional range of motion, reduce muscle spasm and induce healing in the corresponding musculature. (04856)  Clean Technique was utilized today while applying Dry needling treatment. The treatment sites where cleaned with 70% solution of  isopropyl alcohol . The PT washed their hands and utilized treatment gloves along with hand  prior to inserting the needles. All needles where removed and discarded in the appropriate sharps container. 1/9/23: Dry needling manual therapy (20'): consisted on the placement of 7 needles in the following muscles:  L-upper glut max, L glut med, L piriformis x 2, L-glut min, L med gastroc, L med soleus. A  mm needle was inserted, piston, rotated, and coned to produce intramuscular mobilization.   These techniques were used to restore functional range of motion, reduce muscle spasm and induce healing in the corresponding musculature. (46888)  Clean Technique was utilized today while applying Dry needling treatment. The treatment sites where cleaned with 70% solution of  isopropyl alcohol . The PT washed their hands and utilized treatment gloves along with hand  prior to inserting the needles. All needles where removed and discarded in the appropriate sharps container. MD has given verbal and/or written approval for this treatment. 1/16/22:  Dry needling manual therapy (17'): consisted on the placement of 7 needles in the following muscles:   L post tib, L FHL, A 50-60 mm needle was inserted, piston, rotated, and coned to produce intramuscular mobilization. These techniques were used to restore functional range of motion, reduce muscle spasm and induce healing in the corresponding musculature. (35593)  Clean Technique was utilized today while applying Dry needling treatment. The treatment sites where cleaned with 70% solution of  isopropyl alcohol . The PT washed their hands and utilized treatment gloves along with hand  prior to inserting the needles. All needles where removed and discarded in the appropriate sharps container. MD has given verbal and/or written approval for this treatment. 1/20:     Pt. Education:  12/5/2022  -patient educated on diagnosis, prognosis and expectations for rehab  -all patient questions were answered  - pt taught correct body mechanics to protect her LB, reviewed her HEP and she is to continue it  1/30Educated patient about obtaining \"even-up \"hoe for R LE to maintain level pelvis while in walking boot:   Home Exercise Program:  12/5: briana stretching, fig 4 stretching, [facility]   12/21: instructed in supine/seated sciatic nerve glides. Access Code: [email or web address]  URL: [email or web address]  Date: 01/06/2023  Prepared by:  [facility] on Wall - 1 x daily - 7 x weekly - 1 sets - 3 reps - 20-30 sec hold  Supine Bridge - 1 x daily - 7 x weekly - 1 sets - 3 reps - 20-30 sec hold    1/9/23: instructed in single leg stance with doming of the medial arch 2-3 sets of 10 without shoes on    Access Code: [email or web address]  URL: [email or web address]  Date: 01/16/2023  Prepared by: Fatmata Rubio  Exercises (pt issued lime TB and did 3 sets of 10 ea exercise in the clinic)  Ankle Plantar Flexion with Resistance (Mirrored) - 1 x daily - 5-7 x weekly - 3 sets - 10 reps  Long Sitting Ankle PNF D2 Plantarflexion with Resistance - 1 x daily - 5-7 x weekly - 3 sets - 10 reps  Supine Hamstring Stretch with Strap - 1 x daily - 7 x weekly - 3 sets - 10 reps    Access Code: [email or web address]  URL: [employer]/  Date: 01/23/2023  Prepared by: [facility]    Exercises  Bent Knee Fallouts - 1 x daily - 7 x weekly - 3 sets - 10 reps  Quadruped Rocking Backward - 1 x daily - 7 x weekly - 3 sets - 10 reps  Primal Push Up - 1 x daily - 7 x weekly - 1 sets - 10 reps  Quadruped Alternating Arm Lift - 1 x daily - 7 x weekly - 3 sets - 10 reps  Quadruped Hip Hike on Foam - 1 x daily - 7 x weekly - 1 sets - 10 reps  Standing 'L' Stretch at Counter - 1 x daily - 7 x weekly - 3 sets - 10 reps    Access Code: [email or web address]  URL: [email or web address]  Date: 01/27/2023  Prepared by: [facility]    Exercises  Scapular Retraction with Resistance - 1 x daily - 7 x weekly - 3 sets - 10 reps  Shoulder Extension with Resistance - 1 x daily - 7 x weekly - 3 sets - 10 reps  Half Tandem Stance Balance with Eyes Closed - 1 x daily - 7 x weekly - 1 sets - 3-5 reps - 10-15\" hold    Access Code: [email or web address]  URL: [email or web address]  Date: 01/30/2023  Prepared by:  [facility]    Exercises  Sidelying Hip Abduction - 1 x daily - 7 x weekly - 3 sets - 10 reps  Bent Knee Fallouts - 1 x daily - 7 x weekly - 3 sets - 10 reps  Hooklying Clamshell with Resistance - 1 x daily - 7 x weekly - 3 sets - 10 reps  Hooklying Isometric Clamshell - 1 x daily - 7 x weekly - 3 sets - 10 reps  Supine Posterior Pelvic Tilt - 1 x daily - 7 x weekly - 3 sets - 10 reps  Supine Shoulder Flexion Extension Full Range AROM - 1 x daily - 7 x weekly - 3 sets - 10 reps  Supine March with Posterior Pelvic Tilt - 1 x daily - 7 x weekly - 3 sets - 10 reps      Therapeutic Exercise and NMR EXR  [x] (58444) Provided verbal/tactile cueing for activities related to strengthening, flexibility, endurance, ROM for improvements in  [x] LE / Lumbar: LE, proximal hip, and core control with self care, mobility, lifting, ambulation. [] UE / Cervical: cervical, postural, scapular, scapulothoracic and UE control with self care, reaching, carrying, lifting, house/yardwork, driving, computer work.  [] (33351) Provided verbal/tactile cueing for activities related to improving balance, coordination, kinesthetic sense, posture, motor skill, proprioception to assist with   [] LE / lumbar: LE, proximal hip, and core control in self care, mobility, lifting, ambulation and eccentric single leg control. [] UE / cervical: cervical, scapular, scapulothoracic and UE control with self care, reaching, carrying, lifting, house/yardwork, driving, computer work.   [] (28989) Therapist is in constant attendance of 2 or more patients providing skilled therapy interventions, but not providing any significant amount of measurable one-on-one time to either patient, for improvements in  [] LE / lumbar: LE, proximal hip, and core control in self care, mobility, lifting, ambulation and eccentric single leg control. [] UE / cervical: cervical, scapular, scapulothoracic and UE control with self care, reaching, carrying, lifting, house/yardwork, driving, computer work.      NMR and Therapeutic Activities:    [x] (99828 or 71179) Provided verbal/tactile cueing for activities related to improving balance, coordination, kinesthetic sense, posture, motor skill, proprioception and motor activation to allow for proper function of   [x] LE: / Lumbar core, proximal hip and LE with self care and ADLs  [] UE / Cervical: cervical, postural, scapular, scapulothoracic and UE control with self care, carrying, lifting, driving, computer work.   [] (86013) Gait Re-education- Provided training and instruction to the patient for proper LE, core and proximal hip recruitment and positioning and eccentric body weight control with ambulation re-education including up and down stairs     Home Management Training / Self Care:  [] (15854) Provided self-care/home management training related to activities of daily living and compensatory training, and/or use of adaptive equipment for improvement with: ADLs and compensatory training, meal preparation, safety procedures and instruction in use of adaptive equipment, including bathing, grooming, dressing, personal hygiene, basic household cleaning and chores.      Home Exercise Program:    [] (83839) Reviewed/Progressed HEP activities related to strengthening, flexibility, endurance, ROM of   [x] LE / Lumbar: core, proximal hip and LE for functional self-care, mobility, lifting and ambulation/stair navigation   [] UE / Cervical: cervical, postural, scapular, scapulothoracic and UE control with self care, reaching, carrying, lifting, house/yardwork, driving, computer work  [] (38179)Reviewed/Progressed HEP activities related to improving balance, coordination, kinesthetic sense, posture, motor skill, proprioception of   [] LE: core, proximal hip and LE for self care, mobility, lifting, and ambulation/stair navigation    [] UE / Cervical: cervical, postural,  scapular, scapulothoracic and UE control with self care, reaching, carrying, lifting, house/yardwork, driving, computer work    Manual Treatments:  PROM / STM / Oscillations-Mobs:  G-I, II, III, IV (PA's, Inf., Post.)  [x] (12523) Provided manual therapy to mobilize LE, proximal hip and/or LS spine soft tissue/joints for the purpose of modulating pain, promoting relaxation,  increasing ROM, reducing/eliminating soft tissue swelling/inflammation/restriction, improving soft tissue extensibility and allowing for proper ROM for normal function with   [] LE / lumbar: self care, mobility, lifting and ambulation. [] UE / Cervical: self care, reaching, carrying, lifting, house/yardwork, driving, computer work. Modalities:  [] (29937) Vasopneumatic compression: Utilized vasopneumatic compression to decrease edema / swelling for the purpose of improving mobility and quad tone / recruitment which will allow for increased overall function including but not limited to self-care, transfers, ambulation, and ascending / descending stairs. Charges:  Timed Code Treatment Minutes: 32   Total Treatment Minutes: 32     [] EVAL - LOW (87144)   [] EVAL - MOD (88052)  [] EVAL - HIGH (00062)  [] RE-EVAL (40674)  [] KF(75890) x   1    [] Ionto  [x] NMR (23848) x  1    [] Vaso  [] Manual (08297) x    [] Ultrasound  [x] TA x  1      [] Mech Traction (67487)  [] Aquatic Therapy x     [] ES (un) (29675):   [] Home Management Training x  [] ES(attended) (23712)   [] Dry Needling 1-2 muscles (67669):  [] Dry Needling 3+ muscles (22351)  [] Group:      [] Other:     GOALS:   Patient stated goal: walking and exercising with no pain  [] Progressing: [] Met: [x] Not Met: [] Adjusted     Therapist goals for Patient:   Short Term Goals: To be achieved in: 2 weeks  1. Independent in HEP and progression per patient tolerance, in order to prevent re-injury. [] Progressing: [x] Met: [] Not Met: [] Adjusted  2. Patient will have a decrease in pain to facilitate improvement in movement, function, and ADLs as indicated by improvement with respect to Functional Deficits. [] Progressing: [x] Met: [] Not Met: [] Adjusted     Long Term Goals: To be achieved in: 6 weeks  1. FOTO functional survey score of >/= 61  to assist with reaching prior level of function.  50  [x] Progressing: [] Met: [x] Not Met: [] Adjusted  2. Patient will demonstrate increased AROM  to [facility], to allow for proper joint functioning to allow pt to resume walking  without increase in symptoms. MET for back only  [] Progressing: [x] Met: [] Not Met: [] Adjusted  3. Patient will demonstrate increased Strength and core activation to allow for proper functional mobility as indicated by patients Functional Deficits to allow pt to resume exercising without increase in symptoms. MET for back only  [] Progressing: [x] Met: [] Not Met: [] Adjusted  4. Patient will return to functional activities including driving to work without increased symptoms or restriction. [] Progressing: [] Met: [x] Not Met: [] Adjusted    Overall Progression Towards Functional goals/ Treatment Progress Update:  [] Patient is progressing as expected towards functional goals listed. [] Progression is slowed due to complexities/Impairments listed. [] Progression has been slowed due to co-morbidities.   [] Plan just implemented, too soon to assess goals progression <30days   [] Goals require adjustment due to lack of progress  [] Patient is not progressing as expected and requires additional follow up with physician  [x] Other:complications from L foot/ankle and having to be immobilized in boot; MD wants no PT or now and will discharge with a final HEP for hip/core activation/strengthening    Persisting Functional Limitations/Impairments:  [x]Sleeping []Sitting               [x]Standing []Transfers        [x]Walking []Kneeling               [x]Stairs [x]Squatting / bending   [x]ADLs [x]Reaching  [x]Lifting  []Housework  [x]Driving []Job related tasks  []Sports/Recreation []Other:        ASSESSMENT:  see comments above  Treatment/Activity Tolerance:  [x] Patient able to complete tx [] Patient limited by fatigue  [] Patient limited by pain  [x] Patient limited by other medical complications; L foot/ankle in boot now since seeing MD today  [] Other:     Prognosis: [x] Good [] Fair  [] Poor    Patient Requires Follow-up: [] Yes  [x] No    PLAN: See eval. PT 2x / week for 6 weeks.   [] Continue per plan of care [] Alter current plan (see comments)  [] Plan of care initiated [] Hold pending MD visit [x] Discharge    Electronically signed by: HOLLIE NEVES PT , MSPT    Note: If patient does not return for scheduled/ recommended follow up visits, this note will serve as a discharge from care along with most recent update on progress.

## 2023-01-30 NOTE — LETTER
[address], [facility] [address]  Phone: [phone]  Fax: [phone]    Roro Gill MD         January 30, 2023     Patient: Neha Mendoza   YOB: 1970   Date of Visit: 1/30/2023       To Whom It May Concern: It is my medical opinion that Neha Mendoza requires a disability parking placard for the following reasons:  She cannot walk 200 feet without stopping to rest.  Duration of need: 1 year    If you have any questions or concerns, please don't hesitate to call.     Sincerely,      Oscar Morales MD.      Roro Gill MD

## 2023-01-31 RX ORDER — MELOXICAM 15 MG/1
15 TABLET ORAL DAILY
Qty: 30 TABLET | Refills: 2 | Status: SHIPPED | OUTPATIENT
Start: 2023-01-31

## 2023-02-13 ENCOUNTER — OFFICE VISIT (OUTPATIENT)
Dept: ORTHOPEDIC SURGERY | Age: 53
End: 2023-02-13
Payer: COMMERCIAL

## 2023-02-13 VITALS — HEIGHT: 64 IN | WEIGHT: 250 LBS | BODY MASS INDEX: 42.68 KG/M2

## 2023-02-13 DIAGNOSIS — E66.01 MORBID OBESITY WITH BMI OF 40.0-44.9, ADULT (HCC): ICD-10-CM

## 2023-02-13 DIAGNOSIS — M19.072 PRIMARY OSTEOARTHRITIS OF LEFT ANKLE: Primary | ICD-10-CM

## 2023-02-13 PROCEDURE — 99214 OFFICE O/P EST MOD 30 MIN: CPT | Performed by: INTERNAL MEDICINE

## 2023-02-13 NOTE — PROGRESS NOTES
Chief Complaint:   Chief Complaint   Patient presents with    Ankle Pain     F/U MRI TR L ANKLE, feels better in the boot. Has some soreness at the end of the day. History of Present Illness:       Patient is a 50 y.o. female returns follow up for the above complaint. The patient was last seen approximately 2 weeksago. The symptoms are improving since the last visit. The patient has had no further testing for the problem. MRI completed in the interim    She continues with placement immobilization with predictable pain relief. Pain levels 3-4/10 within the boot    She continues with meloxicam with benefit    No pattern of active related swelling    No new injuries no new events     Past Medical History:        Past Medical History:   Diagnosis Date    Allergy-induced asthma 2/25/2014    AR (allergic rhinitis)     Eczema     Morbid obesity with BMI of 40.0-44.9, adult (HCC)     PCO (polycystic ovaries)     Unspecified hypothyroidism         Present Medications:         Current Outpatient Medications   Medication Sig Dispense Refill    levothyroxine (SYNTHROID) 50 MCG tablet TAKE 1 TABLET BY MOUTH EVERY DAY 90 tablet 0    albuterol sulfate HFA (VENTOLIN HFA) 108 (90 Base) MCG/ACT inhaler Inhale 2 puffs into the lungs every 6 hours as needed for Wheezing 1 each 3    meloxicam (MOBIC) 15 MG tablet Take 1 tablet by mouth daily 30 tablet 2    Azelaic Acid (FINACEA) 15 % GEL Apply to affected area daily 50 g 5    triamcinolone (KENALOG) 0.1 % ointment Apply to affected area BID PRN flares 80 g 2    diclofenac (VOLTAREN) 50 MG EC tablet Take 1 tablet by mouth 2 times daily 60 tablet 1    tiZANidine (ZANAFLEX) 4 MG tablet Take 1 tablet by mouth 3 times daily as needed (Muscle tightness/spasm) 30 tablet 1    budesonide-formoterol (SYMBICORT) 160-4.5 MCG/ACT AERO INHALE 2 PUFFS BY MOUTH TWICE DAILY 10.2 g 3    fluorouracil (EFUDEX) 5 % cream Apply topically 2 times daily x 2-3 weeks. Avoid the eyes. (Patient taking differently: Apply topically 2 times daily x 2-3 weeks. Avoid the eyes. ) 40 g 0    Vitamin D (CHOLECALCIFEROL) 1000 UNITS CAPS capsule Take 1,000 Units by mouth daily. cetirizine (ZYRTEC) 10 MG tablet Take 10 mg by mouth daily. therapeutic multivitamin-minerals (THERAGRAN-M) tablet Take 1 tablet by mouth daily. No current facility-administered medications for this visit. Allergies: Allergies   Allergen Reactions    Bactrim     Vicodin [Hydrocodone-Acetaminophen]            Review of Systems:    Pertinent items are noted in HPI        Vital Signs: There were no vitals filed for this visit. General Exam:     Constitutional: Patient is adequately groomed with no evidence of malnutrition    Physical Exam: left ankle      Primary Exam:    Inspection: Negligible asymmetric soft tissue swelling about the ankle      Palpation: Mild tenderness over the medial talar neck      Range of Motion: Near normal subtalar joint, moderate restriction flexion extension without pain      Strength: Normal at the ankle      Special Tests: SLR negative      Skin: There are no rashes, ulcerations or lesions. Gait: Purposely not assessed unprotected     Neurovascular - non focal and intact       Additional Comments:        Additional Examinations:                    Office Imaging Results/Procedures PerformedToday:             Office Procedures:   No orders of the defined types were placed in this encounter. Other Outside Imaging and Testing Personally Reviewed:    MRI LOWER EXTREMITY W JT WO CONTRAST    Result Date: 2/10/2023  Site: [facility] #: [id number] #: [id number] Procedure: MR Left Ankle w/o Contrast ; Reason for Exam: Primary osteoarthritis, left ankle and foot This document is confidential medical information. Unauthorized disclosure or use of this information is prohibited by law. If you are not the intended recipient of this document, please advise us by calling immediately [phone]. [facility], [facility] Jin Meza Patient Name: Duwaine Collet Case ID: [id number] Patient : 1970 Referring Physician: Rodolfo Loo MD Exam Date: 2023 Exam Description: MR Left Ankle w/o Contrast HISTORY:  Female, 50year-old. Primary osteoarthritis, left ankle and foot. TECHNICAL FACTORS:  Long- and short-axis fat- and water-weighted images were performed. COMPARISON:  None. FINDINGS:  Bone/Cartilage: The bone marrow signal intensity is within normal limits. No fracture line or bone marrow edema. Full-thickness talocrural chondromalacia underlying subchondral sclerosis and subchondral mechanical erosions in the central tibia and talus respectively. Middle subtalar joint space loss with underlying subchondral edema and subchondral mechanical erosions. Posterior and anterior subtalar joints are normal.  Inferior talar tilt and high-grade chondromalacia at the talonavicular articulation. Underlying subchondral edema. No erosion or periosteal edema. No osteochondral lesion. Tendons: The anterior extensor tendons are intact. The posterior tibialis, flexor digitorum longus and flexor hallucis longus tendons are intact. The peroneus longus and brevis tendons are intact. The Achilles tendon is normal. Ligaments: The anterior and posterior tibiofibular and posterior talofibular ligaments are intact. Chronic sprain of the anterior talofibular ligament. The calcaneofibular ligament is intact. The deltoid and spring ligament complex are intact. The Lisfranc ligament complex is intact. Miscellaneous: Plantar fascia is intact. Tarsal tunnel is normal in appearance. Sinus tarsi fat signal replaced with fluid (701:8), (601:8). Surrounding soft tissues appear normal. No joint effusion. No muscle atrophy or fatty infiltration. CONCLUSION: Left ankle: 1. Severe, advanced talocrural and middle subtalar joint arthrosis.  2. Inferior talar tilt and high-grade chondromalacia of the talonavicular articulation. 3. Chronic sprain of the anterior talofibular ligament. 4. Intact ankle tendons. 5. Sinus tarsi edema. Correlate with clinical symptoms of sinus tarsi syndrome. Thank you for the opportunity to provide your interpretation. Shyanne Staton DO A: Sedatimi Prasadradha 02/09/2023 9:15 PM              Assessment   Impression: . Encounter Diagnoses   Name Primary? Primary osteoarthritis of left ankle Yes    Morbid obesity with BMI of 40.0-44.9, adult (HCC)               Plan:     Fabrication of AFO  Continue brace minimalization outside the home and function ankle bracing within the home as tolerated  Ankle isometric home exercises  Consider adjunct treatment with QHN-ntaju-wlssoouyl tibial crural articulation  Premature to consider a candidate for ankle fusion/ankle replacement surgery  Behavior modification to promote weight loss measures and consider consultation with weight management  Continue meloxicam as needed daily with GI precautions    Overall believe the findings related to the tibiotalar joint arthritis are the main cause for her symptomatology and the subtalar joint arthrosis secondary. Proceed as outlined above         Orders:      No orders of the defined types were placed in this encounter. Reno Felder MD.      Disclaimer: \"This note was dictated with voice recognition software. Though review and correction are routine, we apologize for any errors. \"

## 2023-05-05 DIAGNOSIS — M19.072 PRIMARY OSTEOARTHRITIS OF LEFT ANKLE: ICD-10-CM

## 2023-05-08 RX ORDER — MELOXICAM 15 MG/1
15 TABLET ORAL DAILY
Qty: 30 TABLET | Refills: 2 | Status: SHIPPED | OUTPATIENT
Start: 2023-05-08

## 2023-07-18 ENCOUNTER — PATIENT MESSAGE (OUTPATIENT)
Dept: FAMILY MEDICINE CLINIC | Age: 53
End: 2023-07-18

## 2023-07-18 DIAGNOSIS — Z00.00 ANNUAL PHYSICAL EXAM: Primary | ICD-10-CM

## 2023-07-18 NOTE — TELEPHONE ENCOUNTER
From: Darius Mayer  To: Dr. Feldman [place]: 2023 1:10 PM EDT  Subject: Blood Work for Upcoming Visit    Will you please order blood work for my upcoming physical scheduled on . I will complete the blood draw prior so we can discuss results. Thanks!

## 2023-07-31 DIAGNOSIS — Z00.00 ANNUAL PHYSICAL EXAM: ICD-10-CM

## 2023-07-31 LAB
ALBUMIN SERPL-MCNC: 4.3 G/DL (ref 3.4–5)
ALBUMIN/GLOB SERPL: 1.7 {RATIO} (ref 1.1–2.2)
ALP SERPL-CCNC: 82 U/L (ref 40–129)
ALT SERPL-CCNC: 17 U/L (ref 10–40)
ANION GAP SERPL CALCULATED.3IONS-SCNC: 13 MMOL/L (ref 3–16)
AST SERPL-CCNC: 19 U/L (ref 15–37)
BASOPHILS # BLD: 0.1 K/UL (ref 0–0.2)
BASOPHILS NFR BLD: 1 %
BILIRUB SERPL-MCNC: 0.4 MG/DL (ref 0–1)
BUN SERPL-MCNC: 14 MG/DL (ref 7–20)
CALCIUM SERPL-MCNC: 9.7 MG/DL (ref 8.3–10.6)
CHLORIDE SERPL-SCNC: 103 MMOL/L (ref 99–110)
CHOLEST SERPL-MCNC: 210 MG/DL (ref 0–199)
CO2 SERPL-SCNC: 24 MMOL/L (ref 21–32)
CREAT SERPL-MCNC: 0.6 MG/DL (ref 0.6–1.1)
DEPRECATED RDW RBC AUTO: 13.5 % (ref 12.4–15.4)
EOSINOPHIL # BLD: 0.3 K/UL (ref 0–0.6)
EOSINOPHIL NFR BLD: 4.2 %
GFR SERPLBLD CREATININE-BSD FMLA CKD-EPI: >60 ML/MIN/{1.73_M2}
GLUCOSE SERPL-MCNC: 111 MG/DL (ref 70–99)
HCT VFR BLD AUTO: 42.1 % (ref 36–48)
HDLC SERPL-MCNC: 42 MG/DL (ref 40–60)
HGB BLD-MCNC: 13.9 G/DL (ref 12–16)
LDLC SERPL CALC-MCNC: 124 MG/DL
LYMPHOCYTES # BLD: 2.6 K/UL (ref 1–5.1)
LYMPHOCYTES NFR BLD: 33.2 %
MCH RBC QN AUTO: 29.3 PG (ref 26–34)
MCHC RBC AUTO-ENTMCNC: 33 G/DL (ref 31–36)
MCV RBC AUTO: 88.7 FL (ref 80–100)
MONOCYTES # BLD: 0.7 K/UL (ref 0–1.3)
MONOCYTES NFR BLD: 8.3 %
NEUTROPHILS # BLD: 4.2 K/UL (ref 1.7–7.7)
NEUTROPHILS NFR BLD: 53.3 %
PLATELET # BLD AUTO: 288 K/UL (ref 135–450)
PMV BLD AUTO: 9.3 FL (ref 5–10.5)
POTASSIUM SERPL-SCNC: 4.6 MMOL/L (ref 3.5–5.1)
PROT SERPL-MCNC: 6.9 G/DL (ref 6.4–8.2)
RBC # BLD AUTO: 4.75 M/UL (ref 4–5.2)
SODIUM SERPL-SCNC: 140 MMOL/L (ref 136–145)
TRIGL SERPL-MCNC: 222 MG/DL (ref 0–150)
TSH SERPL DL<=0.005 MIU/L-ACNC: 2.39 UIU/ML (ref 0.27–4.2)
VLDLC SERPL CALC-MCNC: 44 MG/DL
WBC # BLD AUTO: 7.9 K/UL (ref 4–11)

## 2023-08-04 ASSESSMENT — PATIENT HEALTH QUESTIONNAIRE - PHQ9
2. FEELING DOWN, DEPRESSED OR HOPELESS: 1
SUM OF ALL RESPONSES TO PHQ QUESTIONS 1-9: 2
1. LITTLE INTEREST OR PLEASURE IN DOING THINGS: 1
SUM OF ALL RESPONSES TO PHQ QUESTIONS 1-9: 2
SUM OF ALL RESPONSES TO PHQ9 QUESTIONS 1 & 2: 2
2. FEELING DOWN, DEPRESSED OR HOPELESS: SEVERAL DAYS
SUM OF ALL RESPONSES TO PHQ QUESTIONS 1-9: 2
SUM OF ALL RESPONSES TO PHQ QUESTIONS 1-9: 2
1. LITTLE INTEREST OR PLEASURE IN DOING THINGS: SEVERAL DAYS
SUM OF ALL RESPONSES TO PHQ9 QUESTIONS 1 & 2: 2

## 2023-08-07 ENCOUNTER — OFFICE VISIT (OUTPATIENT)
Dept: FAMILY MEDICINE CLINIC | Age: 53
End: 2023-08-07
Payer: COMMERCIAL

## 2023-08-07 VITALS
SYSTOLIC BLOOD PRESSURE: 128 MMHG | WEIGHT: 249 LBS | DIASTOLIC BLOOD PRESSURE: 76 MMHG | HEIGHT: 64 IN | OXYGEN SATURATION: 98 % | HEART RATE: 83 BPM | BODY MASS INDEX: 42.51 KG/M2

## 2023-08-07 DIAGNOSIS — E78.5 HYPERLIPIDEMIA, UNSPECIFIED HYPERLIPIDEMIA TYPE: ICD-10-CM

## 2023-08-07 DIAGNOSIS — E03.9 ACQUIRED HYPOTHYROIDISM: ICD-10-CM

## 2023-08-07 DIAGNOSIS — Z00.00 ANNUAL PHYSICAL EXAM: Primary | ICD-10-CM

## 2023-08-07 DIAGNOSIS — E66.01 MORBID OBESITY WITH BMI OF 40.0-44.9, ADULT (HCC): ICD-10-CM

## 2023-08-07 PROCEDURE — 90472 IMMUNIZATION ADMIN EACH ADD: CPT | Performed by: FAMILY MEDICINE

## 2023-08-07 PROCEDURE — 90715 TDAP VACCINE 7 YRS/> IM: CPT | Performed by: FAMILY MEDICINE

## 2023-08-07 PROCEDURE — 90471 IMMUNIZATION ADMIN: CPT | Performed by: FAMILY MEDICINE

## 2023-08-07 PROCEDURE — 99396 PREV VISIT EST AGE 40-64: CPT | Performed by: FAMILY MEDICINE

## 2023-08-07 PROCEDURE — 90750 HZV VACC RECOMBINANT IM: CPT | Performed by: FAMILY MEDICINE

## 2023-10-17 DIAGNOSIS — E03.9 HYPOTHYROIDISM, UNSPECIFIED TYPE: ICD-10-CM

## 2023-10-17 NOTE — TELEPHONE ENCOUNTER
Medication:   Requested Prescriptions     Pending Prescriptions Disp Refills    levothyroxine (SYNTHROID) 50 MCG tablet [Pharmacy Med Name: LEVOTHYROXINE 0.05MG (50MCG) TAB] 90 tablet 0     Sig: TAKE 1 TABLET BY MOUTH DAILY       Last Filled:  01/26/2023 #90 0rf     Patient Phone Number: [phone] (home) [phone] (work)    Last appt: 8/7/2023   Next appt: Visit date not found    Last Thyroid:   Lab Results   Component Value Date/Time    TSH 2.39 07/31/2023 09:20 AM    T4FREE 1.4 03/30/2017 11:21 AM    C4ZMMQD 7.5 02/21/2014 09:49 AM

## 2023-10-18 RX ORDER — LEVOTHYROXINE SODIUM 0.05 MG/1
TABLET ORAL
Qty: 90 TABLET | Refills: 0 | Status: SHIPPED | OUTPATIENT
Start: 2023-10-18

## 2023-12-26 RX ORDER — BUDESONIDE AND FORMOTEROL FUMARATE DIHYDRATE 160; 4.5 UG/1; UG/1
AEROSOL RESPIRATORY (INHALATION)
Qty: 10.2 G | Refills: 0 | Status: SHIPPED | OUTPATIENT
Start: 2023-12-26 | End: 2023-12-27

## 2023-12-26 NOTE — TELEPHONE ENCOUNTER
Medication:   Requested Prescriptions     Pending Prescriptions Disp Refills    budesonide-formoterol (SYMBICORT) 160-4.5 MCG/ACT AERO [Pharmacy Med Name: BUDESONIDE/FORM 160/4.5MCG(120 INH)] 10.2 g 0     Sig: INHALE 2 PUFFS BY MOUTH TWICE DAILY        Last Filled:  06/12/2023 #1 0rf     Patient Phone Number: [phone] (home) [phone] (work)    Last appt: 8/7/2023   Next appt: 1/11/2024    Last OARRS:        No data to display

## 2023-12-27 RX ORDER — BUDESONIDE AND FORMOTEROL FUMARATE DIHYDRATE 160; 4.5 UG/1; UG/1
AEROSOL RESPIRATORY (INHALATION)
Qty: 30.6 G | Refills: 0 | Status: SHIPPED | OUTPATIENT
Start: 2023-12-27

## 2024-01-09 SDOH — ECONOMIC STABILITY: INCOME INSECURITY: HOW HARD IS IT FOR YOU TO PAY FOR THE VERY BASICS LIKE FOOD, HOUSING, MEDICAL CARE, AND HEATING?: NOT HARD AT ALL

## 2024-01-09 SDOH — ECONOMIC STABILITY: FOOD INSECURITY: WITHIN THE PAST 12 MONTHS, YOU WORRIED THAT YOUR FOOD WOULD RUN OUT BEFORE YOU GOT MONEY TO BUY MORE.: NEVER TRUE

## 2024-01-09 SDOH — ECONOMIC STABILITY: FOOD INSECURITY: WITHIN THE PAST 12 MONTHS, THE FOOD YOU BOUGHT JUST DIDN'T LAST AND YOU DIDN'T HAVE MONEY TO GET MORE.: NEVER TRUE

## 2024-01-11 ENCOUNTER — OFFICE VISIT (OUTPATIENT)
Dept: FAMILY MEDICINE CLINIC | Age: 54
End: 2024-01-11

## 2024-01-11 ENCOUNTER — HOSPITAL ENCOUNTER (OUTPATIENT)
Dept: MAMMOGRAPHY | Age: 54
Discharge: HOME | End: 2024-01-11
Payer: COMMERCIAL

## 2024-01-11 VITALS
OXYGEN SATURATION: 96 % | DIASTOLIC BLOOD PRESSURE: 82 MMHG | HEIGHT: 64 IN | HEART RATE: 104 BPM | WEIGHT: 249 LBS | SYSTOLIC BLOOD PRESSURE: 128 MMHG | BODY MASS INDEX: 42.51 KG/M2

## 2024-01-11 VITALS — HEIGHT: 64 IN | WEIGHT: 245 LBS | BODY MASS INDEX: 41.83 KG/M2

## 2024-01-11 DIAGNOSIS — Z71.84 TRAVEL ADVICE ENCOUNTER: ICD-10-CM

## 2024-01-11 DIAGNOSIS — H61.21 RIGHT EAR IMPACTED CERUMEN: Primary | ICD-10-CM

## 2024-01-11 DIAGNOSIS — E03.9 HYPOTHYROIDISM, UNSPECIFIED TYPE: ICD-10-CM

## 2024-01-11 DIAGNOSIS — Z12.31 SCREENING MAMMOGRAM FOR BREAST CANCER: ICD-10-CM

## 2024-01-11 DIAGNOSIS — J45.40 MODERATE PERSISTENT EXTRINSIC ASTHMA WITHOUT COMPLICATION: ICD-10-CM

## 2024-01-11 PROCEDURE — 77063 BREAST TOMOSYNTHESIS BI: CPT

## 2024-01-11 RX ORDER — BUDESONIDE AND FORMOTEROL FUMARATE DIHYDRATE 160; 4.5 UG/1; UG/1
2 AEROSOL RESPIRATORY (INHALATION) 2 TIMES DAILY
Qty: 30.6 G | Refills: 5 | Status: SHIPPED | OUTPATIENT
Start: 2024-01-11

## 2024-01-11 RX ORDER — ATOVAQUONE AND PROGUANIL HYDROCHLORIDE 250; 100 MG/1; MG/1
1 TABLET, FILM COATED ORAL DAILY
Qty: 23 TABLET | Refills: 0 | Status: SHIPPED | OUTPATIENT
Start: 2024-01-11

## 2024-01-11 RX ORDER — LEVOTHYROXINE SODIUM 0.05 MG/1
TABLET ORAL
Qty: 90 TABLET | Refills: 1 | Status: SHIPPED | OUTPATIENT
Start: 2024-01-11

## 2024-01-11 RX ORDER — SCOLOPAMINE TRANSDERMAL SYSTEM 1 MG/1
1 PATCH, EXTENDED RELEASE TRANSDERMAL
Qty: 10 PATCH | Refills: 0 | Status: SHIPPED | OUTPATIENT
Start: 2024-01-11

## 2024-01-11 RX ORDER — AZITHROMYCIN 250 MG/1
250 TABLET, FILM COATED ORAL SEE ADMIN INSTRUCTIONS
Qty: 6 TABLET | Refills: 0 | Status: SHIPPED | OUTPATIENT
Start: 2024-01-11 | End: 2024-01-16

## 2024-01-11 ASSESSMENT — PATIENT HEALTH QUESTIONNAIRE - PHQ9
SUM OF ALL RESPONSES TO PHQ QUESTIONS 1-9: 0
SUM OF ALL RESPONSES TO PHQ9 QUESTIONS 1 & 2: 0
SUM OF ALL RESPONSES TO PHQ QUESTIONS 1-9: 0
SUM OF ALL RESPONSES TO PHQ QUESTIONS 1-9: 0
2. FEELING DOWN, DEPRESSED OR HOPELESS: 0
SUM OF ALL RESPONSES TO PHQ QUESTIONS 1-9: 0
1. LITTLE INTEREST OR PLEASURE IN DOING THINGS: 0

## 2024-01-11 NOTE — PROGRESS NOTES
Lise Ferrari is a 53 y.o. female.    HPI:  Will be travelling to [place] [facility] next [place]. [place] Will be in healthcare facilities but not any direct patient contact. Believes had polio vaccines as a kid. Had yellow fever vaccine at [facility] already.     Asthma stable. Needs refill of symbicort.    C/o irritation in right ear canal.     ROS:  Gen:  Denies fever, chills, headaches.  HEENT:  Denies cold symptoms, sore throat.  CV:  Denies chest pain or tightness, palpitations.  Pulm:  Denies shortness of breath, cough.  Abd:  Denies abdominal pain, change in bowel habits.    I have reviewed the patient's medical/surgical/family/social in detail and updated the computerized patient record as appropriate.    Current Outpatient Medications   Medication Sig Dispense Refill    levothyroxine (SYNTHROID) 50 MCG tablet TAKE 1 TABLET BY MOUTH DAILY 90 tablet 1    atovaquone-proguanil (MALARONE) 250-100 MG per tablet Take 1 tablet by mouth daily 23 tablet 0    scopolamine (TRANSDERM-SCOP) transdermal patch Place 1 patch onto the skin every 72 hours 10 patch 0    azithromycin (ZITHROMAX) 250 MG tablet Take 1 tablet by mouth See Admin Instructions for 5 days 500mg on day 1 followed by 250mg on days 2 - 5 6 tablet 0    budesonide-formoterol (SYMBICORT) 160-4.5 MCG/ACT AERO Inhale 2 puffs into the lungs 2 times daily 30.6 g 5    albuterol sulfate HFA (VENTOLIN HFA) 108 (90 Base) MCG/ACT inhaler Inhale 2 puffs into the lungs every 6 hours as needed for Wheezing 1 each 3    Azelaic Acid (FINACEA) 15 % GEL Apply to affected area daily 50 g 5    triamcinolone (KENALOG) 0.1 % ointment Apply to affected area BID PRN flares 80 g 2    fluorouracil (EFUDEX) 5 % cream Apply topically 2 times daily x 2-3 weeks.  Avoid the eyes. (Patient taking differently: Apply topically 2 times daily x 2-3 weeks.  Avoid the eyes.) 40 g 0    Vitamin D (CHOLECALCIFEROL) 1000 UNITS CAPS capsule Take 1 capsule by

## 2024-01-12 LAB
MEV IGG SER QL IA: NORMAL
MUV IGG SER QL IA: NORMAL
RUBV IGG SERPL QL IA: NORMAL
VZV IGG SER QL IA: NORMAL

## 2024-01-16 ENCOUNTER — NURSE ONLY (OUTPATIENT)
Dept: FAMILY MEDICINE CLINIC | Age: 54
End: 2024-01-16
Payer: COMMERCIAL

## 2024-01-16 DIAGNOSIS — Z23 NEED FOR VACCINATION: Primary | ICD-10-CM

## 2024-01-16 PROCEDURE — 90471 IMMUNIZATION ADMIN: CPT | Performed by: FAMILY MEDICINE

## 2024-01-16 PROCEDURE — 90707 MMR VACCINE SC: CPT | Performed by: FAMILY MEDICINE

## 2024-01-30 DIAGNOSIS — J45.40 MODERATE PERSISTENT EXTRINSIC ASTHMA WITHOUT COMPLICATION: ICD-10-CM

## 2024-01-30 RX ORDER — BUDESONIDE AND FORMOTEROL FUMARATE DIHYDRATE 160; 4.5 UG/1; UG/1
2 AEROSOL RESPIRATORY (INHALATION) 2 TIMES DAILY
Qty: 30.6 G | Refills: 5 | Status: SHIPPED | OUTPATIENT
Start: 2024-01-30

## 2024-01-30 NOTE — TELEPHONE ENCOUNTER
Received a fax from pharmacy stating that Symbicort is available in a cost saving generic alternative, please send the generic

## 2024-05-09 ENCOUNTER — OFFICE VISIT (OUTPATIENT)
Dept: FAMILY MEDICINE CLINIC | Age: 54
End: 2024-05-09
Payer: COMMERCIAL

## 2024-05-09 VITALS
WEIGHT: 250.2 LBS | DIASTOLIC BLOOD PRESSURE: 74 MMHG | BODY MASS INDEX: 42.95 KG/M2 | OXYGEN SATURATION: 97 % | RESPIRATION RATE: 16 BRPM | SYSTOLIC BLOOD PRESSURE: 110 MMHG | TEMPERATURE: 97.2 F | HEART RATE: 67 BPM

## 2024-05-09 DIAGNOSIS — J30.1 SEASONAL ALLERGIC RHINITIS DUE TO POLLEN: ICD-10-CM

## 2024-05-09 DIAGNOSIS — E66.01 OBESITY, CLASS III, BMI 40-49.9 (MORBID OBESITY) (HCC): ICD-10-CM

## 2024-05-09 DIAGNOSIS — H69.91 DYSFUNCTION OF RIGHT EUSTACHIAN TUBE: Primary | ICD-10-CM

## 2024-05-09 PROCEDURE — G8427 DOCREV CUR MEDS BY ELIG CLIN: HCPCS | Performed by: FAMILY MEDICINE

## 2024-05-09 PROCEDURE — 1036F TOBACCO NON-USER: CPT | Performed by: FAMILY MEDICINE

## 2024-05-09 PROCEDURE — G8417 CALC BMI ABV UP PARAM F/U: HCPCS | Performed by: FAMILY MEDICINE

## 2024-05-09 PROCEDURE — 3017F COLORECTAL CA SCREEN DOC REV: CPT | Performed by: FAMILY MEDICINE

## 2024-05-09 PROCEDURE — 99214 OFFICE O/P EST MOD 30 MIN: CPT | Performed by: FAMILY MEDICINE

## 2024-05-09 NOTE — PROGRESS NOTES
Chief complaint: Otalgia (Right ear pain that goes down neck. No hearing loss. )      SUBJECTIVE:  CARO Ferrari (:  1970) is a 54 y.o. female with a past medical history of MARU who presents with a chief complaint of: Rt ear pain x2 weeks, worse the last week. Not sick recently. Hx of allergies that are flaring. Ears feel full. Back of Rt ear and down neck hurts. Not sleeping well d/t the pain. Can't pop ears d/t fullness    Patient Active Problem List   Diagnosis    Hypothyroidism    AR (allergic rhinitis)    Allergy-induced asthma    PCOS (polycystic ovarian syndrome)    Eczema    Morbid obesity with BMI of 40.0-44.9, adult ([facility])     Past Medical History:   Diagnosis Date    Allergy-induced asthma 2014    AR (allergic rhinitis)     Eczema     Morbid obesity with BMI of 40.0-44.9, adult ([facility])     PCO (polycystic ovaries)     Unspecified hypothyroidism      Current Outpatient Medications on File Prior to Visit   Medication Sig Dispense Refill    budesonide-formoterol (SYMBICORT) 160-4.5 MCG/ACT AERO Inhale 2 puffs into the lungs 2 times daily 30.6 g 5    levothyroxine (SYNTHROID) 50 MCG tablet TAKE 1 TABLET BY MOUTH DAILY 90 tablet 1    albuterol sulfate HFA (VENTOLIN HFA) 108 (90 Base) MCG/ACT inhaler Inhale 2 puffs into the lungs every 6 hours as needed for Wheezing 1 each 3    Azelaic Acid (FINACEA) 15 % GEL Apply to affected area daily 50 g 5    triamcinolone (KENALOG) 0.1 % ointment Apply to affected area BID PRN flares 80 g 2    fluorouracil (EFUDEX) 5 % cream Apply topically 2 times daily x 2-3 weeks.  Avoid the eyes. (Patient taking differently: Apply topically 2 times daily x 2-3 weeks.  Avoid the eyes.) 40 g 0    Vitamin D (CHOLECALCIFEROL) 1000 UNITS CAPS capsule Take 1 capsule by mouth daily      cetirizine (ZYRTEC) 10 MG tablet Take 1 tablet by mouth daily      therapeutic multivitamin-minerals (THERAGRAN-M) tablet Take 1 tablet by mouth daily      atovaquone-proguanil (MALARONE)

## 2024-05-23 ENCOUNTER — OFFICE VISIT (OUTPATIENT)
Dept: DERMATOLOGY | Age: 54
End: 2024-05-23
Payer: COMMERCIAL

## 2024-05-23 DIAGNOSIS — L81.4 LENTIGINES: ICD-10-CM

## 2024-05-23 DIAGNOSIS — L71.9 ROSACEA: ICD-10-CM

## 2024-05-23 DIAGNOSIS — L40.9 PSORIASIS: ICD-10-CM

## 2024-05-23 DIAGNOSIS — D22.9 MULTIPLE NEVI: ICD-10-CM

## 2024-05-23 DIAGNOSIS — L57.0 AK (ACTINIC KERATOSIS): Primary | ICD-10-CM

## 2024-05-23 PROCEDURE — 3017F COLORECTAL CA SCREEN DOC REV: CPT | Performed by: DERMATOLOGY

## 2024-05-23 PROCEDURE — 99214 OFFICE O/P EST MOD 30 MIN: CPT | Performed by: DERMATOLOGY

## 2024-05-23 PROCEDURE — 17003 DESTRUCT PREMALG LES 2-14: CPT | Performed by: DERMATOLOGY

## 2024-05-23 PROCEDURE — G8427 DOCREV CUR MEDS BY ELIG CLIN: HCPCS | Performed by: DERMATOLOGY

## 2024-05-23 PROCEDURE — 1036F TOBACCO NON-USER: CPT | Performed by: DERMATOLOGY

## 2024-05-23 PROCEDURE — 17000 DESTRUCT PREMALG LESION: CPT | Performed by: DERMATOLOGY

## 2024-05-23 PROCEDURE — G8417 CALC BMI ABV UP PARAM F/U: HCPCS | Performed by: DERMATOLOGY

## 2024-05-23 RX ORDER — TRIAMCINOLONE ACETONIDE 1 MG/G
OINTMENT TOPICAL
Qty: 80 G | Refills: 2 | Status: SHIPPED | OUTPATIENT
Start: 2024-05-23

## 2024-05-23 NOTE — PROGRESS NOTES
lesion(s) treated with liquid nitrogen with cryac or swab.  Treated with 2 cycles for 1-5 seconds each after consent from patient.   Patient educated on risk of blister, hypopigmentation/scar and wound care.  Tolerated well.   - cont sun protection    2. Benign-appearing nevi and lentigines  - Monitor for ABCD's of MM and si/sx of NMSC  Continue sun protection - OTC sunscreen with SPF 30-50+ recommended and reviewed usage  Encouraged skin check yearly (sooner if indicated), self checks    3. psoriasis (ddx dermatitis) - focal areas on the upper arms today  - TAC oint bid prn flares; ed se/misuse  - call if worsening    4. Mild rosacea - mod ET and PP - flaring  - use finacea daily - bid  - can add oral abx if flaring badly - will call if she wants the rx

## 2024-05-23 NOTE — PATIENT INSTRUCTIONS
Cryosurgery (Freezing) Wound Care Instructions    AFTER THE PROCEDURE:   You will notice swelling and redness around the site. This is normal.   You may experience a sharp or sore feeling for the next several days. For this discomfort, you may take acetaminophen (Tylenol©).   A blister may develop at the treated area, sometimes as soon as by the end of the day. After several days, the blister will subside and a scab will form.   If the area is bumped or traumatized during the first few days following freezing, you may develop bleeding into the blister, forming a blood blister. This is nothing to be alarmed about.  If the blister is tense, uncomfortable, or much larger than the site that was frozen, you may pop the blister along its edge with a sterile needle (boiled, heated under a flame, or cleaned with alcohol) to allow the fluid to drain out. If the blister does not bother you, no treatment is needed.   Do NOT peel off the top of the blister roof. It will act as a dressing on top of your wound.   WOUND CARE:   You may shower or bathe as usual, but avoid scrubbing the areas that have been frozen.    Cleanse the site twice a day with mild soapy water, and then apply a thin film of white petrolatum (Vaseline©).   You do not need to cover the area, but can if you prefer.   Do NOT allow the site to become dry or crusted, or attempt to dry it out with rubbing alcohol or hydrogen peroxide.   Continue this regimen until the area is pink and healed. Depending on the size and location of your cryosurgery site, healing may take 2 to 4 weeks.   The area may continue to be pink for several weeks, and over the next few months may become darker or lighter than the surrounding skin. This may be a permanent change.          Protecting Yourself From the Sun    Apply an over-the-counter broad spectrum water resistant sunscreen with an SPF of at least 30 to exposed areas of the skin. Don’t forget the ears and lips! Remember to

## 2024-07-22 DIAGNOSIS — E03.9 HYPOTHYROIDISM, UNSPECIFIED TYPE: ICD-10-CM

## 2024-07-22 RX ORDER — LEVOTHYROXINE SODIUM 0.05 MG/1
TABLET ORAL
Qty: 90 TABLET | Refills: 1 | Status: SHIPPED | OUTPATIENT
Start: 2024-07-22

## 2024-07-22 NOTE — TELEPHONE ENCOUNTER
Medication:   Requested Prescriptions     Pending Prescriptions Disp Refills    levothyroxine (SYNTHROID) 50 MCG tablet 90 tablet 1     Sig: TAKE 1 TABLET BY MOUTH DAILY       Last Filled:  01/11/2024 #90 1rf     Patient Phone Number: [phone] (home) [phone] (work)    Last appt: 5/9/2024   Next appt: Visit date not found    Last Thyroid:   Lab Results   Component Value Date/Time    TSH 2.39 07/31/2023 09:20 AM    T4FREE 1.4 03/30/2017 11:21 AM

## 2024-08-20 ENCOUNTER — PATIENT MESSAGE (OUTPATIENT)
Dept: FAMILY MEDICINE CLINIC | Age: 54
End: 2024-08-20

## 2024-08-20 DIAGNOSIS — Z13.6 ENCOUNTER FOR LIPID SCREENING FOR CARDIOVASCULAR DISEASE: ICD-10-CM

## 2024-08-20 DIAGNOSIS — Z13.220 ENCOUNTER FOR LIPID SCREENING FOR CARDIOVASCULAR DISEASE: ICD-10-CM

## 2024-08-20 DIAGNOSIS — E66.01 MORBID OBESITY WITH BMI OF 40.0-44.9, ADULT (HCC): ICD-10-CM

## 2024-08-20 DIAGNOSIS — E03.9 ACQUIRED HYPOTHYROIDISM: ICD-10-CM

## 2024-08-20 DIAGNOSIS — R53.83 OTHER FATIGUE: ICD-10-CM

## 2024-08-20 DIAGNOSIS — Z13.1 SCREENING FOR DIABETES MELLITUS: Primary | ICD-10-CM

## 2024-08-20 DIAGNOSIS — E28.2 PCOS (POLYCYSTIC OVARIAN SYNDROME): ICD-10-CM

## 2024-08-23 DIAGNOSIS — E28.2 PCOS (POLYCYSTIC OVARIAN SYNDROME): ICD-10-CM

## 2024-08-23 DIAGNOSIS — Z13.220 ENCOUNTER FOR LIPID SCREENING FOR CARDIOVASCULAR DISEASE: ICD-10-CM

## 2024-08-23 DIAGNOSIS — Z13.6 ENCOUNTER FOR LIPID SCREENING FOR CARDIOVASCULAR DISEASE: ICD-10-CM

## 2024-08-23 DIAGNOSIS — R53.83 OTHER FATIGUE: ICD-10-CM

## 2024-08-23 DIAGNOSIS — E03.9 ACQUIRED HYPOTHYROIDISM: ICD-10-CM

## 2024-08-23 DIAGNOSIS — Z13.1 SCREENING FOR DIABETES MELLITUS: ICD-10-CM

## 2024-08-23 LAB
25(OH)D3 SERPL-MCNC: 48.8 NG/ML
ALBUMIN SERPL-MCNC: 4 G/DL (ref 3.4–5)
ALBUMIN/GLOB SERPL: 1.5 {RATIO} (ref 1.1–2.2)
ALP SERPL-CCNC: 89 U/L (ref 40–129)
ALT SERPL-CCNC: 17 U/L (ref 10–40)
ANION GAP SERPL CALCULATED.3IONS-SCNC: 11 MMOL/L (ref 3–16)
AST SERPL-CCNC: 18 U/L (ref 15–37)
BASOPHILS # BLD: 0 K/UL (ref 0–0.2)
BASOPHILS NFR BLD: 0.5 %
BILIRUB SERPL-MCNC: <0.2 MG/DL (ref 0–1)
BUN SERPL-MCNC: 20 MG/DL (ref 7–20)
CALCIUM SERPL-MCNC: 9.2 MG/DL (ref 8.3–10.6)
CHLORIDE SERPL-SCNC: 104 MMOL/L (ref 99–110)
CHOLEST SERPL-MCNC: 189 MG/DL (ref 0–199)
CO2 SERPL-SCNC: 26 MMOL/L (ref 21–32)
CREAT SERPL-MCNC: <0.5 MG/DL (ref 0.6–1.1)
DEPRECATED RDW RBC AUTO: 13.4 % (ref 12.4–15.4)
EOSINOPHIL # BLD: 0.2 K/UL (ref 0–0.6)
EOSINOPHIL NFR BLD: 2.7 %
FERRITIN SERPL IA-MCNC: 38.1 NG/ML (ref 15–150)
FOLATE SERPL-MCNC: 19.9 NG/ML (ref 4.78–24.2)
GFR SERPLBLD CREATININE-BSD FMLA CKD-EPI: >90 ML/MIN/{1.73_M2}
GLUCOSE SERPL-MCNC: 99 MG/DL (ref 70–99)
HCT VFR BLD AUTO: 40.2 % (ref 36–48)
HDLC SERPL-MCNC: 50 MG/DL (ref 40–60)
HGB BLD-MCNC: 13.5 G/DL (ref 12–16)
IRON SATN MFR SERPL: 12 % (ref 15–50)
IRON SERPL-MCNC: 41 UG/DL (ref 37–145)
LDLC SERPL CALC-MCNC: 123 MG/DL
LYMPHOCYTES # BLD: 2.7 K/UL (ref 1–5.1)
LYMPHOCYTES NFR BLD: 34 %
MCH RBC QN AUTO: 29.9 PG (ref 26–34)
MCHC RBC AUTO-ENTMCNC: 33.6 G/DL (ref 31–36)
MCV RBC AUTO: 89.2 FL (ref 80–100)
MONOCYTES # BLD: 0.7 K/UL (ref 0–1.3)
MONOCYTES NFR BLD: 8.6 %
NEUTROPHILS # BLD: 4.2 K/UL (ref 1.7–7.7)
NEUTROPHILS NFR BLD: 54.2 %
PLATELET # BLD AUTO: 265 K/UL (ref 135–450)
PMV BLD AUTO: 9.1 FL (ref 5–10.5)
POTASSIUM SERPL-SCNC: 4.6 MMOL/L (ref 3.5–5.1)
PROT SERPL-MCNC: 6.7 G/DL (ref 6.4–8.2)
RBC # BLD AUTO: 4.5 M/UL (ref 4–5.2)
SODIUM SERPL-SCNC: 141 MMOL/L (ref 136–145)
TIBC SERPL-MCNC: 332 UG/DL (ref 260–445)
TRIGL SERPL-MCNC: 80 MG/DL (ref 0–150)
TSH SERPL DL<=0.005 MIU/L-ACNC: 2.06 UIU/ML (ref 0.27–4.2)
VIT B12 SERPL-MCNC: 522 PG/ML (ref 211–911)
VLDLC SERPL CALC-MCNC: 16 MG/DL
WBC # BLD AUTO: 7.8 K/UL (ref 4–11)

## 2024-09-06 ENCOUNTER — OFFICE VISIT (OUTPATIENT)
Dept: FAMILY MEDICINE CLINIC | Age: 54
End: 2024-09-06

## 2024-09-06 VITALS
OXYGEN SATURATION: 98 % | HEART RATE: 76 BPM | HEIGHT: 64 IN | SYSTOLIC BLOOD PRESSURE: 126 MMHG | DIASTOLIC BLOOD PRESSURE: 80 MMHG | WEIGHT: 255 LBS | BODY MASS INDEX: 43.54 KG/M2

## 2024-09-06 DIAGNOSIS — R35.1 NOCTURIA: ICD-10-CM

## 2024-09-06 DIAGNOSIS — E66.01 MORBID OBESITY WITH BMI OF 40.0-44.9, ADULT (HCC): ICD-10-CM

## 2024-09-06 DIAGNOSIS — R35.0 URINARY FREQUENCY: ICD-10-CM

## 2024-09-06 DIAGNOSIS — Z00.01 ENCOUNTER FOR WELL ADULT EXAM WITH ABNORMAL FINDINGS: Primary | ICD-10-CM

## 2024-09-06 NOTE — PROGRESS NOTES
Chief Complaint   Patient presents with    Annual Exam     Had colonoscopy - [facility]        SUBJECTIVE:   Lise Ferrari is a 54 y.o. female presenting for an annual checkup.      HPI:   Nocturia- getting up multiple times a night. Every 1-2 hours. And it's a lot. Started a couple weeks ago. Stopped drinking water at 8 pm. Every once in a while pain with peeing. No urinary urgency. No blood in urine. LMP 24. Fairly regular - occurs q30-40d. A little earlier w/ this last one. Not heavy at all. 4 children. All C-sections. Twins and then scheduled C/s afterwards  Running joke in the house - pees ever 1-2 hours. She has to change her habits to be close to a bathroom.  Not peeing when she sneezes or coughs     PCOS- glucose is stable.   Hx of gallstone pancreatitis. Hx of cholecystectomy 22yrs ago.    [facility]- [facility] in . Normal due in 10yrs  Pap - last one last year. [facility]. Normal due in 5yrs  Mammo-        Patient Active Problem List   Diagnosis    Hypothyroidism    AR (allergic rhinitis)    Allergy-induced asthma    PCOS (polycystic ovarian syndrome)    Eczema    Morbid obesity with BMI of 40.0-44.9, adult (HCC)     Past Medical History:   Diagnosis Date    Allergy-induced asthma 2014    AR (allergic rhinitis)     Eczema     Morbid obesity with BMI of 40.0-44.9, adult (HCC)     PCO (polycystic ovaries)     Unspecified hypothyroidism      Past Surgical History:   Procedure Laterality Date     SECTION      x3    CHOLECYSTECTOMY      SINUS SURGERY       Social History     Socioeconomic History    Marital status:      Spouse name: Law    Number of children: 4   Tobacco Use    Smoking status: Never    Smokeless tobacco: Never   Vaping Use    Vaping status: Never Used   Substance and Sexual Activity    Alcohol use: Yes     Comment: Socially    Drug use: No    Sexual activity: Yes     Partners: Male     Comment:

## 2024-09-07 ENCOUNTER — HOSPITAL ENCOUNTER (OUTPATIENT)
Dept: ULTRASOUND IMAGING | Age: 54
Discharge: HOME | End: 2024-09-07
Payer: COMMERCIAL

## 2024-09-07 DIAGNOSIS — R35.0 URINARY FREQUENCY: ICD-10-CM

## 2024-09-07 DIAGNOSIS — R35.1 NOCTURIA: ICD-10-CM

## 2024-09-07 PROCEDURE — 76830 TRANSVAGINAL US NON-OB: CPT

## 2024-09-07 PROCEDURE — 76856 US EXAM PELVIC COMPLETE: CPT

## 2024-09-26 ENCOUNTER — OFFICE VISIT (OUTPATIENT)
Dept: UROGYNECOLOGY | Age: 54
End: 2024-09-26

## 2024-09-26 VITALS
DIASTOLIC BLOOD PRESSURE: 92 MMHG | HEART RATE: 78 BPM | SYSTOLIC BLOOD PRESSURE: 134 MMHG | WEIGHT: 252 LBS | TEMPERATURE: 98.1 F | OXYGEN SATURATION: 96 % | BODY MASS INDEX: 43.26 KG/M2 | RESPIRATION RATE: 16 BRPM

## 2024-09-26 DIAGNOSIS — N32.89 DECREASED BLADDER CAPACITY: ICD-10-CM

## 2024-09-26 DIAGNOSIS — R35.0 URINARY FREQUENCY: Primary | ICD-10-CM

## 2024-09-26 LAB
BILIRUBIN, POC: NORMAL
BLOOD URINE, POC: NORMAL
CLARITY, POC: CLEAR
COLOR, POC: YELLOW
EMPTY COUGH STRESS TEST: NORMAL
FIRST SENSATION: 40 CC
FULL COUGH STRESS TEST: NORMAL
GLUCOSE URINE, POC: NORMAL MG/DL
KETONES, POC: NORMAL MG/DL
LEUKOCYTE EST, POC: NORMAL
MAX SENSATION: 170 CC
NITRATE, URINE POC: NORMAL
NITRITE, POC: NORMAL
PH, POC: 6
POST VOID RESIDUAL (PVR): 20 ML
PROTEIN, POC: NORMAL MG/DL
RBC URINE, POC: NORMAL
SECOND SENSATION: 90 CC
SPASM: NORMAL
SPECIFIC GRAVITY, POC: 1.02
UROBILINOGEN, POC: NORMAL MG/DL
WBC URINE, POC: NORMAL

## 2024-09-26 ASSESSMENT — ENCOUNTER SYMPTOMS: CHEST TIGHTNESS: 1

## 2024-10-14 NOTE — PLAN OF CARE
[facility] - Outpatient Rehabilitation and Therapy [address], [place] [address] office: [phone] fax: [phone]     Physical Therapy Initial Evaluation Certification      Dear CLAYTON Cazares *,    We had the pleasure of evaluating the following patient for physical therapy services at [facility].  A summary of our findings can be found in the initial assessment below.  This includes our plan of care.  If you have any questions or concerns regarding these findings, please do not hesitate to contact me at the office phone number listed above.  Thank you for the referral.     Physician Signature:_______________________________Date:__________________  By signing above (or electronic signature), therapist’s plan is approved by physician       Physical Therapy: TREATMENT/PROGRESS NOTE   Patient: Lise Ferrari (54 y.o. female)   Examination Date: 10/15/2024   :  1970 MRN: [id number]   Visit #: 1   Insurance Allowable Auth Needed   [place] []Yes    [x]No    Insurance: Payor: [employer] / Plan: [employer] - CHOICE PLU / Product Type: *No Product type* /   Insurance ID: [id number] - (Commercial)  Secondary Insurance (if applicable):    Treatment Diagnosis:     ICD-10-CM    1. Pelvic floor dysfunction in female  M62.89       2. Urge incontinence of urine  N39.41          Medical Diagnosis:  Urinary frequency [R35.0]   Referring Physician: Codie Chanel APRN*  PCP: Dina Cash MD   Plan of care signed (Y/N):     Date of Patient follow up with Physician:      Plan of Care Report: EVAL today  POC update due: (10 visits /OR AUTH LIMITS, whichever is less)  11/15/2024                                             Medical History:  Comorbidities:  Other Metabolic Conditions: hypothyroid  Other Cardio/Pulmonary Conditions: ashma  Relevant Medical History:                                          Precautions/ Contra-indications:

## 2024-10-15 ENCOUNTER — HOSPITAL ENCOUNTER (OUTPATIENT)
Dept: PHYSICAL THERAPY | Age: 54
Setting detail: THERAPIES SERIES
Discharge: HOME | End: 2024-10-15
Payer: COMMERCIAL

## 2024-10-15 DIAGNOSIS — M62.89 PELVIC FLOOR DYSFUNCTION IN FEMALE: Primary | ICD-10-CM

## 2024-10-15 DIAGNOSIS — N39.41 URGE INCONTINENCE OF URINE: ICD-10-CM

## 2024-10-15 PROCEDURE — 97161 PT EVAL LOW COMPLEX 20 MIN: CPT

## 2024-10-15 PROCEDURE — 97530 THERAPEUTIC ACTIVITIES: CPT

## 2024-10-29 ENCOUNTER — APPOINTMENT (OUTPATIENT)
Dept: PHYSICAL THERAPY | Age: 54
End: 2024-10-29
Payer: COMMERCIAL

## 2024-11-05 ENCOUNTER — HOSPITAL ENCOUNTER (OUTPATIENT)
Dept: PHYSICAL THERAPY | Age: 54
Setting detail: THERAPIES SERIES
Discharge: HOME | End: 2024-11-05
Payer: COMMERCIAL

## 2024-11-05 PROCEDURE — 97110 THERAPEUTIC EXERCISES: CPT

## 2024-11-05 PROCEDURE — 97112 NEUROMUSCULAR REEDUCATION: CPT

## 2024-11-05 NOTE — FLOWSHEET NOTE
- 2 x daily - 7 x weekly - 3 sets - 10 reps  - Hip Hiking on Step  - 2 x daily - 7 x weekly - 3 sets - 10 reps    ASSESSMENT     Today's Assessment: Patient had good tolerance to today's session, reporting appropriate fatigue and challenge with program completed. Able to progress  exercise difficulty on standing exercises. Continues to display deficits in tightness, stiffness, weakness, decreased dynamic stabilty, and decreased NM control which required ongoing skilled physical therapy and decision making. Slowly beginning to increase core and especially hip strength.  Pt given HEP and taught to use.     Medical Necessity Documentation:  I certify that this patient meets the below criteria necessary for medical necessity for care and/or justification of therapy services:  The patient has a complexity identified by an ICD-10 code that has a direct and significant impact on the need for therapy.  (Significantly impacts the rate of recovery and is associated with a primary condition.)     Tolerance of evaluation/treatment: Excellent    Prognosis for POC: [x] Good [] Fair  [] Poor    Patient requires continued skilled intervention: [x] Yes  [] No      CHARGE CAPTURE     PT CHARGE GRID   CPT Code (TIMED) minutes # CPT Code (UNTIMED) #     Therex (35889)  25 2  EVAL:LOW (56671 - Typically 20 minutes face-to-face)     Neuromusc. Re-ed (21612) 15 1  Re-Eval (95589)     Manual (27881)    Estim Unattended (10513)     Ther. Act (62719)    Mech. Traction (94607)     Gait (14935)    Dry Needle 1-2 muscle (91144)     Aquatic Therex (71783)    Dry Needle 3+ muscle (20561)     Iontophoresis (24223)    VASO (11625)     Ultrasound (85970)    Group Therapy (09641)     Estim Attended (93282)    Canalith Repositioning (22162)     Physical Performance Test (37983)         Other:    Other:    Total Timed Code Tx Minutes 40 3       Total Treatment Minutes 40        Charge Justification:  (13245) HOME EXERCISE PROGRAM - Reviewed/Progressed

## 2024-11-11 ENCOUNTER — HOSPITAL ENCOUNTER (OUTPATIENT)
Dept: PHYSICAL THERAPY | Age: 54
Setting detail: THERAPIES SERIES
Discharge: HOME | End: 2024-11-11
Payer: COMMERCIAL

## 2024-11-11 PROCEDURE — 97112 NEUROMUSCULAR REEDUCATION: CPT

## 2024-11-11 PROCEDURE — 97110 THERAPEUTIC EXERCISES: CPT

## 2024-11-11 NOTE — FLOWSHEET NOTE
[facility] - Outpatient Rehabilitation and Therapy [address], [place] [address] office: [phone] fax: [phone]         Physical Therapy: TREATMENT/PROGRESS NOTE   Patient: Lise Ferrari (54 y.o. female)   Examination Date: 2024   :  1970 MRN: [id number]   Visit #: 3   Insurance Allowable Auth Needed   [place] []Yes    [x]No    Insurance: Payor: UNITED HE[employer]ARE / Plan: [employer] - CHOICE PLUS / Product Type: *No Product type* /   Insurance ID: [id number] - (Commercial)  Secondary Insurance (if applicable):    Treatment Diagnosis:     ICD-10-CM    1. Pelvic floor dysfunction in female  M62.89       2. Urge incontinence of urine  N39.41          Medical Diagnosis:  Urinary frequency [R35.0]   Referring Physician: Codie Chanel APRN*  PCP: Dina Cash MD   Plan of care signed (Y/N):     Date of Patient follow up with Physician:      Plan of Care Report: NO  POC update due: (10 visits /OR AUTH LIMITS, whichever is less)  11/15/2024                                             Medical History:  Comorbidities:  Other Metabolic Conditions: hypothyroid  Other Cardio/Pulmonary Conditions: ashma  Relevant Medical History:                                          Precautions/ Contra-indications:           Latex allergy:  NO  Pacemaker:    NO  Contraindications for Manipulation: None  Date of Surgery:   Other:    Red Flags:  None    Suicide Screening:   The patient did not verbalize a primary behavioral concern, suicidal ideation, suicidal intent, or demonstrate suicidal behaviors.    Preferred Language for Healthcare:   [x] English       [] other:    SUBJECTIVE EXAMINATION     Patient stated complaint/comments: states she was sore after the visit due to the exercises. Did a lot of walking and standing but not many exercises. Foot limits everything due to pain. Still getting up every 2 hours at night and going a lot.  Discussed trying to go every 2 hours prior

## 2024-11-19 ENCOUNTER — APPOINTMENT (OUTPATIENT)
Dept: PHYSICAL THERAPY | Age: 54
End: 2024-11-19
Payer: COMMERCIAL

## 2024-11-25 ENCOUNTER — OFFICE VISIT (OUTPATIENT)
Dept: FAMILY MEDICINE CLINIC | Age: 54
End: 2024-11-25

## 2024-11-25 VITALS
SYSTOLIC BLOOD PRESSURE: 128 MMHG | TEMPERATURE: 97.4 F | HEART RATE: 77 BPM | DIASTOLIC BLOOD PRESSURE: 84 MMHG | OXYGEN SATURATION: 98 % | WEIGHT: 255 LBS | BODY MASS INDEX: 43.54 KG/M2 | HEIGHT: 64 IN

## 2024-11-25 DIAGNOSIS — J45.40 MODERATE PERSISTENT EXTRINSIC ASTHMA WITHOUT COMPLICATION: Primary | ICD-10-CM

## 2024-11-25 DIAGNOSIS — J06.9 UPPER RESPIRATORY TRACT INFECTION, UNSPECIFIED TYPE: ICD-10-CM

## 2024-11-25 DIAGNOSIS — J30.1 SEASONAL ALLERGIC RHINITIS DUE TO POLLEN: ICD-10-CM

## 2024-11-25 RX ORDER — ALBUTEROL SULFATE 90 UG/1
2 INHALANT RESPIRATORY (INHALATION) EVERY 6 HOURS PRN
Qty: 1 EACH | Refills: 3 | Status: SHIPPED | OUTPATIENT
Start: 2024-11-25

## 2024-11-25 RX ORDER — PREDNISONE 20 MG/1
40 TABLET ORAL DAILY
Qty: 10 TABLET | Refills: 0 | Status: SHIPPED | OUTPATIENT
Start: 2024-11-25 | End: 2024-11-30

## 2024-11-25 RX ORDER — AZITHROMYCIN 250 MG/1
250 TABLET, FILM COATED ORAL SEE ADMIN INSTRUCTIONS
Qty: 6 TABLET | Refills: 0 | Status: SHIPPED | OUTPATIENT
Start: 2024-11-25 | End: 2024-11-30

## 2024-11-25 NOTE — PROGRESS NOTES
Lise Ferrari   YOB: 1970    Date of Visit:  11/25/2024    Allergies   Allergen Reactions    Bactrim     Vicodin [Hydrocodone-Acetaminophen]      Outpatient Medications Marked as Taking for the 11/25/24 encounter (Office Visit) with Elizabeth Huffman MD   Medication Sig Dispense Refill    albuterol sulfate HFA (VENTOLIN HFA) 108 (90 Base) MCG/ACT inhaler Inhale 2 puffs into the lungs every 6 hours as needed for Wheezing 1 each 3    predniSONE (DELTASONE) 20 MG tablet Take 2 tablets by mouth daily for 5 days 10 tablet 0    azithromycin (ZITHROMAX) 250 MG tablet Take 1 tablet by mouth See Admin Instructions for 5 days 500mg on day 1 followed by 250mg on days 2 - 5 6 tablet 0    levothyroxine (SYNTHROID) 50 MCG tablet TAKE 1 TABLET BY MOUTH DAILY 90 tablet 1    triamcinolone (KENALOG) 0.1 % ointment Apply to affected area BID PRN flares 80 g 2    budesonide-formoterol (SYMBICORT) 160-4.5 MCG/ACT AERO Inhale 2 puffs into the lungs 2 times daily 30.6 g 5    Azelaic Acid (FINACEA) 15 % GEL Apply to affected area daily 50 g 5    fluorouracil (EFUDEX) 5 % cream Apply topically 2 times daily x 2-3 weeks.  Avoid the eyes. (Patient taking differently: Apply topically 2 times daily x 2-3 weeks.  Avoid the eyes.) 40 g 0    Vitamin D (CHOLECALCIFEROL) 1000 UNITS CAPS capsule Take 1 capsule by mouth daily      cetirizine (ZYRTEC) 10 MG tablet Take 1 tablet by mouth daily      therapeutic multivitamin-minerals (THERAGRAN-M) tablet Take 1 tablet by mouth daily           Vitals:    11/25/24 1138   BP: 128/84   Pulse: 77   Temp: 97.4 °F (36.3 °C)   SpO2: 98%   Weight: 115.7 kg (255 lb)   Height: 1.626 m (5' 4\")     Body mass index is 43.77 kg/m².     Wt Readings from Last 3 Encounters:   11/25/24 115.7 kg (255 lb)   09/26/24 114.3 kg (252 lb)   09/06/24 115.7 kg (255 lb)     BP Readings from Last 3 Encounters:   11/25/24 128/84   09/26/24 (!) 134/92   09/06/24 126/80        Chief Complaint   Patient presents with

## 2024-11-26 ENCOUNTER — APPOINTMENT (OUTPATIENT)
Dept: PHYSICAL THERAPY | Age: 54
End: 2024-11-26
Payer: COMMERCIAL

## 2024-11-30 NOTE — TELEPHONE ENCOUNTER
Medication:   Requested Prescriptions     Pending Prescriptions Disp Refills    metFORMIN (GLUCOPHAGE) 500 MG tablet 180 tablet 0        Last Filled:  Due for labs and diabetic follow up, Bugsn[employer] message sent    Patient Phone Number: [phone] (home) [phone] (work)    Last appt: 11/30/2021   Next appt: Visit date not found    Last OARRS: No flowsheet data found. unk

## 2024-12-03 ENCOUNTER — HOSPITAL ENCOUNTER (OUTPATIENT)
Dept: PHYSICAL THERAPY | Age: 54
Setting detail: THERAPIES SERIES
Discharge: HOME | End: 2024-12-03
Payer: COMMERCIAL

## 2024-12-03 PROCEDURE — 97530 THERAPEUTIC ACTIVITIES: CPT

## 2024-12-03 PROCEDURE — 97110 THERAPEUTIC EXERCISES: CPT

## 2024-12-03 NOTE — PLAN OF CARE
[facility] - Outpatient Rehabilitation and Therapy [address], [place] [address] office: [phone] fax: [phone]       Physical Therapy Re-Certification Plan of Care    Dear CLAYTON Cazares *  ,    We had the pleasure of treating the following patient for physical therapy services at [facility]. A summary of our findings can be found in the updated assessment below.  This includes our plan of care.  If you have any questions or concerns regarding these findings, please do not hesitate to contact me at the office phone number checked above.  Thank you for the referral.     Physician Signature:________________________________Date:__________________  By signing above (or electronic signature), therapist's plan is approved by physician      Total Visits: 4     Overall Response to Treatment:  Patient unable to adhere to initial POC and has been very ill.  Has attempted some exercises but was too sick to do most.  Pt is feeling better now and ready to work on exercises.     Recommendation:    [x] Continue PT 1x / wk for 4 weeks.   [] Hold PT, pending MD visit   [] Discharge to [facility]. Follow up with PT or MD PRN.    Physical Therapy: TREATMENT/PROGRESS NOTE   Patient: Lise Ferrari (54 y.o. female)   Examination Date: 2024   :  1970 MRN: [id number]   Visit #: 4   Insurance Allowable Auth Needed   [place] []Yes    [x]No    Insurance: Payor: UNITED HE[employer]CARE / Plan: UNITED HEALTHCARE - CHOICE PLUS / Product Type: *No Product type* /   Insurance ID: [id number] - (Commercial)  Secondary Insurance (if applicable):    Treatment Diagnosis:     ICD-10-CM    1. Pelvic floor dysfunction in female  M62.89       2. Urge incontinence of urine  N39.41          Medical Diagnosis:  Urinary frequency [R35.0]   Referring Physician: Codie Chanel APRN*  PCP: Dina Cash MD   Plan of care signed (Y/N):     Date of Patient follow up with Physician:

## 2024-12-10 ENCOUNTER — HOSPITAL ENCOUNTER (OUTPATIENT)
Dept: PHYSICAL THERAPY | Age: 54
Setting detail: THERAPIES SERIES
Discharge: HOME | End: 2024-12-10
Payer: COMMERCIAL

## 2024-12-10 PROCEDURE — 97530 THERAPEUTIC ACTIVITIES: CPT

## 2024-12-10 PROCEDURE — 97112 NEUROMUSCULAR REEDUCATION: CPT

## 2024-12-10 NOTE — FLOWSHEET NOTE
[facility] - Outpatient Rehabilitation and Therapy [address], [place] [address] office: [phone] fax: [phone]         Physical Therapy: TREATMENT/PROGRESS NOTE   Patient: Lise Ferrari (54 y.o. female)   Examination Date: 12/10/2024   :  1970 MRN: [id number]   Visit #: 5   Insurance Allowable Auth Needed   MN []Yes    [x]No    Insurance: Payor: UNITED HEALTHCARE / Plan: UNITED HEALTHCARE - CHOICE PLUS / Product Type: *No Product type* /   Insurance ID: [id number] - (Commercial)  Secondary Insurance (if applicable):    Treatment Diagnosis:     ICD-10-CM    1. Pelvic floor dysfunction in female  M62.89       2. Urge incontinence of urine  N39.41          Medical Diagnosis:  Urinary frequency [R35.0]   Referring Physician: Codie Chanel APRN*  PCP: Dina Cash MD   Plan of care signed (Y/N):     Date of Patient follow up with Physician:      Plan of Care Report: NO  POC update due: (10 visits /OR AUTH LIMITS, whichever is less)  1/3/2024                                             Medical History:  Comorbidities:  Other Metabolic Conditions: hypothyroid  Other Cardio/Pulmonary Conditions: ashma  Relevant Medical History:                                          Precautions/ Contra-indications:           Latex allergy:  NO  Pacemaker:    NO  Contraindications for Manipulation: None  Date of Surgery:   Other:    Red Flags:  None    Suicide Screening:   The patient did not verbalize a primary behavioral concern, suicidal ideation, suicidal intent, or demonstrate suicidal behaviors.    Preferred Language for Healthcare:   [x] English       [] other:    SUBJECTIVE EXAMINATION     Patient stated complaint/comments: states she is sore. Her ankle is really bothering her due to walking more.  Did a lot of stretching.  Is still going multiple times during the day.          EVAL: gets up multiple times to go to the bathroom during the night.  Had lots of tests.  Some

## 2024-12-17 ENCOUNTER — HOSPITAL ENCOUNTER (OUTPATIENT)
Dept: PHYSICAL THERAPY | Age: 54
Setting detail: THERAPIES SERIES
Discharge: HOME | End: 2024-12-17
Payer: COMMERCIAL

## 2024-12-17 PROCEDURE — 97112 NEUROMUSCULAR REEDUCATION: CPT

## 2024-12-17 PROCEDURE — 97110 THERAPEUTIC EXERCISES: CPT

## 2024-12-17 PROCEDURE — 97140 MANUAL THERAPY 1/> REGIONS: CPT

## 2024-12-17 NOTE — FLOWSHEET NOTE
Falls Risk assessed and no intervention required.    Barriers to/and or personal factors that will affect rehab potential:  None      Exercises/Interventions     Therapeutic Ex (05824)  Resistance Sets/time Reps Notes/Cues/Progressions   Hold/ relax 5'/10'     bridges   X 10    SLR      Clamshells  Hip abd  Prone hip ext  SLS  Hip hike  Wall plank [place]            15'       Unable to be straight   Lumbar roll  Child's pose     2 X 10     Butterfly stretch  30' X 5           Stretches in sitting hams, piriformis       TrA hold with SL hip flex B     With hip abd B    With heel slide   X 10  X 10  X 10           NMR re-education (10334)       Quadriped UE lift  LE lift   Bird dog  Wall sit  Wall plank with hip weight shift  Hip hike at counter    X 10  X 10  X 10  X 10  Rest in child's pose often       SLS with SB hold at knee 10' hold  X 2                         Manual Intervention (70310) Time: 15  mins   ITB  gentle STM to ITB in sidelying         Therapeutic Activity (30167) Time:   Bladder re-training/education:    Bladder Diary: patient educated on importance of filling out bladder diary at home, complete with fluid intake, voids, and leakage when applicable.   Voiding: patient educated on normal voiding and urinary cycle and the physiology of bladder control muscles and pelvic floor.     The patient was educated on bladder dysfunction as well as ROBERT with urethral involvement.  The patient was also educated on prolapse and it's affect on urination and pain.   Dietary: patient educated on the \"4Cs\" to reduce bladder irritation and leakage.   Other: Pt given written copy of this   H    Modalities:    No modalities applied this session    Education/Home Exercise Program: HEP discussed and performed, see exercise grid  Access Code: [email or web address]  URL: [email or web address]  Date: 12/03/2024  Prepared by: Laina Alfaro    Exercises  - Seated Hamstring Stretch  - 2 x daily - 7 x weekly - 1 sets - 4 reps - 30' hold  -

## 2024-12-26 ENCOUNTER — OFFICE VISIT (OUTPATIENT)
Dept: UROGYNECOLOGY | Age: 54
End: 2024-12-26
Payer: COMMERCIAL

## 2024-12-26 VITALS
TEMPERATURE: 97.9 F | OXYGEN SATURATION: 97 % | DIASTOLIC BLOOD PRESSURE: 89 MMHG | SYSTOLIC BLOOD PRESSURE: 129 MMHG | RESPIRATION RATE: 18 BRPM | HEART RATE: 76 BPM

## 2024-12-26 DIAGNOSIS — N32.89 DECREASED BLADDER CAPACITY: ICD-10-CM

## 2024-12-26 DIAGNOSIS — R35.0 URINARY FREQUENCY: Primary | ICD-10-CM

## 2024-12-26 PROCEDURE — G8427 DOCREV CUR MEDS BY ELIG CLIN: HCPCS | Performed by: NURSE PRACTITIONER

## 2024-12-26 PROCEDURE — 1036F TOBACCO NON-USER: CPT | Performed by: NURSE PRACTITIONER

## 2024-12-26 PROCEDURE — 3017F COLORECTAL CA SCREEN DOC REV: CPT | Performed by: NURSE PRACTITIONER

## 2024-12-26 PROCEDURE — G8417 CALC BMI ABV UP PARAM F/U: HCPCS | Performed by: NURSE PRACTITIONER

## 2024-12-26 PROCEDURE — 99212 OFFICE O/P EST SF 10 MIN: CPT | Performed by: NURSE PRACTITIONER

## 2024-12-26 PROCEDURE — G8484 FLU IMMUNIZE NO ADMIN: HCPCS | Performed by: NURSE PRACTITIONER

## 2024-12-26 NOTE — PROGRESS NOTES
Transportation Needs: No Transportation Needs (3/15/2024)    Received from  "[employer]  "[employer]  Health,  Health, [facility],  Health    Yearly Questionnaire     Do you need any assistance with obtaining housing, meals, medication, transportation or medical equipment?: No     Assistance needed for:: Not on file   Physical Activity: Unknown (8/22/2022)    Exercise Vital Sign     Days of Exercise per Week: Not on file     Minutes of Exercise per Session: 0 min   Stress: No Stress Concern Present (11/30/2021)    [employer] [place] [employer] - Occupational Stress Questionnaire     Feeling of Stress : Not at all   Social Connections: Socially Integrated (11/30/2021)    Social Connection and Isolation Panel [NHANES]     Frequency of Communication with Friends and Family: More than three times a week     Frequency of Social Gatherings with Friends and Family: More than three times a week     Attends Islam Services: More than 4 times per year     Active Member of Clubs or Organizations: Yes     Attends Club or Organization Meetings: More than 4 times per year     Marital Status:    Intimate Partner Violence: Not At Risk (8/22/2022)    Humiliation, Afraid, Rape, and Kick questionnaire     Fear of Current or Ex-Partner: No     Emotionally Abused: No     Physically Abused: No     Sexually Abused: No   Housing Stability: No Transportation Needs (3/15/2024)    Received from  "[employer]  "[employer]  "[employer]  "[employer] [facility],  Health    Yearly Questionnaire     Do you need any assistance with obtaining housing, meals, medication, transportation or medical equipment?: No     Assistance needed for:: Not on file     Family History:   Family History   Problem Relation Age of Onset    Cancer Mother         Bile Duct Cancer    Hypertension Father     Cancer Maternal Grandmother         breast    Breast Cancer Maternal Grandmother          Objective:     Vitals  Vitals:    12/26/24 0936   BP: 129/89

## 2024-12-31 ENCOUNTER — HOSPITAL ENCOUNTER (OUTPATIENT)
Dept: PHYSICAL THERAPY | Age: 54
Setting detail: THERAPIES SERIES
Discharge: HOME | End: 2024-12-31
Payer: COMMERCIAL

## 2024-12-31 PROCEDURE — 97110 THERAPEUTIC EXERCISES: CPT

## 2024-12-31 PROCEDURE — 97140 MANUAL THERAPY 1/> REGIONS: CPT

## 2024-12-31 PROCEDURE — 97112 NEUROMUSCULAR REEDUCATION: CPT

## 2024-12-31 NOTE — FLOWSHEET NOTE
[facility] - Outpatient Rehabilitation and Therapy [address], [place] [address] office: [phone] fax: [phone]         Physical Therapy: TREATMENT/PROGRESS NOTE   Patient: Lise Ferrari (54 y.o. female)   Examination Date: 2024   :  1970 MRN: [id number]   Visit #: 7   Insurance Allowable Auth Needed   MN []Yes    [x]No    Insurance: Payor: UNITED HEALTHCARE / Plan: UNITED HEALTHCARE - CHOICE PLUS / Product Type: *No Product type* /   Insurance ID: [id number] - (Commercial)  Secondary Insurance (if applicable):    Treatment Diagnosis:     ICD-10-CM    1. Pelvic floor dysfunction in female  M62.89       2. Urge incontinence of urine  N39.41          Medical Diagnosis:  Urinary frequency [R35.0]   Referring Physician: Codie Chanel APRN*  PCP: Dina Cash MD   Plan of care signed (Y/N):     Date of Patient follow up with Physician:      Plan of Care Report: NO  POC update due: (10 visits /OR AUTH LIMITS, whichever is less)  1/3/2024                                             Medical History:  Comorbidities:  Other Metabolic Conditions: hypothyroid  Other Cardio/Pulmonary Conditions: ashma  Relevant Medical History:                                          Precautions/ Contra-indications:           Latex allergy:  NO  Pacemaker:    NO  Contraindications for Manipulation: None  Date of Surgery:   Other:    Red Flags:  None    Suicide Screening:   The patient did not verbalize a primary behavioral concern, suicidal ideation, suicidal intent, or demonstrate suicidal behaviors.    Preferred Language for Healthcare:   [x] English       [] other:    SUBJECTIVE EXAMINATION     Patient stated complaint/comments: states she did a lot of walking yesterday  (2 miles). Stood a lot on the 27,stretched a lot the next day and felt much better. Really liking the stretching for pain control.  The MD released her.     EVAL: gets up multiple times to go to the bathroom

## 2025-01-08 ENCOUNTER — HOSPITAL ENCOUNTER (OUTPATIENT)
Dept: PHYSICAL THERAPY | Age: 55
Setting detail: THERAPIES SERIES
Discharge: HOME | End: 2025-01-08
Payer: COMMERCIAL

## 2025-01-08 PROCEDURE — 97112 NEUROMUSCULAR REEDUCATION: CPT

## 2025-01-08 PROCEDURE — 97530 THERAPEUTIC ACTIVITIES: CPT

## 2025-01-08 NOTE — PLAN OF CARE
X 10 B    Pelvic floor elevator  Pelvic floor elevator with lengthening   4 floors  4 floors/ basement  X 5  X 5           Manual Intervention (42668) Time: ITB  gentle STM to ITB in sidelying         Therapeutic Activity (98031) Time: 28 mins   Bladder re-training/education:    Bladder Diary: patient educated on importance of filling out bladder diary at home, complete with fluid intake, voids, and leakage when applicable.   Voiding: patient educated on normal voiding and urinary cycle and the physiology of bladder control muscles and pelvic floor.     Hold SLS relax x 10              Hold Bicep curl  relax  x 10               Hold partial lunge relax  Hold step up 4' step step down x 10   HHold squat relax x 10      Modalities:    No modalities applied this session    Education/Home Exercise Program: HEP discussed and performed, see exercise grid  Access Code: [email or web address]  URL: [email or web address]  Date: 12/03/2024  Prepared by: Laina Alfaro    Exercises  - Seated Hamstring Stretch  - 2 x daily - 7 x weekly - 1 sets - 4 reps - 30' hold  - Seated Piriformis Stretch  - 2 x daily - 7 x weekly - 1 sets - 4 reps - 30' hold  - Seated Piriformis Stretch  - 2 x daily - 7 x weekly - 1 sets - 4 reps - 30' hold  Access Code: [email or web address]  URL: [email or web address]  Date: 11/05/2024  Prepared by: Laina Alfaro    Exercises  - Supine Bridge  - 2 x daily - 7 x weekly - 1 sets - 10 reps  - Supine Active Straight Leg Raise  - 2 x daily - 7 x weekly - 1 sets - 10 reps  - Beginner Clam  - 2 x daily - 7 x weekly - 1 sets - 10 reps  - Sidelying Pelvic Floor Contraction with Hip Abduction  - 2 x daily - 7 x weekly - 1 sets - 10 reps  - Prone Hip Extension  - 2 x daily - 7 x weekly - 1 sets - 10 reps  - Standing Plank on Wall  - 2 x daily - 7 x weekly - 1 sets - 5 reps - 15 sec hold  - Standing Single Leg Stance with Counter Support  - 2 x daily - 7 x weekly - 3 sets - 10 reps  - Hip Hiking on Step  - 2 x daily - 7 x weekly - 3

## 2025-01-14 ENCOUNTER — APPOINTMENT (OUTPATIENT)
Dept: PHYSICAL THERAPY | Age: 55
End: 2025-01-14
Payer: COMMERCIAL

## 2025-01-16 ENCOUNTER — HOSPITAL ENCOUNTER (OUTPATIENT)
Dept: PHYSICAL THERAPY | Age: 55
Setting detail: THERAPIES SERIES
Discharge: HOME | End: 2025-01-16
Payer: COMMERCIAL

## 2025-01-16 PROCEDURE — 97112 NEUROMUSCULAR REEDUCATION: CPT

## 2025-01-16 PROCEDURE — 97110 THERAPEUTIC EXERCISES: CPT

## 2025-01-16 NOTE — FLOWSHEET NOTE
Frequency/Duration: 1x/week for 6 weeks for the following treatment interventions:    Interventions:  Therapeutic Exercise (13566) including: strength training, ROM, and functional mobility  Therapeutic Activities (76839) including: functional mobility training and education.  Neuromuscular Re-education (14663) activation and proprioception, including postural re-education.    Manual Therapy (86416) as indicated to include: Trigger Point Release  Modalities as needed that may include: Biofeedback    Plan: POC initiated as per evaluation    Electronically Signed by Laina Alfaro PT DPT CNS Date: 01/16/2025     Note: Portions of this note have been templated and/or copied from initial evaluation, reassessments and prior notes for documentation efficiency.    Note: If patient does not return for scheduled/recommended follow up visits, this note will serve as a discharge from care along with the most recent update on progress.    Pelvic Floor Evaluation

## 2025-01-18 DIAGNOSIS — E03.9 HYPOTHYROIDISM, UNSPECIFIED TYPE: ICD-10-CM

## 2025-01-20 RX ORDER — LEVOTHYROXINE SODIUM 50 UG/1
TABLET ORAL
Qty: 90 TABLET | Refills: 1 | Status: SHIPPED | OUTPATIENT
Start: 2025-01-20

## 2025-01-20 NOTE — TELEPHONE ENCOUNTER
Medication:   Requested Prescriptions     Pending Prescriptions Disp Refills    levothyroxine (SYNTHROID) 50 MCG tablet [Pharmacy Med Name: LEVOTHYROXINE 0.05MG (50MCG) TAB] 90 tablet 1     Sig: TAKE 1 TABLET BY MOUTH DAILY       Last Filled:  07/22/2024 #90 1rf    Patient Phone Number: [phone] (home)     Last appt: 11/25/2024   Next appt: Visit date not found    Last Thyroid:   Lab Results   Component Value Date/Time    TSH 2.06 08/23/2024 08:28 AM    T4FREE 1.4 03/30/2017 11:21 AM

## 2025-01-27 ENCOUNTER — HOSPITAL ENCOUNTER (OUTPATIENT)
Dept: PHYSICAL THERAPY | Age: 55
Setting detail: THERAPIES SERIES
Discharge: HOME | End: 2025-01-27
Payer: COMMERCIAL

## 2025-01-27 PROCEDURE — 97110 THERAPEUTIC EXERCISES: CPT

## 2025-01-27 PROCEDURE — 97112 NEUROMUSCULAR REEDUCATION: CPT

## 2025-01-27 NOTE — FLOWSHEET NOTE
minutes # CPT Code (UNTIMED) #     Therex (38597)  14 1  EVAL:LOW (29716 - Typically 20 minutes face-to-face)     Neuromusc. Re-ed (28568) 25 2  Re-Eval (33988)     Manual (04585)    Estim Unattended (65597)     Ther. Act (92253)    Mech. Traction (49869)     Gait (86450)    Dry Needle 1-2 muscle (96866)     Aquatic Therex (00805)    Dry Needle 3+ muscle (20561)     Iontophoresis (74118)    VASO (28988)     Ultrasound (49015)    Group Therapy (21262)     Estim Attended (09218)    Canalith Repositioning (30129)     Physical Performance Test (45627)         Other:    Other:    Total Timed Code Tx Minutes 39 3       Total Treatment Minutes 39        Charge Justification:  (00173) THERAPEUTIC EXERCISE - Provided verbal/tactile cueing for activities related to strengthening, flexibility, endurance, ROM performed to prevent loss of range of motion, maintain or improve muscular strength or increase flexibility, following either an injury or surgery.   (22263) HOME EXERCISE PROGRAM - Reviewed/Progressed HEP activities related to strengthening, flexibility, endurance, ROM performed to prevent loss of range of motion, maintain or improve muscular strength or increase flexibility, following either an injury or surgery.  (46471) NEUROMUSCULAR RE-EDUCATION - Therapeutic procedure, 1 or more areas, each 15 minutes; neuromuscular reeducation of movement, balance, coordination, kinesthetic sense, posture, and/or proprioception for sitting and/or standing activities      GOALS     Patient stated goal: decrease urge to go to the bathroom  [] Progressing: [] Met: [] Not Met: [] Adjusted      Therapist goals for Patient:   Short Term Goals: To be achieved in: 2 weeks  1. Independent in HEP and progression per patient tolerance, in order to prevent future occurrence of presenting issue.  [] Progressing: [] Met: [] Not Met: [] Adjusted  2. Patient will have a decrease in pain/urination to indicate improvement in pelvic floor strength and

## 2025-02-27 SDOH — ECONOMIC STABILITY: FOOD INSECURITY: WITHIN THE PAST 12 MONTHS, THE FOOD YOU BOUGHT JUST DIDN'T LAST AND YOU DIDN'T HAVE MONEY TO GET MORE.: NEVER TRUE

## 2025-02-27 SDOH — ECONOMIC STABILITY: FOOD INSECURITY: WITHIN THE PAST 12 MONTHS, YOU WORRIED THAT YOUR FOOD WOULD RUN OUT BEFORE YOU GOT MONEY TO BUY MORE.: NEVER TRUE

## 2025-02-27 SDOH — ECONOMIC STABILITY: INCOME INSECURITY: IN THE LAST 12 MONTHS, WAS THERE A TIME WHEN YOU WERE NOT ABLE TO PAY THE MORTGAGE OR RENT ON TIME?: NO

## 2025-02-27 ASSESSMENT — PATIENT HEALTH QUESTIONNAIRE - PHQ9
SUM OF ALL RESPONSES TO PHQ QUESTIONS 1-9: 0
SUM OF ALL RESPONSES TO PHQ QUESTIONS 1-9: 0
1. LITTLE INTEREST OR PLEASURE IN DOING THINGS: NOT AT ALL
2. FEELING DOWN, DEPRESSED OR HOPELESS: NOT AT ALL
1. LITTLE INTEREST OR PLEASURE IN DOING THINGS: NOT AT ALL
2. FEELING DOWN, DEPRESSED OR HOPELESS: NOT AT ALL
SUM OF ALL RESPONSES TO PHQ QUESTIONS 1-9: 0
SUM OF ALL RESPONSES TO PHQ QUESTIONS 1-9: 0
SUM OF ALL RESPONSES TO PHQ9 QUESTIONS 1 & 2: 0

## 2025-02-28 ENCOUNTER — OFFICE VISIT (OUTPATIENT)
Dept: FAMILY MEDICINE CLINIC | Age: 55
End: 2025-02-28
Payer: COMMERCIAL

## 2025-02-28 VITALS
BODY MASS INDEX: 44.28 KG/M2 | DIASTOLIC BLOOD PRESSURE: 98 MMHG | OXYGEN SATURATION: 92 % | WEIGHT: 259.4 LBS | SYSTOLIC BLOOD PRESSURE: 136 MMHG | HEART RATE: 65 BPM | HEIGHT: 64 IN | TEMPERATURE: 96.8 F

## 2025-02-28 DIAGNOSIS — E66.01 MORBID OBESITY WITH BMI OF 40.0-44.9, ADULT: ICD-10-CM

## 2025-02-28 DIAGNOSIS — E66.01 MORBID OBESITY WITH BMI OF 40.0-44.9, ADULT: Primary | ICD-10-CM

## 2025-02-28 DIAGNOSIS — N95.2 PERIMENOPAUSAL ATROPHIC VAGINITIS: ICD-10-CM

## 2025-02-28 DIAGNOSIS — E28.2 PCOS (POLYCYSTIC OVARIAN SYNDROME): ICD-10-CM

## 2025-02-28 DIAGNOSIS — R03.0 ELEVATED BLOOD PRESSURE READING: ICD-10-CM

## 2025-02-28 DIAGNOSIS — N95.1 PERIMENOPAUSAL VASOMOTOR SYMPTOMS: ICD-10-CM

## 2025-02-28 LAB
ALBUMIN SERPL-MCNC: 4.2 G/DL (ref 3.4–5)
ALBUMIN/GLOB SERPL: 1.8 {RATIO} (ref 1.1–2.2)
ALP SERPL-CCNC: 80 U/L (ref 40–129)
ALT SERPL-CCNC: 23 U/L (ref 10–40)
ANION GAP SERPL CALCULATED.3IONS-SCNC: 9 MMOL/L (ref 3–16)
AST SERPL-CCNC: 25 U/L (ref 15–37)
BILIRUB SERPL-MCNC: <0.2 MG/DL (ref 0–1)
BUN SERPL-MCNC: 14 MG/DL (ref 7–20)
CALCIUM SERPL-MCNC: 9.6 MG/DL (ref 8.3–10.6)
CHLORIDE SERPL-SCNC: 106 MMOL/L (ref 99–110)
CHOLEST SERPL-MCNC: 185 MG/DL (ref 0–199)
CO2 SERPL-SCNC: 28 MMOL/L (ref 21–32)
CREAT SERPL-MCNC: 0.6 MG/DL (ref 0.6–1.1)
EST. AVERAGE GLUCOSE BLD GHB EST-MCNC: 111.2 MG/DL
GFR SERPLBLD CREATININE-BSD FMLA CKD-EPI: >90 ML/MIN/{1.73_M2}
GLUCOSE SERPL-MCNC: 96 MG/DL (ref 70–99)
HBA1C MFR BLD: 5.5 %
HDLC SERPL-MCNC: 53 MG/DL (ref 40–60)
LDLC SERPL CALC-MCNC: 112 MG/DL
POTASSIUM SERPL-SCNC: 4.8 MMOL/L (ref 3.5–5.1)
PROT SERPL-MCNC: 6.6 G/DL (ref 6.4–8.2)
SODIUM SERPL-SCNC: 143 MMOL/L (ref 136–145)
TRIGL SERPL-MCNC: 100 MG/DL (ref 0–150)
VLDLC SERPL CALC-MCNC: 20 MG/DL

## 2025-02-28 PROCEDURE — G8427 DOCREV CUR MEDS BY ELIG CLIN: HCPCS | Performed by: FAMILY MEDICINE

## 2025-02-28 PROCEDURE — 1036F TOBACCO NON-USER: CPT | Performed by: FAMILY MEDICINE

## 2025-02-28 PROCEDURE — 3017F COLORECTAL CA SCREEN DOC REV: CPT | Performed by: FAMILY MEDICINE

## 2025-02-28 PROCEDURE — 99214 OFFICE O/P EST MOD 30 MIN: CPT | Performed by: FAMILY MEDICINE

## 2025-02-28 PROCEDURE — G8417 CALC BMI ABV UP PARAM F/U: HCPCS | Performed by: FAMILY MEDICINE

## 2025-02-28 RX ORDER — CONJUGATED ESTROGENS 0.62 MG/G
CREAM VAGINAL
Qty: 30 G | Refills: 5 | Status: SHIPPED | OUTPATIENT
Start: 2025-02-28

## 2025-02-28 NOTE — PROGRESS NOTES
reading  New. Uncontrolled. Blood pressure should be around 130/80. Check home bp    Return in about 3 months (around 5/28/2025) for weight follow up.    Electronically signed by Vandana Mora MD on 2/28/2025 at 5:54 PM.

## 2025-02-28 NOTE — PATIENT INSTRUCTIONS
Dr. Von Quiros,  gyn for hormone replacement therapy    Intermittent fasting or timed restricted eating. The obesity code    Blood pressure should be around 130/80

## 2025-03-01 ENCOUNTER — HOSPITAL ENCOUNTER (OUTPATIENT)
Dept: WOMENS IMAGING | Age: 55
Discharge: HOME | End: 2025-03-01
Payer: COMMERCIAL

## 2025-03-01 VITALS — BODY MASS INDEX: 43.54 KG/M2 | WEIGHT: 255 LBS | HEIGHT: 64 IN

## 2025-03-01 DIAGNOSIS — Z12.31 SCREENING MAMMOGRAM FOR BREAST CANCER: ICD-10-CM

## 2025-03-01 PROCEDURE — 77067 SCR MAMMO BI INCL CAD: CPT

## 2025-03-04 PROBLEM — R03.0 ELEVATED BLOOD PRESSURE READING: Status: ACTIVE | Noted: 2025-03-04

## 2025-04-26 ENCOUNTER — APPOINTMENT (OUTPATIENT)
Dept: GENERAL RADIOLOGY | Age: 55
End: 2025-04-26
Payer: COMMERCIAL

## 2025-04-26 ENCOUNTER — HOSPITAL ENCOUNTER (EMERGENCY)
Age: 55
Discharge: HOME | End: 2025-04-26
Payer: COMMERCIAL

## 2025-04-26 VITALS
SYSTOLIC BLOOD PRESSURE: 118 MMHG | BODY MASS INDEX: 40.97 KG/M2 | TEMPERATURE: 98.5 F | HEIGHT: 64 IN | DIASTOLIC BLOOD PRESSURE: 87 MMHG | OXYGEN SATURATION: 94 % | WEIGHT: 240 LBS | HEART RATE: 99 BPM | RESPIRATION RATE: 16 BRPM

## 2025-04-26 DIAGNOSIS — M25.561 ACUTE PAIN OF RIGHT KNEE: Primary | ICD-10-CM

## 2025-04-26 DIAGNOSIS — M25.00 HEMARTHROSIS: ICD-10-CM

## 2025-04-26 PROCEDURE — 20610 DRAIN/INJ JOINT/BURSA W/O US: CPT

## 2025-04-26 PROCEDURE — 96372 THER/PROPH/DIAG INJ SC/IM: CPT

## 2025-04-26 PROCEDURE — 73560 X-RAY EXAM OF KNEE 1 OR 2: CPT

## 2025-04-26 PROCEDURE — 6370000000 HC RX 637 (ALT 250 FOR IP): Performed by: PHYSICIAN ASSISTANT

## 2025-04-26 PROCEDURE — 6360000002 HC RX W HCPCS: Performed by: PHYSICIAN ASSISTANT

## 2025-04-26 PROCEDURE — 99284 EMERGENCY DEPT VISIT MOD MDM: CPT

## 2025-04-26 RX ORDER — KETOROLAC TROMETHAMINE 30 MG/ML
30 INJECTION, SOLUTION INTRAMUSCULAR; INTRAVENOUS ONCE
Status: COMPLETED | OUTPATIENT
Start: 2025-04-26 | End: 2025-04-26

## 2025-04-26 RX ORDER — TRAMADOL HYDROCHLORIDE 50 MG/1
50 TABLET ORAL EVERY 6 HOURS PRN
Qty: 10 TABLET | Refills: 0 | Status: SHIPPED | OUTPATIENT
Start: 2025-04-26 | End: 2025-04-26

## 2025-04-26 RX ORDER — TRAMADOL HYDROCHLORIDE 50 MG/1
50 TABLET ORAL ONCE
Status: COMPLETED | OUTPATIENT
Start: 2025-04-26 | End: 2025-04-26

## 2025-04-26 RX ORDER — TRAMADOL HYDROCHLORIDE 50 MG/1
50 TABLET ORAL EVERY 6 HOURS PRN
Qty: 10 TABLET | Refills: 0 | Status: SHIPPED | OUTPATIENT
Start: 2025-04-26 | End: 2025-04-29

## 2025-04-26 RX ADMIN — TRAMADOL HYDROCHLORIDE 50 MG: 50 TABLET, COATED ORAL at 17:24

## 2025-04-26 RX ADMIN — KETOROLAC TROMETHAMINE 30 MG: 30 INJECTION, SOLUTION INTRAMUSCULAR at 16:20

## 2025-04-26 ASSESSMENT — PAIN DESCRIPTION - ORIENTATION: ORIENTATION: RIGHT

## 2025-04-26 ASSESSMENT — PAIN DESCRIPTION - LOCATION: LOCATION: LEG

## 2025-04-26 ASSESSMENT — PAIN - FUNCTIONAL ASSESSMENT: PAIN_FUNCTIONAL_ASSESSMENT: 0-10

## 2025-04-26 ASSESSMENT — PAIN SCALES - GENERAL: PAINLEVEL_OUTOF10: 8

## 2025-04-26 NOTE — ED PROVIDER NOTES
Arthrocentesis Procedure Note    Indication: Joint pain and joint swelling    Consent: The patient was counseled regarding the procedure, it's indications, risks, potential complications and alternatives and any questions were answered. Consent was obtained.    Procedure: The right knee was positioned appropriately and the landmarks were identified.  Local anesthesia was obtained by infiltration using 1% Lidocaine with epinephrine.  The area was then prepped and draped in the usual sterile fashion.  A needle was then introduced into the joint space at which point 30 cc of bloody fluid was aspirated.  A joint injection was not performed.  The aspirated fluid was discarded..  A sterile dressing was then applied to the site.    The patient tolerated the procedure well.    Complications: None       I did not otherwise participate in the care of this patient     Cristina Venegas PA-C  04/26/25 0728

## 2025-04-27 NOTE — ED PROVIDER NOTES
[facility] EMERGENCY DEPARTMENT  EMERGENCY DEPARTMENT ENCOUNTER        Pt Name: Lise Ferrari  MRN: [id number]  Birthdate 1970  Date of evaluation: 4/26/2025  Provider: KAVON Stock  PCP: Dina Cash MD  Note Started: 3:11 PM EDT 4/27/25      CESAR. I have evaluated this patient.        CHIEF COMPLAINT       Chief Complaint   Patient presents with    Knee Pain     Right knee swelling and pain started about 2pm, no injury        HISTORY OF PRESENT ILLNESS: 1 or more Elements     History From: Patient,   Limitations to history : None    Lise Ferrari is a 55 y.o. female who presents to the evaluation right knee pain.  Patient reports she was doing laundry at home, and was going up and down stairs.  She had sudden pain and swelling to her right knee, and reports difficulty ambulating secondary to pain.  She is able to fully extend it, but not flex very far.  Patient denies known injury.  She reports pretty normal activity yesterday.  No distal swelling or numbness/tingling.  No other acute complaints.    Nursing Notes were all reviewed and agreed with or any disagreements were addressed in the HPI.    REVIEW OF SYSTEMS :      Review of Systems   Constitutional:  Negative for chills, diaphoresis and fever.   HENT:  Negative for congestion, rhinorrhea and sore throat.    Eyes:  Negative for discharge, redness and itching.   Respiratory:  Negative for cough, shortness of breath and stridor.    Cardiovascular:  Negative for chest pain and palpitations.   Gastrointestinal:  Negative for abdominal pain, diarrhea, nausea and vomiting.   Genitourinary:  Negative for dysuria and hematuria.   Musculoskeletal:  Positive for arthralgias and joint swelling. Negative for back pain.   Skin:  Negative for rash.   Neurological:  Negative for syncope, speech difficulty and headaches.       Positives and Pertinent negatives as per HPI.     SURGICAL HISTORY     Past Surgical History:   Procedure

## 2025-04-29 ENCOUNTER — PREP FOR PROCEDURE (OUTPATIENT)
Dept: ORTHOPEDIC SURGERY | Age: 55
End: 2025-04-29

## 2025-04-29 ENCOUNTER — OFFICE VISIT (OUTPATIENT)
Dept: ORTHOPEDIC SURGERY | Age: 55
End: 2025-04-29
Payer: COMMERCIAL

## 2025-04-29 VITALS — HEIGHT: 64 IN | BODY MASS INDEX: 40.97 KG/M2 | WEIGHT: 240 LBS

## 2025-04-29 DIAGNOSIS — M12.20 PVNS (PIGMENTED VILLONODULAR SYNOVITIS): ICD-10-CM

## 2025-04-29 DIAGNOSIS — M25.561 RIGHT KNEE PAIN, UNSPECIFIED CHRONICITY: Primary | ICD-10-CM

## 2025-04-29 PROCEDURE — 99205 OFFICE O/P NEW HI 60 MIN: CPT | Performed by: ORTHOPAEDIC SURGERY

## 2025-04-29 PROCEDURE — 1036F TOBACCO NON-USER: CPT | Performed by: ORTHOPAEDIC SURGERY

## 2025-04-29 PROCEDURE — 3017F COLORECTAL CA SCREEN DOC REV: CPT | Performed by: ORTHOPAEDIC SURGERY

## 2025-04-29 PROCEDURE — G8417 CALC BMI ABV UP PARAM F/U: HCPCS | Performed by: ORTHOPAEDIC SURGERY

## 2025-04-29 PROCEDURE — G8427 DOCREV CUR MEDS BY ELIG CLIN: HCPCS | Performed by: ORTHOPAEDIC SURGERY

## 2025-04-29 NOTE — PROGRESS NOTES
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  The goals of the surgery would be to further evaluate the pathology and significance of the tissue that note was noted on the MRI and also if it does represent PVNS then to perform eradication with a thorough arthroscopic debridement and complete synovectomy.  I did tell the patient that it is possible that tissue does not represent PVNS.  It is also possible that if the tissue is completely eradicated in the joint that it could recur which is always a possibility with PVNS.  Additionally, it is possible that even if we were able to eradicate the tissue and it does not grow back that her patellofemoral degenerative joint disease could become symptomatic.  She is aware of this.  If that is the case it could always be addressed with injections and other conservative measures.  From a surgical standpoint and regarding knee arthroscopy the risk were defined as but not limited to infection, bleeding, damage blood vessels or nerves, need for additional surgery.  Patient does understand elects proceed.    Greater than 60 minutes were spent with this encounter.  Time spent included evaluating the patient's chart prior to arrival.  Evaluating the patient in the office including history, physical examination, imaging reviewing, and counseling on next steps.  Lastly, time was spent discussing orders with my staff as well as providing documentation in the chart.                    Stanley Ndiaye MD            Orthopaedic Surgery Sports Medicine and Arthroscopy            [facility]            Team Physician [facility] ([place]      Disclaimer:  This note was dictated with voice recognition software.  Though review and correction are routine, we apologize for any errors.

## 2025-04-30 ENCOUNTER — OFFICE VISIT (OUTPATIENT)
Dept: FAMILY MEDICINE CLINIC | Age: 55
End: 2025-04-30
Payer: COMMERCIAL

## 2025-04-30 VITALS
DIASTOLIC BLOOD PRESSURE: 98 MMHG | HEIGHT: 64 IN | TEMPERATURE: 97.1 F | OXYGEN SATURATION: 98 % | HEART RATE: 109 BPM | WEIGHT: 241 LBS | SYSTOLIC BLOOD PRESSURE: 126 MMHG | BODY MASS INDEX: 41.15 KG/M2

## 2025-04-30 DIAGNOSIS — R03.0 ELEVATED BLOOD PRESSURE READING: ICD-10-CM

## 2025-04-30 DIAGNOSIS — G89.18 POST-OP PAIN: Primary | ICD-10-CM

## 2025-04-30 DIAGNOSIS — M12.20 PVNS (PIGMENTED VILLONODULAR SYNOVITIS): ICD-10-CM

## 2025-04-30 DIAGNOSIS — Z01.810 PREOPERATIVE CARDIOVASCULAR EXAMINATION: Primary | ICD-10-CM

## 2025-04-30 PROCEDURE — 3017F COLORECTAL CA SCREEN DOC REV: CPT | Performed by: FAMILY MEDICINE

## 2025-04-30 PROCEDURE — G8427 DOCREV CUR MEDS BY ELIG CLIN: HCPCS | Performed by: FAMILY MEDICINE

## 2025-04-30 PROCEDURE — G8417 CALC BMI ABV UP PARAM F/U: HCPCS | Performed by: FAMILY MEDICINE

## 2025-04-30 PROCEDURE — 1036F TOBACCO NON-USER: CPT | Performed by: FAMILY MEDICINE

## 2025-04-30 PROCEDURE — 99213 OFFICE O/P EST LOW 20 MIN: CPT | Performed by: FAMILY MEDICINE

## 2025-04-30 RX ORDER — SODIUM CHLORIDE 0.9 % (FLUSH) 0.9 %
5-40 SYRINGE (ML) INJECTION PRN
Status: CANCELLED | OUTPATIENT
Start: 2025-04-30

## 2025-04-30 RX ORDER — ASPIRIN 325 MG
325 TABLET ORAL DAILY
Qty: 14 TABLET | Refills: 0 | Status: SHIPPED | OUTPATIENT
Start: 2025-05-02

## 2025-04-30 RX ORDER — ONDANSETRON 4 MG/1
4 TABLET, FILM COATED ORAL EVERY 8 HOURS PRN
Qty: 21 TABLET | Refills: 0 | Status: SHIPPED | OUTPATIENT
Start: 2025-05-02

## 2025-04-30 RX ORDER — HYDROCODONE BITARTRATE AND ACETAMINOPHEN 5; 325 MG/1; MG/1
1 TABLET ORAL EVERY 4 HOURS PRN
Qty: 30 TABLET | Refills: 0 | Status: SHIPPED | OUTPATIENT
Start: 2025-05-02 | End: 2025-05-07

## 2025-04-30 RX ORDER — SODIUM CHLORIDE 0.9 % (FLUSH) 0.9 %
5-40 SYRINGE (ML) INJECTION EVERY 12 HOURS SCHEDULED
Status: CANCELLED | OUTPATIENT
Start: 2025-04-30

## 2025-04-30 RX ORDER — SODIUM CHLORIDE 9 MG/ML
INJECTION, SOLUTION INTRAVENOUS PRN
Status: CANCELLED | OUTPATIENT
Start: 2025-04-30

## 2025-04-30 NOTE — PROGRESS NOTES
Chief complaint: Pre-op Exam (Pre-Op visit for surgery on ; knee. [facility] hospital; CARE GAP (Polio vaccine)?)      SUBJECTIVE:  CARO Ferrari (:  1970) is a 55 y.o. female who presents with a chief complaint of:  pre op for Rt knee surgery on  with Dr. Ndiaye. No injury - it blew up like a balloon and was super painful. MRI showed PVNS- plan for Rt knee arthroscopy to clean it out.     Blood pressure is high today. She's in a lot of pain and can't take any NSAIDs prior to surgery on . She could take tylenol but hasn't today. Normal bp in ER on . Has never checked bp at home.    No hx of complications from anesthesia or hx of blood clots.    Review of Systems:  General: No F/C/NS/fatigue/wt loss   Cardiovascular: No CP  Respiratory: No SOB  GI: No N/V/D/C/abd pain/blood in stool  Neuro: No HA/weakness  Psych: No depressed mood/anxiety  Musculoskeletal: No myalgias    Past Medical History:   Diagnosis Date    Allergy-induced asthma 2014    AR (allergic rhinitis)     Eczema     Morbid obesity with BMI of 40.0-44.9, adult (HCC)     PCO (polycystic ovaries)     Prolonged emergence from general anesthesia     Unspecified hypothyroidism      Current Outpatient Medications   Medication Sig Dispense Refill    [START ON 2025] HYDROcodone-acetaminophen (NORCO) 5-325 MG per tablet Take 1 tablet by mouth every 4 hours as needed for Pain for up to 5 days. Intended supply: 5 days. Take lowest dose possible to manage pain Max Daily Amount: 6 tablets 30 tablet 0    [START ON 2025] aspirin 325 MG tablet Take 1 tablet by mouth daily 14 tablet 0    [START ON 2025] ondansetron (ZOFRAN) 4 MG tablet Take 1 tablet by mouth every 8 hours as needed for Nausea 21 tablet 0    levothyroxine (SYNTHROID) 50 MCG tablet TAKE 1 TABLET BY MOUTH DAILY 90 tablet 1    albuterol sulfate HFA (VENTOLIN HFA) 108 (90 Base) MCG/ACT inhaler Inhale 2 puffs into the lungs every 6 hours as needed for Wheezing 1 each 3

## 2025-04-30 NOTE — TELEPHONE ENCOUNTER
Spoke with patient. Informed of surgery time and arrival time. Pended meds for surgery and placed PT orders.

## 2025-04-30 NOTE — PROGRESS NOTES
Preop instructions sent via [employer]. Patient informed.    Instructions given:    Verbal yes____    Voicemail ____

## 2025-05-01 NOTE — DISCHARGE INSTRUCTIONS
Orthopedic Surgery Discharge Instructions    Medications:   A pain medication has been prescribed for you.  Please take this as instructed by the pharmacist.  An anti-nausea medication has been prescribed for you to use as needed for nausea.  Either aspirin or a blood thinner medication may have been prescribed for you.  Please take this as instructed.    Activity:  Weightbearing as tolerated with crutches as needed.    Incision/dressings:  You may remove your dressing in 72 hours.  Until then the dressing needs to remain clean and dry.  Following removal dressing please apply dry dressing daily.  You may shower in 72 hours and allow the water to run over the incision.  However no submerging underwater or getting in pools.    Follow-up:  If you do not already have a postoperative follow-up appointment scheduled you should call to schedule an appointment within 10 to 14 days of surgery.    Emergency or after hours questions:  Please call the main call center at [phone] for all questions or concerns during evening and weekend hours.  The call center will direct your call to the on-call physician to answer your questions and concerns. During weekly office hours you may call my assistant, Susan, at [phone].                           Stanley Ndiaye MD            Orthopaedic Surgery Sports Medicine and Arthroscopy            [facility]            Team Physician [facility])      ANESTHESIA DISCHARGE INSTRUCTIONS    Wear your seatbelt home.  You are under the influence of drugs-do not drink alcohol,drive,operate machinery,or make any important decisions or sign any legal documentsfor 24 hours  A responsible adult needs to be with you for 24 hours.  You may experience lightheadedness,dizziness,or sleepiness following surgery.  Rest at home today- increase activity

## 2025-05-02 ENCOUNTER — ANESTHESIA EVENT (OUTPATIENT)
Dept: OPERATING ROOM | Age: 55
End: 2025-05-02
Payer: COMMERCIAL

## 2025-05-02 ENCOUNTER — HOSPITAL ENCOUNTER (OUTPATIENT)
Age: 55
Setting detail: OUTPATIENT SURGERY
Discharge: HOME | End: 2025-05-02
Attending: ORTHOPAEDIC SURGERY | Admitting: ORTHOPAEDIC SURGERY
Payer: COMMERCIAL

## 2025-05-02 ENCOUNTER — ANESTHESIA (OUTPATIENT)
Dept: OPERATING ROOM | Age: 55
End: 2025-05-02
Payer: COMMERCIAL

## 2025-05-02 VITALS
BODY MASS INDEX: 40.84 KG/M2 | OXYGEN SATURATION: 94 % | HEART RATE: 78 BPM | HEIGHT: 64 IN | TEMPERATURE: 97.2 F | RESPIRATION RATE: 14 BRPM | WEIGHT: 239.2 LBS | DIASTOLIC BLOOD PRESSURE: 69 MMHG | SYSTOLIC BLOOD PRESSURE: 116 MMHG

## 2025-05-02 DIAGNOSIS — M12.20 PVNS (PIGMENTED VILLONODULAR SYNOVITIS): ICD-10-CM

## 2025-05-02 LAB — HCG UR QL: NEGATIVE

## 2025-05-02 PROCEDURE — 6360000002 HC RX W HCPCS: Performed by: REGISTERED NURSE

## 2025-05-02 PROCEDURE — 7100000010 HC PHASE II RECOVERY - FIRST 15 MIN: Performed by: ORTHOPAEDIC SURGERY

## 2025-05-02 PROCEDURE — 3600000014 HC SURGERY LEVEL 4 ADDTL 15MIN: Performed by: ORTHOPAEDIC SURGERY

## 2025-05-02 PROCEDURE — 3700000001 HC ADD 15 MINUTES (ANESTHESIA): Performed by: ORTHOPAEDIC SURGERY

## 2025-05-02 PROCEDURE — 2580000003 HC RX 258: Performed by: REGISTERED NURSE

## 2025-05-02 PROCEDURE — 7100000000 HC PACU RECOVERY - FIRST 15 MIN: Performed by: ORTHOPAEDIC SURGERY

## 2025-05-02 PROCEDURE — 84703 CHORIONIC GONADOTROPIN ASSAY: CPT

## 2025-05-02 PROCEDURE — 2500000003 HC RX 250 WO HCPCS: Performed by: ORTHOPAEDIC SURGERY

## 2025-05-02 PROCEDURE — 6360000002 HC RX W HCPCS: Performed by: STUDENT IN AN ORGANIZED HEALTH CARE EDUCATION/TRAINING PROGRAM

## 2025-05-02 PROCEDURE — 6360000002 HC RX W HCPCS: Performed by: ORTHOPAEDIC SURGERY

## 2025-05-02 PROCEDURE — 3700000000 HC ANESTHESIA ATTENDED CARE: Performed by: ORTHOPAEDIC SURGERY

## 2025-05-02 PROCEDURE — 6370000000 HC RX 637 (ALT 250 FOR IP): Performed by: STUDENT IN AN ORGANIZED HEALTH CARE EDUCATION/TRAINING PROGRAM

## 2025-05-02 PROCEDURE — 2580000003 HC RX 258: Performed by: ORTHOPAEDIC SURGERY

## 2025-05-02 PROCEDURE — 7100000011 HC PHASE II RECOVERY - ADDTL 15 MIN: Performed by: ORTHOPAEDIC SURGERY

## 2025-05-02 PROCEDURE — 3600000004 HC SURGERY LEVEL 4 BASE: Performed by: ORTHOPAEDIC SURGERY

## 2025-05-02 PROCEDURE — 2709999900 HC NON-CHARGEABLE SUPPLY: Performed by: ORTHOPAEDIC SURGERY

## 2025-05-02 PROCEDURE — 7100000001 HC PACU RECOVERY - ADDTL 15 MIN: Performed by: ORTHOPAEDIC SURGERY

## 2025-05-02 PROCEDURE — 2720000010 HC SURG SUPPLY STERILE: Performed by: ORTHOPAEDIC SURGERY

## 2025-05-02 PROCEDURE — 88305 TISSUE EXAM BY PATHOLOGIST: CPT

## 2025-05-02 RX ORDER — SODIUM CHLORIDE 9 MG/ML
INJECTION, SOLUTION INTRAVENOUS PRN
Status: DISCONTINUED | OUTPATIENT
Start: 2025-05-02 | End: 2025-05-02 | Stop reason: HOSPADM

## 2025-05-02 RX ORDER — BUPIVACAINE HYDROCHLORIDE 5 MG/ML
INJECTION, SOLUTION EPIDURAL; INTRACAUDAL; PERINEURAL
Status: DISCONTINUED | OUTPATIENT
Start: 2025-05-02 | End: 2025-05-02 | Stop reason: ALTCHOICE

## 2025-05-02 RX ORDER — SODIUM CHLORIDE 9 MG/ML
INJECTION, SOLUTION INTRAVENOUS
Status: DISCONTINUED | OUTPATIENT
Start: 2025-05-02 | End: 2025-05-02 | Stop reason: SDUPTHER

## 2025-05-02 RX ORDER — SODIUM CHLORIDE 0.9 % (FLUSH) 0.9 %
5-40 SYRINGE (ML) INJECTION PRN
Status: DISCONTINUED | OUTPATIENT
Start: 2025-05-02 | End: 2025-05-02 | Stop reason: HOSPADM

## 2025-05-02 RX ORDER — MIDAZOLAM HYDROCHLORIDE 1 MG/ML
INJECTION, SOLUTION INTRAMUSCULAR; INTRAVENOUS
Status: DISCONTINUED | OUTPATIENT
Start: 2025-05-02 | End: 2025-05-02 | Stop reason: SDUPTHER

## 2025-05-02 RX ORDER — OXYCODONE HYDROCHLORIDE 5 MG/1
5 TABLET ORAL PRN
Status: COMPLETED | OUTPATIENT
Start: 2025-05-02 | End: 2025-05-02

## 2025-05-02 RX ORDER — SODIUM CHLORIDE 0.9 % (FLUSH) 0.9 %
5-40 SYRINGE (ML) INJECTION EVERY 12 HOURS SCHEDULED
Status: DISCONTINUED | OUTPATIENT
Start: 2025-05-02 | End: 2025-05-02 | Stop reason: HOSPADM

## 2025-05-02 RX ORDER — ONDANSETRON 2 MG/ML
INJECTION INTRAMUSCULAR; INTRAVENOUS
Status: DISCONTINUED | OUTPATIENT
Start: 2025-05-02 | End: 2025-05-02 | Stop reason: SDUPTHER

## 2025-05-02 RX ORDER — FENTANYL CITRATE 50 UG/ML
25 INJECTION, SOLUTION INTRAMUSCULAR; INTRAVENOUS EVERY 5 MIN PRN
Status: COMPLETED | OUTPATIENT
Start: 2025-05-02 | End: 2025-05-02

## 2025-05-02 RX ORDER — FENTANYL CITRATE 50 UG/ML
INJECTION, SOLUTION INTRAMUSCULAR; INTRAVENOUS
Status: DISCONTINUED | OUTPATIENT
Start: 2025-05-02 | End: 2025-05-02 | Stop reason: SDUPTHER

## 2025-05-02 RX ORDER — NALOXONE HYDROCHLORIDE 0.4 MG/ML
INJECTION, SOLUTION INTRAMUSCULAR; INTRAVENOUS; SUBCUTANEOUS PRN
Status: DISCONTINUED | OUTPATIENT
Start: 2025-05-02 | End: 2025-05-02 | Stop reason: HOSPADM

## 2025-05-02 RX ORDER — MEPERIDINE HYDROCHLORIDE 25 MG/ML
12.5 INJECTION INTRAMUSCULAR; INTRAVENOUS; SUBCUTANEOUS EVERY 5 MIN PRN
Status: DISCONTINUED | OUTPATIENT
Start: 2025-05-02 | End: 2025-05-02 | Stop reason: HOSPADM

## 2025-05-02 RX ORDER — OXYCODONE HYDROCHLORIDE 5 MG/1
10 TABLET ORAL PRN
Status: COMPLETED | OUTPATIENT
Start: 2025-05-02 | End: 2025-05-02

## 2025-05-02 RX ORDER — DEXAMETHASONE SODIUM PHOSPHATE 4 MG/ML
INJECTION, SOLUTION INTRA-ARTICULAR; INTRALESIONAL; INTRAMUSCULAR; INTRAVENOUS; SOFT TISSUE
Status: DISCONTINUED | OUTPATIENT
Start: 2025-05-02 | End: 2025-05-02 | Stop reason: SDUPTHER

## 2025-05-02 RX ORDER — LIDOCAINE HYDROCHLORIDE 20 MG/ML
INJECTION, SOLUTION EPIDURAL; INFILTRATION; INTRACAUDAL; PERINEURAL
Status: DISCONTINUED | OUTPATIENT
Start: 2025-05-02 | End: 2025-05-02 | Stop reason: SDUPTHER

## 2025-05-02 RX ORDER — PROPOFOL 10 MG/ML
INJECTION, EMULSION INTRAVENOUS
Status: DISCONTINUED | OUTPATIENT
Start: 2025-05-02 | End: 2025-05-02 | Stop reason: SDUPTHER

## 2025-05-02 RX ORDER — PROCHLORPERAZINE EDISYLATE 5 MG/ML
5 INJECTION INTRAMUSCULAR; INTRAVENOUS
Status: DISCONTINUED | OUTPATIENT
Start: 2025-05-02 | End: 2025-05-02 | Stop reason: HOSPADM

## 2025-05-02 RX ADMIN — ONDANSETRON 4 MG: 2 INJECTION INTRAMUSCULAR; INTRAVENOUS at 07:05

## 2025-05-02 RX ADMIN — CEFAZOLIN 2000 MG: 2 INJECTION, POWDER, FOR SOLUTION INTRAMUSCULAR; INTRAVENOUS at 07:05

## 2025-05-02 RX ADMIN — LIDOCAINE HYDROCHLORIDE 100 MG: 20 INJECTION, SOLUTION EPIDURAL; INFILTRATION; INTRACAUDAL; PERINEURAL at 07:00

## 2025-05-02 RX ADMIN — FENTANYL CITRATE 25 MCG: 50 INJECTION INTRAMUSCULAR; INTRAVENOUS at 08:13

## 2025-05-02 RX ADMIN — OXYCODONE 10 MG: 5 TABLET ORAL at 08:47

## 2025-05-02 RX ADMIN — MIDAZOLAM 2 MG: 1 INJECTION INTRAMUSCULAR; INTRAVENOUS at 06:54

## 2025-05-02 RX ADMIN — FENTANYL CITRATE 25 MCG: 50 INJECTION INTRAMUSCULAR; INTRAVENOUS at 08:07

## 2025-05-02 RX ADMIN — FENTANYL CITRATE 50 MCG: 50 INJECTION, SOLUTION INTRAMUSCULAR; INTRAVENOUS at 07:00

## 2025-05-02 RX ADMIN — SODIUM CHLORIDE: 9 INJECTION, SOLUTION INTRAVENOUS at 06:54

## 2025-05-02 RX ADMIN — SODIUM CHLORIDE: 0.9 INJECTION, SOLUTION INTRAVENOUS at 05:53

## 2025-05-02 RX ADMIN — PROPOFOL 200 MG: 10 INJECTION, EMULSION INTRAVENOUS at 07:00

## 2025-05-02 RX ADMIN — DEXAMETHASONE SODIUM PHOSPHATE 4 MG: 4 INJECTION, SOLUTION INTRAMUSCULAR; INTRAVENOUS at 07:05

## 2025-05-02 RX ADMIN — FENTANYL CITRATE 50 MCG: 50 INJECTION, SOLUTION INTRAMUSCULAR; INTRAVENOUS at 07:13

## 2025-05-02 ASSESSMENT — PAIN - FUNCTIONAL ASSESSMENT
PAIN_FUNCTIONAL_ASSESSMENT: PREVENTS OR INTERFERES WITH ALL ACTIVE AND SOME PASSIVE ACTIVITIES
PAIN_FUNCTIONAL_ASSESSMENT: 0-10
PAIN_FUNCTIONAL_ASSESSMENT: 0-10

## 2025-05-02 ASSESSMENT — PAIN DESCRIPTION - LOCATION
LOCATION: KNEE

## 2025-05-02 ASSESSMENT — PAIN SCALES - GENERAL
PAINLEVEL_OUTOF10: 7
PAINLEVEL_OUTOF10: 5
PAINLEVEL_OUTOF10: 7

## 2025-05-02 ASSESSMENT — PAIN DESCRIPTION - ORIENTATION
ORIENTATION: RIGHT

## 2025-05-02 ASSESSMENT — PAIN DESCRIPTION - DESCRIPTORS
DESCRIPTORS: THROBBING;ACHING
DESCRIPTORS: THROBBING

## 2025-05-02 NOTE — ANESTHESIA POSTPROCEDURE EVALUATION
Department of Anesthesiology  Postprocedure Note    Patient: Lise Ferrari  MRN: [id number]  YOB: 1970  Date of evaluation: 5/2/2025    Procedure Summary       Date: 05/02/25 Room / Location: [address]    Anesthesia Start: 0654 Anesthesia Stop: 0748    Procedure: RIGHT KNEE ARTHROSCOPY SYNOVECTOMY, SYNOVIAL BIOPSY (Right: Knee) Diagnosis:       PVNS (pigmented villonodular synovitis)      (PVNS (pigmented villonodular synovitis) [M12.20])    Surgeons: Stanley Ndiaye MD Responsible Provider: Taco Lam DO    Anesthesia Type: general ASA Status: 3            Anesthesia Type: No value filed.    Teresa Phase I: Teresa Score: 8    Teresa Phase II:      Anesthesia Post Evaluation    Patient location during evaluation: PACU  Patient participation: complete - patient participated  Level of consciousness: awake and alert  Airway patency: patent  Nausea & Vomiting: no nausea  Cardiovascular status: hemodynamically stable  Respiratory status: acceptable  Hydration status: stable  Pain management: adequate    No notable events documented.

## 2025-05-02 NOTE — ANESTHESIA PRE PROCEDURE
Department of Anesthesiology  Preprocedure Note       Name:  Lise Ferrari   Age:  55 y.o.  :  1970                                          MRN:  [id number]         Date:  2025      Surgeon: Surgeon(s):  Stanley Ndiaye MD    Procedure: Procedure(s):  RIGHT KNEE ARTHROSCOPY SYNOVECTOMY, SYNOVIAL BIOPSY    Medications prior to admission:   Prior to Admission medications    Medication Sig Start Date End Date Taking? Authorizing Provider   levothyroxine (SYNTHROID) 50 MCG tablet TAKE 1 TABLET BY MOUTH DAILY 25  Yes María Zambrano MD   albuterol sulfate HFA (VENTOLIN HFA) 108 (90 Base) MCG/ACT inhaler Inhale 2 puffs into the lungs every 6 hours as needed for Wheezing 24  Yes Elizabeth Huffman MD   budesonide-formoterol (SYMBICORT) 160-4.5 MCG/ACT AERO Inhale 2 puffs into the lungs 2 times daily 24  Yes Elizabeth Huffman MD   cetirizine (ZYRTEC) 10 MG tablet Take 1 tablet by mouth daily   Yes Shannon Piña MD   HYDROcodone-acetaminophen (NORCO) 5-325 MG per tablet Take 1 tablet by mouth every 4 hours as needed for Pain for up to 5 days. Intended supply: 5 days. Take lowest dose possible to manage pain Max Daily Amount: 6 tablets 25  Stanley Ndiaye MD   aspirin 325 MG tablet Take 1 tablet by mouth daily 25   Stanley Ndiaye MD   ondansetron (ZOFRAN) 4 MG tablet Take 1 tablet by mouth every 8 hours as needed for Nausea 25   Stanley Ndiaye MD   triamcinolone (KENALOG) 0.1 % ointment Apply to affected area BID PRN flares 24   Batsheva Burleson MD   Azelaic Acid (FINACEA) 15 % GEL Apply to affected area daily 22   Batsheva Burleson MD   Vitamin D (CHOLECALCIFEROL) 1000 UNITS CAPS capsule Take 1 capsule by mouth daily    Shannon Piña MD   therapeutic multivitamin-minerals (THERAGRAN-M) tablet Take 1 tablet by mouth daily    Shannon Piña MD       Current medications:    Current Facility-Administered Medications

## 2025-05-05 ENCOUNTER — TELEPHONE (OUTPATIENT)
Dept: ORTHOPEDIC SURGERY | Age: 55
End: 2025-05-05

## 2025-05-05 RX ORDER — CYCLOBENZAPRINE HCL 10 MG
10 TABLET ORAL NIGHTLY PRN
Qty: 10 TABLET | Refills: 0 | Status: SHIPPED | OUTPATIENT
Start: 2025-05-05 | End: 2025-05-15

## 2025-05-05 NOTE — TELEPHONE ENCOUNTER
LVM for patient letting her know I was calling to follow up after her surgery on 5/2/2025. Asked that she call back to let us know how she is doing. Also advised that she needs to contact PT at [phone] to get scheduled for PT, as that should have been scheduled to start today, and she should call ASAP.

## 2025-05-05 NOTE — TELEPHONE ENCOUNTER
Spoke to patient to follow up after surgery on 5/2/2025. She stated overall she is doing well, but is having quad spasms in the operative leg, quad is very tight and painful. She is unable to get into PT until Wednesday morning. Please advise regarding quad spasms.

## 2025-05-06 NOTE — OP NOTE
Operative Note      Patient: Lise Ferrari  YOB: 1970  MRN: [id number]    Date of Procedure: 5/2/2025    Pre-Op Diagnosis Codes:      * PVNS (pigmented villonodular synovitis) [M12.20]    Post-Op Diagnosis: Same       Procedure(s):  RIGHT KNEE ARTHROSCOPY SYNOVECTOMY, SYNOVIAL BIOPSY  Diffuse 3 compartment synovectomy    Surgeon(s):  Stanley Ndiaye MD    Assistant:   Surgical Assistant: Freida Méndez    Anesthesia: General    Estimated Blood Loss (mL): Minimal    Complications: None    Specimens:   ID Type Source Tests Collected by Time Destination   A : A)Right Knee Synovial BX Tissue Tissue SURGICAL PATHOLOGY Stanley Ndiaye MD 5/2/2025 0716        Implants:  * No implants in log *      Drains: * No LDAs found *    Findings:  Infection Present At Time Of Surgery (PATOS) (choose all levels that have infection present):  No infection present  Other Findings: per dictation    Detailed Description of Procedure:   The patient was met in the preoperative holding area.  Surgical site was identified marked.  Informed consent was obtained.  She was taken back to the operative room placed on table supine position.  General esthesia was performed.  Lower extremity was examined and was no evidence of ligamentous instability with full range of motion.  Thigh tourniquet was placed.  Lower extremities prepped and draped.  Formal timeout was performed.  Preoperative antibiotics were given within 30 minutes of the skin incision.    Tourniquet was insufflated to 290 mmHg.  Anterior inferior lateral incision was made.  Trocar was introduced into the notch and then into the patellofemoral joint.  Diagnostic arthroscopy was performed.  Patellofemoral joint did exhibit significant synovitis with significant erythema and discoloration of the soft tissues.  The patellofemoral joint did exhibit chondral degeneration with diffuse grade 1-2 changes.  Arthroscope was introduced into the medial compartment.  Anterior

## 2025-05-07 ENCOUNTER — HOSPITAL ENCOUNTER (OUTPATIENT)
Dept: PHYSICAL THERAPY | Age: 55
Setting detail: THERAPIES SERIES
Discharge: HOME | End: 2025-05-07
Attending: ORTHOPAEDIC SURGERY
Payer: COMMERCIAL

## 2025-05-07 DIAGNOSIS — Z74.09 IMPAIRED FUNCTIONAL MOBILITY, BALANCE, GAIT, AND ENDURANCE: ICD-10-CM

## 2025-05-07 DIAGNOSIS — Z98.890 STATUS POST ARTHROSCOPY OF RIGHT KNEE: Primary | ICD-10-CM

## 2025-05-07 DIAGNOSIS — M62.81 QUADRICEPS WEAKNESS: ICD-10-CM

## 2025-05-07 DIAGNOSIS — M25.661 DECREASED RANGE OF MOTION (ROM) OF RIGHT KNEE: ICD-10-CM

## 2025-05-07 PROCEDURE — 97530 THERAPEUTIC ACTIVITIES: CPT

## 2025-05-07 PROCEDURE — 97016 VASOPNEUMATIC DEVICE THERAPY: CPT

## 2025-05-07 PROCEDURE — 97161 PT EVAL LOW COMPLEX 20 MIN: CPT

## 2025-05-07 PROCEDURE — 97110 THERAPEUTIC EXERCISES: CPT

## 2025-05-07 NOTE — PLAN OF CARE
[facility] - Outpatient Rehabilitation and Therapy: [address], [place] [address] office: [phone] fax: [phone]     Physical Therapy Initial Evaluation Certification  Dear Stanley Ndiaye MD ,    We had the pleasure of evaluating the following patient for physical therapy services at [facility].  A summary of our findings can be found in the initial assessment below.  This includes our plan of care.  If you have any questions or concerns regarding these findings, please do not hesitate to contact me at the office phone number listed above.  Thank you for the referral.     Physician Signature:_______________________________Date:__________________  By signing above (or electronic signature), therapist’s plan is approved by physician     Physical Therapy: TREATMENT/PROGRESS NOTE   Patient: Lise Ferrari (55 y.o. female)   Examination Date: 2025   :  1970 MRN: [id number]   Visit #: 1   Insurance Allowable Auth Needed   [place] []Yes    [x]No    Insurance: Payor: [employer] / Plan: UNITED HE[employer]CARE - CHOICE PLUS / Product Type: *No Product type* /   Insurance ID: [id number] - (Commercial)  Secondary Insurance (if applicable):    Treatment Diagnosis:     ICD-10-CM    1. Status post arthroscopy of right knee  Z98.890       2. Impaired functional mobility, balance, gait, and endurance  Z74.09       3. Quadriceps weakness  M62.81       4. Decreased range of motion (ROM) of right knee  M25.661          Medical Diagnosis:  Post-op pain [G89.18]   Referring Physician: Stanley Ndiaye MD  PCP: Dina Cash MD     Plan of care signed (Y/N):     Date of Patient follow up with Physician:      Plan of Care Report: EVAL today  POC update due: (10 visits /OR AUTH LIMITS, whichever is less)  2025                                             Medical History:  Comorbidities:  Osteoarthritis  Relevant Medical History:

## 2025-05-09 ENCOUNTER — APPOINTMENT (OUTPATIENT)
Dept: PHYSICAL THERAPY | Age: 55
End: 2025-05-09
Attending: ORTHOPAEDIC SURGERY
Payer: COMMERCIAL

## 2025-05-09 ENCOUNTER — HOSPITAL ENCOUNTER (OUTPATIENT)
Dept: PHYSICAL THERAPY | Age: 55
Setting detail: THERAPIES SERIES
Discharge: HOME | End: 2025-05-09
Attending: ORTHOPAEDIC SURGERY
Payer: COMMERCIAL

## 2025-05-09 PROCEDURE — 97016 VASOPNEUMATIC DEVICE THERAPY: CPT

## 2025-05-09 PROCEDURE — 97110 THERAPEUTIC EXERCISES: CPT

## 2025-05-09 PROCEDURE — 97112 NEUROMUSCULAR REEDUCATION: CPT

## 2025-05-09 PROCEDURE — 97140 MANUAL THERAPY 1/> REGIONS: CPT

## 2025-05-09 NOTE — FLOWSHEET NOTE
[facility] - Outpatient Rehabilitation and Therapy: [address], [place] [address] office: [phone] fax: [phone]     Physical Therapy: TREATMENT/PROGRESS NOTE   Patient: Lise Ferrari (55 y.o. female)   Examination Date: 2025   :  1970 MRN: [id number]   Visit #: 2   Insurance Allowable Auth Needed   [place] []Yes    [x]No    Insurance: Payor: UNITED [employer]ARE / Plan: UNITED HEALTHCARE - CHOICE PLUS / Product Type: *No Product type* /   Insurance ID: [id number] - (Commercial)  Secondary Insurance (if applicable):    Treatment Diagnosis:     ICD-10-CM    1. Status post arthroscopy of right knee  Z98.890       2. Impaired functional mobility, balance, gait, and endurance  Z74.09       3. Quadriceps weakness  M62.81       4. Decreased range of motion (ROM) of right knee  M25.661          Medical Diagnosis:  Post-op pain [G89.18]   Referring Physician: Stanley Ndiaye MD  PCP: Dina Cash MD     Plan of care signed (Y/N):     Date of Patient follow up with Physician: 13 May 2025     Plan of Care Report: NO  POC update due: (10 visits /OR AUTH LIMITS, whichever is less)  2025                                             Medical History:  Comorbidities:  Osteoarthritis  Relevant Medical History:                                          Precautions/ Contra-indications:           Latex allergy:  NO  Pacemaker:    NO  Contraindications for Manipulation: NA  Date of Surgery: 25 - R Knee arthroscopy synovectomy, synovial biopsy  Other:    Red Flags:  None    Suicide Screening:   The patient did not verbalize a primary behavioral concern, suicidal ideation, suicidal intent, or demonstrate suicidal behaviors.    Preferred Language for Healthcare:   [x] English       [] other:    SUBJECTIVE EXAMINATION     Patient stated complaint: Pt reports her knee is real swollen this AM. Contributes this to being more active in the house yesterday. Front of her leg feels really

## 2025-05-12 ENCOUNTER — HOSPITAL ENCOUNTER (OUTPATIENT)
Dept: PHYSICAL THERAPY | Age: 55
Setting detail: THERAPIES SERIES
Discharge: HOME | End: 2025-05-12
Attending: ORTHOPAEDIC SURGERY
Payer: COMMERCIAL

## 2025-05-12 PROCEDURE — 97110 THERAPEUTIC EXERCISES: CPT

## 2025-05-12 PROCEDURE — 97112 NEUROMUSCULAR REEDUCATION: CPT

## 2025-05-12 PROCEDURE — 97016 VASOPNEUMATIC DEVICE THERAPY: CPT

## 2025-05-12 NOTE — FLOWSHEET NOTE
[facility] - Outpatient Rehabilitation and Therapy: [address], [place] [address] office: [phone] fax: [phone]     Physical Therapy: TREATMENT/PROGRESS NOTE   Patient: Lise Ferrari (55 y.o. female)   Examination Date: 2025   :  1970 MRN: [id number]   Visit #: 3   Insurance Allowable Auth Needed   MN []Yes    [x]No    Insurance: Payor: [employer]ARE / Plan: UNITED HEALTHCARE - CHOICE PLUS / Product Type: *No Product type* /   Insurance ID: [id number] - (Commercial)  Secondary Insurance (if applicable):    Treatment Diagnosis:     ICD-10-CM    1. Status post arthroscopy of right knee  Z98.890       2. Impaired functional mobility, balance, gait, and endurance  Z74.09       3. Quadriceps weakness  M62.81       4. Decreased range of motion (ROM) of right knee  M25.661          Medical Diagnosis:  Post-op pain [G89.18]   Referring Physician: Stanley Ndiaye MD  PCP: Dina Csah MD     Plan of care signed (Y/N):     Date of Patient follow up with Physician: 13 May 2025     Plan of Care Report: NO  POC update due: (10 visits /OR AUTH LIMITS, whichever is less)  2025                                             Medical History:  Comorbidities:  Osteoarthritis  Relevant Medical History:                                          Precautions/ Contra-indications:           Latex allergy:  NO  Pacemaker:    NO  Contraindications for Manipulation: NA  Date of Surgery: 25 - R Knee arthroscopy synovectomy, synovial biopsy  Other:    Red Flags:  None    Suicide Screening:   The patient did not verbalize a primary behavioral concern, suicidal ideation, suicidal intent, or demonstrate suicidal behaviors.    Preferred Language for Healthcare:   [x] English       [] other:    SUBJECTIVE EXAMINATION     Patient stated complaint: Pt c/o fluctuating R knee edema and discomfort depending on time of day and activity level. Pt rates R knee pain 4/10 which typically

## 2025-05-13 ENCOUNTER — OFFICE VISIT (OUTPATIENT)
Dept: ORTHOPEDIC SURGERY | Age: 55
End: 2025-05-13

## 2025-05-13 VITALS — WEIGHT: 239 LBS | BODY MASS INDEX: 40.8 KG/M2 | HEIGHT: 64 IN

## 2025-05-13 DIAGNOSIS — M25.561 RIGHT KNEE PAIN, UNSPECIFIED CHRONICITY: ICD-10-CM

## 2025-05-13 DIAGNOSIS — M12.20 PVNS (PIGMENTED VILLONODULAR SYNOVITIS): Primary | ICD-10-CM

## 2025-05-13 PROCEDURE — 99024 POSTOP FOLLOW-UP VISIT: CPT | Performed by: ORTHOPAEDIC SURGERY

## 2025-05-13 RX ORDER — METHYLPREDNISOLONE 4 MG/1
TABLET ORAL
Qty: 1 KIT | Refills: 0 | Status: SHIPPED | OUTPATIENT
Start: 2025-05-13

## 2025-05-14 ENCOUNTER — APPOINTMENT (OUTPATIENT)
Dept: PHYSICAL THERAPY | Age: 55
End: 2025-05-14
Attending: ORTHOPAEDIC SURGERY
Payer: COMMERCIAL

## 2025-05-14 ENCOUNTER — HOSPITAL ENCOUNTER (OUTPATIENT)
Dept: PHYSICAL THERAPY | Age: 55
Setting detail: THERAPIES SERIES
Discharge: HOME | End: 2025-05-14
Attending: ORTHOPAEDIC SURGERY
Payer: COMMERCIAL

## 2025-05-14 PROCEDURE — 97016 VASOPNEUMATIC DEVICE THERAPY: CPT

## 2025-05-14 PROCEDURE — 97110 THERAPEUTIC EXERCISES: CPT

## 2025-05-14 PROCEDURE — 97530 THERAPEUTIC ACTIVITIES: CPT

## 2025-05-14 NOTE — FLOWSHEET NOTE
[facility] - Outpatient Rehabilitation and Therapy: [address], [place] [address] office: [phone] fax: [phone]     Physical Therapy: TREATMENT/PROGRESS NOTE   Patient: Lise Ferrari (55 y.o. female)   Examination Date: 2025   :  1970 MRN: [id number]   Visit #: 4   Insurance Allowable Auth Needed   MN []Yes    [x]No    Insurance: Payor: [employer] HE[employer]CARE / Plan: UNITED HEALTHCARE - CHOICE PLUS / Product Type: *No Product type* /   Insurance ID: [id number] - (Commercial)  Secondary Insurance (if applicable):    Treatment Diagnosis:     ICD-10-CM    1. Status post arthroscopy of right knee  Z98.890       2. Impaired functional mobility, balance, gait, and endurance  Z74.09       3. Quadriceps weakness  M62.81       4. Decreased range of motion (ROM) of right knee  M25.661          Medical Diagnosis:  Post-op pain [G89.18]   Referring Physician: Stanley Ndiaye MD  PCP: Dina Cash MD     Plan of care signed (Y/N):     Date of Patient follow up with Physician: 13 May 2025     Plan of Care Report: NO  POC update due: (10 visits /OR AUTH LIMITS, whichever is less)  2025                                             Medical History:  Comorbidities:  Osteoarthritis  Relevant Medical History:                                          Precautions/ Contra-indications:           Latex allergy:  NO  Pacemaker:    NO  Contraindications for Manipulation: NA  Date of Surgery: 25 - R Knee arthroscopy synovectomy, synovial biopsy  Other:    Red Flags:  None    Suicide Screening:   The patient did not verbalize a primary behavioral concern, suicidal ideation, suicidal intent, or demonstrate suicidal behaviors.    Preferred Language for Healthcare:   [x] English       [] other:    SUBJECTIVE EXAMINATION     Patient stated complaint: Pt reports she has been trying to walk \"normal,\" but on occasion her knee gives out on her. Reports pain has improved and she has not

## 2025-05-15 NOTE — PROGRESS NOTES
5/13/2025     Reason for visit:  Status post right knee arthroscopy with diffuse synovectomy on 5/2/2025  Pathology consistent with PVNS    History of Present Illness:  The patient presents for postoperative evaluation.  Overall she is doing reasonably well.  She does have some continued swelling and discomfort but reports that she is better overall.  No fever or chills.  No numbness or tingling.    Objective:  Ht 1.626 m (5' 4\")   Wt 108.4 kg (239 lb)   LMP 11/18/2024   BMI 41.02 kg/m²      Physical Exam:  The patient is well-appearing and in no apparent distress  Examination of the right knee   There is a small effusion, no gross deformity or skin changes  Range of motion reveals 0 to 120  Neg lachman, negative posterior drawer, no pain or laxity with varus or valgus stress at 0 degrees and 30 degrees of flexion  no joint line tenderness  5 out of 5 strength throughout distal muscle groups  Sensation is intact to light touch throughout all distributions  There is no calf swelling or tenderness  Palpable DP pulse, brisk cap refill, 2+ symmetric reflexes     Assessment:  Status post right knee arthroscopy with diffuse synovectomy on 5/2/2025  Pathology consistent with PVNS    Plan:  The patient is doing reasonly well.  Continue physical therapy.  I did tell the patient that at the time of surgery she did have diffuse disease process with significant synovial hypertrophy and inflammation.  She also had signs of chondral degeneration that was rather diffuse.  As result the patient may develop progressive degeneration knee and continued pain.  If that does occur we can always consider injections or ultimately total knee arthroplasty.  At this point she will continue physical therapy and see me in 4 weeks.                   Stanley Nidaye MD            Orthopaedic Surgery Sports Medicine and Arthroscopy            [facility]            Team Physician [place]

## 2025-05-16 ENCOUNTER — APPOINTMENT (OUTPATIENT)
Dept: PHYSICAL THERAPY | Age: 55
End: 2025-05-16
Attending: ORTHOPAEDIC SURGERY
Payer: COMMERCIAL

## 2025-05-16 ENCOUNTER — HOSPITAL ENCOUNTER (OUTPATIENT)
Dept: PHYSICAL THERAPY | Age: 55
Setting detail: THERAPIES SERIES
Discharge: HOME | End: 2025-05-16
Attending: ORTHOPAEDIC SURGERY
Payer: COMMERCIAL

## 2025-05-16 PROCEDURE — 97110 THERAPEUTIC EXERCISES: CPT

## 2025-05-16 PROCEDURE — 97530 THERAPEUTIC ACTIVITIES: CPT

## 2025-05-16 PROCEDURE — 97112 NEUROMUSCULAR REEDUCATION: CPT

## 2025-05-16 NOTE — FLOWSHEET NOTE
[facility] - Outpatient Rehabilitation and Therapy: [address], [place] [address] office: [phone] fax: [phone]     Physical Therapy: TREATMENT/PROGRESS NOTE   Patient: Lise Ferrari (55 y.o. female)   Examination Date: 2025   :  1970 MRN: [id number]   Visit #: 5   Insurance Allowable Auth Needed   MN []Yes    [x]No    Insurance: Payor: UNITED [employer]CARE / Plan: UNITED HEALTHCARE - CHOICE PLUS / Product Type: *No Product type* /   Insurance ID: [id number] - (Commercial)  Secondary Insurance (if applicable):    Treatment Diagnosis:     ICD-10-CM    1. Status post arthroscopy of right knee  Z98.890       2. Impaired functional mobility, balance, gait, and endurance  Z74.09       3. Quadriceps weakness  M62.81       4. Decreased range of motion (ROM) of right knee  M25.661          Medical Diagnosis:  Post-op pain [G89.18]   Referring Physician: Stanley Ndiaye MD  PCP: Dina Cash MD     Plan of care signed (Y/N):     Date of Patient follow up with Physician: 13 May 2025     Plan of Care Report: NO  POC update due: (10 visits /OR AUTH LIMITS, whichever is less)  2025                                             Medical History:  Comorbidities:  Osteoarthritis  Relevant Medical History:                                          Precautions/ Contra-indications:           Latex allergy:  NO  Pacemaker:    NO  Contraindications for Manipulation: NA  Date of Surgery: 25 - R Knee arthroscopy synovectomy, synovial biopsy  Other:    Red Flags:  None    Suicide Screening:   The patient did not verbalize a primary behavioral concern, suicidal ideation, suicidal intent, or demonstrate suicidal behaviors.    Preferred Language for Healthcare:   [x] English       [] other:    SUBJECTIVE EXAMINATION     Patient stated complaint: Pt reports her knee feels very stiff today. Has not been utilizing RW around the house as she has a lot of things to hold onto. Ordered a

## 2025-05-21 ENCOUNTER — APPOINTMENT (OUTPATIENT)
Dept: PHYSICAL THERAPY | Age: 55
End: 2025-05-21
Attending: ORTHOPAEDIC SURGERY
Payer: COMMERCIAL

## 2025-05-21 ENCOUNTER — HOSPITAL ENCOUNTER (OUTPATIENT)
Dept: PHYSICAL THERAPY | Age: 55
Setting detail: THERAPIES SERIES
Discharge: HOME | End: 2025-05-21
Attending: ORTHOPAEDIC SURGERY
Payer: COMMERCIAL

## 2025-05-21 PROCEDURE — 97016 VASOPNEUMATIC DEVICE THERAPY: CPT

## 2025-05-21 PROCEDURE — 97110 THERAPEUTIC EXERCISES: CPT

## 2025-05-21 NOTE — FLOWSHEET NOTE
[facility] - Outpatient Rehabilitation and Therapy: [address], [place] [address] office: [phone] fax: [phone]     Physical Therapy: TREATMENT/PROGRESS NOTE   Patient: Lise Ferrari (55 y.o. female)   Examination Date: 2025   :  1970 MRN: [id number]   Visit #: 6   Insurance Allowable Auth Needed   MN []Yes    [x]No    Insurance: Payor: UNITED HE[employer]CARE / Plan: UNITED HEALTHCARE - CHOICE PLUS / Product Type: *No Product type* /   Insurance ID: [id number] - (Commercial)  Secondary Insurance (if applicable):    Treatment Diagnosis:     ICD-10-CM    1. Status post arthroscopy of right knee  Z98.890       2. Impaired functional mobility, balance, gait, and endurance  Z74.09       3. Quadriceps weakness  M62.81       4. Decreased range of motion (ROM) of right knee  M25.661          Medical Diagnosis:  Post-op pain [G89.18]   Referring Physician: Stanley Ndiaye MD  PCP: Dina Cash MD     Plan of care signed (Y/N):     Date of Patient follow up with Physician: 13 May 2025     Plan of Care Report: NO  POC update due: (10 visits /OR AUTH LIMITS, whichever is less)  2025                                             Medical History:  Comorbidities:  Osteoarthritis  Relevant Medical History:                                          Precautions/ Contra-indications:           Latex allergy:  NO  Pacemaker:    NO  Contraindications for Manipulation: NA  Date of Surgery: 25 - R Knee arthroscopy synovectomy, synovial biopsy  Other:    Red Flags:  None    Suicide Screening:   The patient did not verbalize a primary behavioral concern, suicidal ideation, suicidal intent, or demonstrate suicidal behaviors.    Preferred Language for Healthcare:   [x] English       [] other:    SUBJECTIVE EXAMINATION     Patient stated complaint: Pt reports she went into the office yesterday and has was on her feet a lot. Knee feels very stiff and sore today. Some increased

## 2025-05-23 ENCOUNTER — APPOINTMENT (OUTPATIENT)
Dept: PHYSICAL THERAPY | Age: 55
End: 2025-05-23
Attending: ORTHOPAEDIC SURGERY
Payer: COMMERCIAL

## 2025-05-23 ENCOUNTER — HOSPITAL ENCOUNTER (OUTPATIENT)
Dept: PHYSICAL THERAPY | Age: 55
Setting detail: THERAPIES SERIES
Discharge: HOME | End: 2025-05-23
Attending: ORTHOPAEDIC SURGERY
Payer: COMMERCIAL

## 2025-05-23 PROCEDURE — 97110 THERAPEUTIC EXERCISES: CPT

## 2025-05-23 PROCEDURE — 97530 THERAPEUTIC ACTIVITIES: CPT

## 2025-05-23 PROCEDURE — 97016 VASOPNEUMATIC DEVICE THERAPY: CPT

## 2025-05-23 NOTE — FLOWSHEET NOTE
ROM -4 - 70 deg EOB     5/7/25  ROM/Strength: (Blank cells denote NT)    Mvmt (norm) AROM L AROM R Notes PROM L PROM R Notes     LUMBAR Flex (90)         Ext (25)         SB (25)          Rotation (30)             HIP Flex (120)          Abd (45)          ER (50)          IR (45)          Ext (20)         KNEE Flex (140)     60 deg EOB     Ext (0)     Resting -4      ANKLE DF (20)          PF (50)          Inversion (30)          Eversion (20)             MMT L MMT R Notes       HIP  Flexion   Not tested d/t recent surgery      Abduction        ER        IR        Extension      KNEE  Flexion        Extension        ANKLE  DF        PF        Inversion        Eversion        Repeated Movements: [] Normal  [] Abnormal [] N/A    SLR: unable  Required mod-max assistance from PT for management of operative limb with bed mobility/transfers     Palpation:   R - Poor quad    Patellar Girth Measurements  R: mid: 40.8 cm, supra 45.5   L: mid: 40.8 cm. 43.4     Posture:   decreased WB on R    Bandages/Dressings/Incisions:  Patients wound/incision appears to be healing as expected  Patients wound/incision appears clean, dry and intact  No signs or symptoms indicating infection including: abnormal warmth/heat, streaking redness, pus/colored discharge, or odor    Gait:    Pattern: decreased nessa, decreased step height on right, decreased step length on right, and decreased stance time on right  Assistive Device Used: Rolling walker (RW)    Balance:  [x] WNL      [] NT       [] Dysfunction noted  Comment:     Falls Risk Assessment (30 days):   Falls Risk assessed and no intervention required.  Time Up and Go (TUG):   Not Assessed      Exercises/Interventions     Therapeutic Ex (16604)  resistance Sets/time Reps Notes/Cues/Progressions   NuStep  5'     Recumbent Bike  4'  5/16 - rocking w/ 3\" stretch into flex   Calf Stretch  IB  3x30\"     SL HR (L elevated on 6\")  3x10  5/23          HSC EOB  3x30\"     Standing ADD Stretch

## 2025-05-28 ENCOUNTER — HOSPITAL ENCOUNTER (OUTPATIENT)
Dept: PHYSICAL THERAPY | Age: 55
Setting detail: THERAPIES SERIES
Discharge: HOME | End: 2025-05-28
Attending: ORTHOPAEDIC SURGERY
Payer: COMMERCIAL

## 2025-05-28 PROCEDURE — 97110 THERAPEUTIC EXERCISES: CPT

## 2025-05-28 PROCEDURE — 97530 THERAPEUTIC ACTIVITIES: CPT

## 2025-05-28 PROCEDURE — 97016 VASOPNEUMATIC DEVICE THERAPY: CPT

## 2025-05-28 NOTE — FLOWSHEET NOTE
limited range as well. Reports she has been really trying to walk without the cane at home. Has continued to ice frequently and often. L knee/ankle has been bothering her. Contributes this to favoring it over the R.     IE: Pt reports 5-days post-op. States symptoms came out of nowhere. On  she was standing up when she experienced a great amount of pain and swelling. Went to ED and had surgery . Has not been taking prescription pain meds and has been icing PRN. Biggest complaint is inability to move/use her leg and pain in her quad.      Test used Initial score  2025   Pain Summary VAS 3/10 at rest  8/10 at worst 2-3/10   Functional questionnaire LEFS 10, 87.5% limitation    Other:              Pain:  Pain location: R knee but particularly the quad   Patient describes pain to be constant and aching  Pain decreases with: ice, position changes   Pain increases with: Activity and Movement     Living status: 8 steps to access bed and bath.  available to assist    Current Functional Limitations:    Functional Complaints:  using R leg       PLOF:  No functional limitations  Pt's sleep is affected?   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 Intake form is in the medical record. Patient has been instructed to contact their primary care physician regarding ROS issues if not already being addressed at this time.    [x] Patient history, allergies, meds reviewed. Medical chart reviewed. See intake form.     OBJECTIVE EXAMINATION   :   - TU sec w/ SP cane   - PROM R knee 0-107   - quad avoidance with

## 2025-05-29 ENCOUNTER — TELEPHONE (OUTPATIENT)
Dept: ORTHOPEDIC SURGERY | Age: 55
End: 2025-05-29

## 2025-05-29 NOTE — TELEPHONE ENCOUNTER
Patient calling to see if return to work note can be updated to say \"return to work with no restrictions besides driving\"     And then please fax to [phone] [facility]     [phone]- please call once completed

## 2025-05-30 ENCOUNTER — HOSPITAL ENCOUNTER (OUTPATIENT)
Dept: PHYSICAL THERAPY | Age: 55
Setting detail: THERAPIES SERIES
Discharge: HOME | End: 2025-05-30
Attending: ORTHOPAEDIC SURGERY
Payer: COMMERCIAL

## 2025-05-30 PROCEDURE — 97110 THERAPEUTIC EXERCISES: CPT

## 2025-05-30 PROCEDURE — 97016 VASOPNEUMATIC DEVICE THERAPY: CPT

## 2025-05-30 PROCEDURE — 97530 THERAPEUTIC ACTIVITIES: CPT

## 2025-05-30 NOTE — FLOWSHEET NOTE
exercises in WB     Electronically Signed by Alexandr Luong PT, DPT  Date: 05/30/2025     Note: Portions of this note have been templated and/or copied from initial evaluation, reassessments and prior notes for documentation efficiency.    Note: If patient does not return for scheduled/recommended follow up visits, this note will serve as a discharge from care along with the most recent update on progress.

## 2025-06-04 ENCOUNTER — HOSPITAL ENCOUNTER (OUTPATIENT)
Dept: PHYSICAL THERAPY | Age: 55
Setting detail: THERAPIES SERIES
Discharge: HOME | End: 2025-06-04
Attending: ORTHOPAEDIC SURGERY
Payer: COMMERCIAL

## 2025-06-04 PROCEDURE — 97110 THERAPEUTIC EXERCISES: CPT

## 2025-06-04 PROCEDURE — 97140 MANUAL THERAPY 1/> REGIONS: CPT

## 2025-06-04 RX ORDER — AZELAIC ACID 0.15 G/G
GEL TOPICAL
Qty: 50 G | Refills: 0 | Status: SHIPPED | OUTPATIENT
Start: 2025-06-04

## 2025-06-04 NOTE — FLOWSHEET NOTE
Soft Tissue Mobilization  Modalities as needed that may include: Cryotherapy and Vasoneumatic Compression  Patient education on postural re-education, activity modification, and progression of HEP    Plan: Knee flex ROM, gradual progression of exercises in WB     Electronically Signed by Alexandr Luong PT, DPT  Date: 06/04/2025     Note: Portions of this note have been templated and/or copied from initial evaluation, reassessments and prior notes for documentation efficiency.    Note: If patient does not return for scheduled/recommended follow up visits, this note will serve as a discharge from care along with the most recent update on progress.

## 2025-06-06 ENCOUNTER — HOSPITAL ENCOUNTER (OUTPATIENT)
Dept: PHYSICAL THERAPY | Age: 55
Setting detail: THERAPIES SERIES
Discharge: HOME | End: 2025-06-06
Attending: ORTHOPAEDIC SURGERY
Payer: COMMERCIAL

## 2025-06-06 PROCEDURE — 97110 THERAPEUTIC EXERCISES: CPT

## 2025-06-06 PROCEDURE — 97112 NEUROMUSCULAR REEDUCATION: CPT

## 2025-06-06 NOTE — FLOWSHEET NOTE
[facility] - Outpatient Rehabilitation and Therapy: [address], [place] [address] office: [phone] fax: [phone]     Physical Therapy: TREATMENT/PROGRESS NOTE   Patient: Lise Ferrari (55 y.o. female)   Examination Date: 2025   :  1970 MRN: [id number]   Visit #: 11   Insurance Allowable Auth Needed   [place] []Yes    [x]No    Insurance: Payor: [employer]ARE / Plan: UNITED HE[employer]ARE - CHOICE PLUS / Product Type: *No Product type* /   Insurance ID: [id number] - (Commercial)  Secondary Insurance (if applicable):    Treatment Diagnosis:     ICD-10-CM    1. Status post arthroscopy of right knee  Z98.890       2. Impaired functional mobility, balance, gait, and endurance  Z74.09       3. Quadriceps weakness  M62.81       4. Decreased range of motion (ROM) of right knee  M25.661          Medical Diagnosis:  Post-op pain [G89.18]   Referring Physician: Stanley Ndiaye MD  PCP: Dina Cash MD     Plan of care signed (Y/N):     Date of Patient follow up with Physician: 2025     Plan of Care Report: NO   POC update due: (10 visits /OR AUTH LIMITS, whichever is less)  25 for doc visit                                             Medical History:  Comorbidities:  Osteoarthritis  Relevant Medical History:                                          Precautions/ Contra-indications:           Latex allergy:  NO  Pacemaker:    NO  Contraindications for Manipulation: NA  Date of Surgery: 25 - R Knee arthroscopy synovectomy, synovial biopsy  Other:    Red Flags:  None    Suicide Screening:   The patient did not verbalize a primary behavioral concern, suicidal ideation, suicidal intent, or demonstrate suicidal behaviors.    Preferred Language for Healthcare:   [x] English       [] other:    SUBJECTIVE EXAMINATION     Patient stated complaint: Pt reports her R leg is very sore from last visit. Drove herself and left her cane at home.     IE: Pt reports

## 2025-06-11 ENCOUNTER — HOSPITAL ENCOUNTER (OUTPATIENT)
Dept: PHYSICAL THERAPY | Age: 55
Setting detail: THERAPIES SERIES
Discharge: HOME | End: 2025-06-11
Attending: ORTHOPAEDIC SURGERY
Payer: COMMERCIAL

## 2025-06-11 PROCEDURE — 97110 THERAPEUTIC EXERCISES: CPT

## 2025-06-11 NOTE — PLAN OF CARE
Progression is slowed due to complexities/Impairments listed.  [] Progression has been slowed due to co-morbidities.  [] Plan just implemented, too soon (<30days) to assess goals progression   [] Goals require adjustment due to lack of progress  [] Patient is not progressing as expected and requires additional follow up with physician  [] Other:     TREATMENT PLAN     Frequency/Duration: 2x/week for 6 weeks for the following treatment interventions:     Interventions:  Therapeutic Exercise (19725) including: strength training, ROM, and functional mobility  Therapeutic Activities (87555) including: functional mobility training and education.  Neuromuscular Re-education (26720) activation and proprioception, including postural re-education.    Gait Training (84636) for normalization of ambulation patterns and AD training.   Manual Therapy (98110) as indicated to include: Passive Range of Motion, Gr I-IV mobilizations, and Soft Tissue Mobilization  Modalities as needed that may include: Cryotherapy and Vasoneumatic Compression  Patient education on postural re-education, activity modification, and progression of HEP    Plan: Knee flex ROM, gradual strength progressions in CK     Electronically Signed by Alexandr Luong PT, DPT  Date: 06/11/2025     Note: Portions of this note have been templated and/or copied from initial evaluation, reassessments and prior notes for documentation efficiency.    Note: If patient does not return for scheduled/recommended follow up visits, this note will serve as a discharge from care along with the most recent update on progress.

## 2025-06-12 ENCOUNTER — OFFICE VISIT (OUTPATIENT)
Dept: ORTHOPEDIC SURGERY | Age: 55
End: 2025-06-12

## 2025-06-12 VITALS — HEIGHT: 64 IN | WEIGHT: 239 LBS | BODY MASS INDEX: 40.8 KG/M2

## 2025-06-12 DIAGNOSIS — M12.20 PVNS (PIGMENTED VILLONODULAR SYNOVITIS): Primary | ICD-10-CM

## 2025-06-12 PROCEDURE — 99024 POSTOP FOLLOW-UP VISIT: CPT | Performed by: ORTHOPAEDIC SURGERY

## 2025-06-13 ENCOUNTER — HOSPITAL ENCOUNTER (OUTPATIENT)
Dept: PHYSICAL THERAPY | Age: 55
Setting detail: THERAPIES SERIES
Discharge: HOME | End: 2025-06-13
Attending: ORTHOPAEDIC SURGERY
Payer: COMMERCIAL

## 2025-06-13 PROCEDURE — 97110 THERAPEUTIC EXERCISES: CPT

## 2025-06-13 NOTE — FLOWSHEET NOTE
[facility] - Outpatient Rehabilitation and Therapy: [address], [place] [address] office: [phone] fax: [phone]     Physical Therapy: TREATMENT/PROGRESS NOTE   Patient: Lise Ferrari (55 y.o. female)   Examination Date: 2025   :  1970 MRN: [id number]   Visit #: 13   Insurance Allowable Auth Needed   MN []Yes    [x]No    Insurance: Payor: [employer]ARE / Plan: UNITED HEALTHCARE - CHOICE PLUS / Product Type: *No Product type* /   Insurance ID: [id number] - (Commercial)  Secondary Insurance (if applicable):    Treatment Diagnosis:     ICD-10-CM    1. Status post arthroscopy of right knee  Z98.890       2. Impaired functional mobility, balance, gait, and endurance  Z74.09       3. Quadriceps weakness  M62.81       4. Decreased range of motion (ROM) of right knee  M25.661          Medical Diagnosis:  Post-op pain [G89.18]   Referring Physician: Stanley Ndiaye MD  PCP: Dina Cash MD     Plan of care signed (Y/N):     Date of Patient follow up with Physician: 2025     Plan of Care Report: NO   POC update due: (10 visits /OR AUTH LIMITS, whichever is less)  25                                            Medical History:  Comorbidities:  Osteoarthritis  Relevant Medical History:                                          Precautions/ Contra-indications:           Latex allergy:  NO  Pacemaker:    NO  Contraindications for Manipulation: NA  Date of Surgery: 25 - R Knee arthroscopy synovectomy, synovial biopsy  Other:    Red Flags:  None    Suicide Screening:   The patient did not verbalize a primary behavioral concern, suicidal ideation, suicidal intent, or demonstrate suicidal behaviors.    Preferred Language for Healthcare:   [x] English       [] other:    SUBJECTIVE EXAMINATION     Patient stated complaint: Pt reports her knee is feeling good today. Has been driving herself, with some discomfort with longer distances. Feels as of recent her

## 2025-06-17 NOTE — PROGRESS NOTES
6/12/2025     Reason for visit:  Status post right knee arthroscopy with diffuse synovectomy on 5/2/2025  Pathology consistent with PVNS    History of Present Illness:  The patient presents for postoperative evaluation.  She reports that she is doing very well.  She has very minimal swelling or pain.  She is happy with her outcome.    Objective:  Ht 1.626 m (5' 4\")   Wt 108.4 kg (239 lb)   BMI 41.02 kg/m²      Physical Exam:  The patient is well-appearing and in no apparent distress  Examination of the right knee   There is a trace effusion, no gross deformity or skin changes  Range of motion reveals 0 to 130  Neg lachman, negative posterior drawer, no pain or laxity with varus or valgus stress at 0 degrees and 30 degrees of flexion  no joint line tenderness  5 out of 5 strength throughout distal muscle groups  Sensation is intact to light touch throughout all distributions  There is no calf swelling or tenderness  Palpable DP pulse, brisk cap refill, 2+ symmetric reflexes     Assessment:  Status post right knee arthroscopy with diffuse synovectomy on 5/2/2025  Pathology consistent with PVNS    Plan:  The patient is doing reasonly well.  Continue physical therapy.  I did tell the patient that at the time of surgery she did have diffuse disease process with significant synovial hypertrophy and inflammation.  She also had signs of chondral degeneration that was rather diffuse.  As result the patient may develop progressive degeneration knee and continued pain.  However she is doing extremely well right now and therefore has no restrictions.  She will return to see me as needed.                   Stanley Ndiaye MD            Orthopaedic Surgery Sports Medicine and Arthroscopy            [facility]            Team Physician [facility]      Disclaimer:  This note was dictated with voice recognition software.  Though review and correction are routine,

## 2025-06-18 ENCOUNTER — HOSPITAL ENCOUNTER (OUTPATIENT)
Dept: PHYSICAL THERAPY | Age: 55
Setting detail: THERAPIES SERIES
Discharge: HOME | End: 2025-06-18
Attending: ORTHOPAEDIC SURGERY
Payer: COMMERCIAL

## 2025-06-18 PROCEDURE — 97110 THERAPEUTIC EXERCISES: CPT

## 2025-06-18 NOTE — FLOWSHEET NOTE
Palpation:   R - Poor quad    Patellar Girth Measurements  R: mid: 40.8 cm, supra 45.5   L: mid: 40.8 cm. 43.4     Posture:   decreased WB on R    Bandages/Dressings/Incisions:  Patients wound/incision appears to be healing as expected  Patients wound/incision appears clean, dry and intact  No signs or symptoms indicating infection including: abnormal warmth/heat, streaking redness, pus/colored discharge, or odor    Gait:    Pattern: decreased enssa, decreased step height on right, decreased step length on right, and decreased stance time on right  Assistive Device Used: Rolling walker (RW)    Balance:  [x] WNL      [] NT       [] Dysfunction noted  Comment:     Falls Risk Assessment (30 days):   Falls Risk assessed and no intervention required.  Time Up and Go (TUG):   Not Assessed      Exercises/Interventions     Therapeutic Ex (41461)  resistance Sets/time Reps Notes/Cues/Progressions   NuStep  5'     Recumbent Bike  4'  5/16 - rocking w/ 3\" stretch into flex   Elliptical   3'  6/6   Treadmill   5'  6/11          Calf Stretch  IB  4x30\"     SL HR (L elevated on 6\")  2x10  5/23          HSC EOB  3x30\"     Standing ADD Stretch  10\"x10  5/14   Quad Stretch SOS EOB  4x30\"  5/14   Quadruped Rockback  10x10\"  6/6          HR off IB  3x10  5/12, 6/6^sets   Wall Sit w/ SB  3x15-20\"  6/6          CKC TKE Red rogue 2/5\"x15  5/9  - poor control/knee flex. held   LAQ 3# 3   12-15 5/12, 5/21^wt  5/23 - held wt d/t fatigue, 6/13^volume   SLR flex 2# 3 8-10 5/21 - independent, 5/30^reps, 6/13^volume   S/L Hip ABd 2# 1  2 10  15 6/4, 6/13^volume  6/18^reps   Supine Bridge  staggered 1 15 6/11          Heel slide  10\" 15 5/12   SB HS curl w/ SOS   5\" 15 5/21   SB Bridge   3 15 5/23, 6/4^reps, 6/6^sets   TRX Lat Squat  3 8 6/13   Captain Dean HR (LLE elevated 6\")  3 12 6/13          Quantum HSC  30# 3x8  5/28, 6/18^wt   LP - 2u1d 60# 3x8  5/30, 6/13^wt          SAQ  2 10 5/12          Re-assessment/LEFS   13' 6/11

## 2025-06-20 ENCOUNTER — HOSPITAL ENCOUNTER (OUTPATIENT)
Dept: PHYSICAL THERAPY | Age: 55
Setting detail: THERAPIES SERIES
Discharge: HOME | End: 2025-06-20
Attending: ORTHOPAEDIC SURGERY
Payer: COMMERCIAL

## 2025-06-20 PROCEDURE — 97110 THERAPEUTIC EXERCISES: CPT

## 2025-06-20 NOTE — FLOWSHEET NOTE
flexion/extension strength to at least 5/5 throughout without pain to allow for proper functional mobility to enable patient to ascend/descend stairs and walk  >1 mile.   [x] Progressing: [] Met: [] Not Met: [] Adjusted  Patient will return to driving >40 min to work without increased symptoms or restriction.   [x] Progressing: [] Met: [] Not Met: [] Adjusted  Patient will return to yoga/piliates 2x per week to maintain health and wellness throughout lifespan.   [x] Progressing: [] Met: [] Not Met: [] Adjusted      Overall Progression Towards Functional goals/ Treatment Progress Update:  [x] Patient is progressing as expected towards functional goals listed.    [] Progression is slowed due to complexities/Impairments listed.  [] Progression has been slowed due to co-morbidities.  [] Plan just implemented, too soon (<30days) to assess goals progression   [] Goals require adjustment due to lack of progress  [] Patient is not progressing as expected and requires additional follow up with physician  [] Other:     TREATMENT PLAN     Frequency/Duration: 2x/week for 6 weeks for the following treatment interventions:     Interventions:  Therapeutic Exercise (96846) including: strength training, ROM, and functional mobility  Therapeutic Activities (73518) including: functional mobility training and education.  Neuromuscular Re-education (75960) activation and proprioception, including postural re-education.    Gait Training (42759) for normalization of ambulation patterns and AD training.   Manual Therapy (87476) as indicated to include: Passive Range of Motion, Gr I-IV mobilizations, and Soft Tissue Mobilization  Modalities as needed that may include: Cryotherapy and Vasoneumatic Compression  Patient education on postural re-education, activity modification, and progression of HEP    Plan: Knee flex ROM, gradual strength progressions in CKC per tolerance    Electronically Signed by Alexandr Luong PT, DPT  Date: 06/20/2025

## 2025-06-25 ENCOUNTER — APPOINTMENT (OUTPATIENT)
Dept: PHYSICAL THERAPY | Age: 55
End: 2025-06-25
Attending: ORTHOPAEDIC SURGERY
Payer: COMMERCIAL

## 2025-06-25 ENCOUNTER — HOSPITAL ENCOUNTER (OUTPATIENT)
Dept: PHYSICAL THERAPY | Age: 55
Setting detail: THERAPIES SERIES
Discharge: HOME | End: 2025-06-25
Attending: ORTHOPAEDIC SURGERY
Payer: COMMERCIAL

## 2025-06-25 PROCEDURE — 97110 THERAPEUTIC EXERCISES: CPT

## 2025-06-27 ENCOUNTER — HOSPITAL ENCOUNTER (OUTPATIENT)
Dept: PHYSICAL THERAPY | Age: 55
Setting detail: THERAPIES SERIES
Discharge: HOME | End: 2025-06-27
Attending: ORTHOPAEDIC SURGERY
Payer: COMMERCIAL

## 2025-06-27 PROCEDURE — 97110 THERAPEUTIC EXERCISES: CPT

## 2025-06-27 NOTE — FLOWSHEET NOTE
[facility] - Outpatient Rehabilitation and Therapy: [address], [place] [address] office: [phone] fax: [phone]     Physical Therapy: TREATMENT/PROGRESS NOTE   Patient: Lise Ferrari (55 y.o. female)   Examination Date: 2025   :  1970 MRN: [id number]   Visit #: 17   Insurance Allowable Auth Needed   MN []Yes    [x]No    Insurance: Payor: [employer] / Plan: UNITED HE[employer]CARE - CHOICE PLUS / Product Type: *No Product type* /   Insurance ID: [id number] - (Commercial)  Secondary Insurance (if applicable):    Treatment Diagnosis:     ICD-10-CM    1. Status post arthroscopy of right knee  Z98.890       2. Impaired functional mobility, balance, gait, and endurance  Z74.09       3. Quadriceps weakness  M62.81       4. Decreased range of motion (ROM) of right knee  M25.661          Medical Diagnosis:  Post-op pain [G89.18]   Referring Physician: Stanley Ndiaye MD  PCP: Dina Cash MD     Plan of care signed (Y/N):     Date of Patient follow up with Physician: PRN     Plan of Care Report: NO   POC update due: (10 visits /OR AUTH LIMITS, whichever is less)  25                                            Medical History:  Comorbidities:  Osteoarthritis  Relevant Medical History:                                          Precautions/ Contra-indications:           Latex allergy:  NO  Pacemaker:    NO  Contraindications for Manipulation: NA  Date of Surgery: 25 - R Knee arthroscopy synovectomy, synovial biopsy  Other:    Red Flags:  None    Suicide Screening:   The patient did not verbalize a primary behavioral concern, suicidal ideation, suicidal intent, or demonstrate suicidal behaviors.    Preferred Language for Healthcare:   [x] English       [] other:    SUBJECTIVE EXAMINATION     Patient stated complaint: Pt reports she is receiving another injection in her L ankle next week. Plans to inquire about PT for L ankle at the same time. Is confident in

## 2025-06-30 ENCOUNTER — HOSPITAL ENCOUNTER (OUTPATIENT)
Dept: PHYSICAL THERAPY | Age: 55
Setting detail: THERAPIES SERIES
Discharge: HOME | End: 2025-06-30
Attending: ORTHOPAEDIC SURGERY
Payer: COMMERCIAL

## 2025-06-30 PROCEDURE — 97110 THERAPEUTIC EXERCISES: CPT

## 2025-06-30 NOTE — FLOWSHEET NOTE
[facility] - Outpatient Rehabilitation and Therapy: [address], [place] [address] office: [phone] fax: [phone]     Physical Therapy: TREATMENT/PROGRESS NOTE   Patient: Lise Ferrari (55 y.o. female)   Examination Date: 2025   :  1970 MRN: [id number]   Visit #: 18   Insurance Allowable Auth Needed   MN []Yes    [x]No    Insurance: Payor: [employer] / Plan: UNITED HEALTHCARE - CHOICE PLUS / Product Type: *No Product type* /   Insurance ID: [id number] - (Commercial)  Secondary Insurance (if applicable):    Treatment Diagnosis:     ICD-10-CM    1. Status post arthroscopy of right knee  Z98.890       2. Impaired functional mobility, balance, gait, and endurance  Z74.09       3. Quadriceps weakness  M62.81       4. Decreased range of motion (ROM) of right knee  M25.661          Medical Diagnosis:  Post-op pain [G89.18]   Referring Physician: Stanley Ndiaye MD  PCP: Dina Cash MD     Plan of care signed (Y/N):     Date of Patient follow up with Physician: PRN     Plan of Care Report: NO   POC update due: (10 visits /OR AUTH LIMITS, whichever is less)  25                                            Medical History:  Comorbidities:  Osteoarthritis  Relevant Medical History:                                          Precautions/ Contra-indications:           Latex allergy:  NO  Pacemaker:    NO  Contraindications for Manipulation: NA  Date of Surgery: 25 - R Knee arthroscopy synovectomy, synovial biopsy  Other:    Red Flags:  None    Suicide Screening:   The patient did not verbalize a primary behavioral concern, suicidal ideation, suicidal intent, or demonstrate suicidal behaviors.    Preferred Language for Healthcare:   [x] English       [] other:    SUBJECTIVE EXAMINATION     Patient stated complaint: Pt reports she was very sore following last visit. Knee continues to feel \"stiff and swollen\" at times, however, L ankle has been the most

## 2025-07-02 ENCOUNTER — HOSPITAL ENCOUNTER (OUTPATIENT)
Dept: PHYSICAL THERAPY | Age: 55
Setting detail: THERAPIES SERIES
Discharge: HOME | End: 2025-07-02
Attending: ORTHOPAEDIC SURGERY
Payer: COMMERCIAL

## 2025-07-02 PROCEDURE — 97110 THERAPEUTIC EXERCISES: CPT

## 2025-07-02 NOTE — FLOWSHEET NOTE
getting dressed.  [x] Progressing: [] Met: [] Not Met: [] Adjusted  Patient will demonstrate increased R knee AROM of -4 - 60 to 0-120 without pain to allow for proper joint functioning to enable patient to squat to the floor and ambulate free of compensation.   [x] Progressing: [] Met: [] Not Met: [] Adjusted  Patient will demonstrate increased R knee flexion/extension strength to at least 5/5 throughout without pain to allow for proper functional mobility to enable patient to ascend/descend stairs and walk  >1 mile.   [x] Progressing: [] Met: [] Not Met: [] Adjusted  Patient will return to driving >40 min to work without increased symptoms or restriction.   [x] Progressing: [] Met: [] Not Met: [] Adjusted  Patient will return to yoga/piliates 2x per week to maintain health and wellness throughout lifespan.   [x] Progressing: [] Met: [] Not Met: [] Adjusted      Overall Progression Towards Functional goals/ Treatment Progress Update:  [x] Patient is progressing as expected towards functional goals listed.    [] Progression is slowed due to complexities/Impairments listed.  [] Progression has been slowed due to co-morbidities.  [] Plan just implemented, too soon (<30days) to assess goals progression   [] Goals require adjustment due to lack of progress  [] Patient is not progressing as expected and requires additional follow up with physician  [] Other:     TREATMENT PLAN     Frequency/Duration: 2x/week for 6 weeks for the following treatment interventions:     Interventions:  Therapeutic Exercise (22192) including: strength training, ROM, and functional mobility  Therapeutic Activities (16620) including: functional mobility training and education.  Neuromuscular Re-education (19907) activation and proprioception, including postural re-education.    Gait Training (96443) for normalization of ambulation patterns and AD training.   Manual Therapy (83322) as indicated to include: Passive Range of Motion, Gr I-IV

## 2025-07-09 ENCOUNTER — OFFICE VISIT (OUTPATIENT)
Dept: FAMILY MEDICINE CLINIC | Age: 55
End: 2025-07-09
Payer: COMMERCIAL

## 2025-07-09 ENCOUNTER — HOSPITAL ENCOUNTER (OUTPATIENT)
Dept: PHYSICAL THERAPY | Age: 55
Setting detail: THERAPIES SERIES
Discharge: HOME | End: 2025-07-09
Attending: ORTHOPAEDIC SURGERY
Payer: COMMERCIAL

## 2025-07-09 VITALS
HEIGHT: 64 IN | WEIGHT: 246.6 LBS | TEMPERATURE: 97 F | SYSTOLIC BLOOD PRESSURE: 140 MMHG | BODY MASS INDEX: 42.1 KG/M2 | OXYGEN SATURATION: 97 % | HEART RATE: 78 BPM | DIASTOLIC BLOOD PRESSURE: 100 MMHG

## 2025-07-09 DIAGNOSIS — I10 PRIMARY HYPERTENSION: ICD-10-CM

## 2025-07-09 DIAGNOSIS — E03.9 ACQUIRED HYPOTHYROIDISM: ICD-10-CM

## 2025-07-09 DIAGNOSIS — E03.9 HYPOTHYROIDISM, UNSPECIFIED TYPE: ICD-10-CM

## 2025-07-09 DIAGNOSIS — E66.01 MORBID OBESITY WITH BMI OF 40.0-44.9, ADULT (HCC): ICD-10-CM

## 2025-07-09 DIAGNOSIS — I10 PRIMARY HYPERTENSION: Primary | ICD-10-CM

## 2025-07-09 DIAGNOSIS — E28.2 PCOS (POLYCYSTIC OVARIAN SYNDROME): ICD-10-CM

## 2025-07-09 PROBLEM — R03.0 ELEVATED BLOOD PRESSURE READING: Status: RESOLVED | Noted: 2025-03-04 | Resolved: 2025-07-09

## 2025-07-09 PROCEDURE — 97110 THERAPEUTIC EXERCISES: CPT

## 2025-07-09 PROCEDURE — 3080F DIAST BP >= 90 MM HG: CPT | Performed by: FAMILY MEDICINE

## 2025-07-09 PROCEDURE — 1036F TOBACCO NON-USER: CPT | Performed by: FAMILY MEDICINE

## 2025-07-09 PROCEDURE — 3077F SYST BP >= 140 MM HG: CPT | Performed by: FAMILY MEDICINE

## 2025-07-09 PROCEDURE — 99214 OFFICE O/P EST MOD 30 MIN: CPT | Performed by: FAMILY MEDICINE

## 2025-07-09 PROCEDURE — G8427 DOCREV CUR MEDS BY ELIG CLIN: HCPCS | Performed by: FAMILY MEDICINE

## 2025-07-09 PROCEDURE — 3017F COLORECTAL CA SCREEN DOC REV: CPT | Performed by: FAMILY MEDICINE

## 2025-07-09 PROCEDURE — G8417 CALC BMI ABV UP PARAM F/U: HCPCS | Performed by: FAMILY MEDICINE

## 2025-07-09 RX ORDER — LEVOTHYROXINE SODIUM 50 UG/1
TABLET ORAL
Qty: 90 TABLET | Refills: 1 | Status: SHIPPED | OUTPATIENT
Start: 2025-07-09

## 2025-07-09 RX ORDER — LEVOTHYROXINE SODIUM 50 UG/1
TABLET ORAL
Qty: 90 TABLET | Refills: 1 | Status: CANCELLED | OUTPATIENT
Start: 2025-07-09

## 2025-07-09 RX ORDER — VALSARTAN 80 MG/1
TABLET ORAL
Qty: 67 TABLET | Refills: 1 | Status: SHIPPED | OUTPATIENT
Start: 2025-07-09 | End: 2025-07-10

## 2025-07-09 SDOH — ECONOMIC STABILITY: FOOD INSECURITY: WITHIN THE PAST 12 MONTHS, THE FOOD YOU BOUGHT JUST DIDN'T LAST AND YOU DIDN'T HAVE MONEY TO GET MORE.: NEVER TRUE

## 2025-07-09 SDOH — ECONOMIC STABILITY: FOOD INSECURITY: WITHIN THE PAST 12 MONTHS, YOU WORRIED THAT YOUR FOOD WOULD RUN OUT BEFORE YOU GOT MONEY TO BUY MORE.: NEVER TRUE

## 2025-07-09 ASSESSMENT — PATIENT HEALTH QUESTIONNAIRE - PHQ9
1. LITTLE INTEREST OR PLEASURE IN DOING THINGS: NOT AT ALL
SUM OF ALL RESPONSES TO PHQ QUESTIONS 1-9: 0
SUM OF ALL RESPONSES TO PHQ QUESTIONS 1-9: 0
2. FEELING DOWN, DEPRESSED OR HOPELESS: NOT AT ALL
SUM OF ALL RESPONSES TO PHQ QUESTIONS 1-9: 0
SUM OF ALL RESPONSES TO PHQ QUESTIONS 1-9: 0

## 2025-07-09 NOTE — FLOWSHEET NOTE
[facility] - Outpatient Rehabilitation and Therapy: [address], [place] [address] office: [phone] fax: [phone]     Physical Therapy: TREATMENT/PROGRESS NOTE   Patient: Lise Ferrari (55 y.o. female)   Examination Date: 2025   :  1970 MRN: [id number]   Visit #: 20   Insurance Allowable Auth Needed   [place] []Yes    [x]No    Insurance: Payor: [employer] / Plan: [employer] - CHOICE PLUS / Product Type: *No Product type* /   Insurance ID: [id number] - (Commercial)  Secondary Insurance (if applicable):    Treatment Diagnosis:     ICD-10-CM    1. Status post arthroscopy of right knee  Z98.890       2. Impaired functional mobility, balance, gait, and endurance  Z74.09       3. Quadriceps weakness  M62.81       4. Decreased range of motion (ROM) of right knee  M25.661          Medical Diagnosis:  Post-op pain [G89.18]   Referring Physician: Stanley Ndiaye MD  PCP: Dina Cash MD     Plan of care signed (Y/N):     Date of Patient follow up with Physician: PRN     Plan of Care Report: NO   POC update due: (10 visits /OR AUTH LIMITS, whichever is less)  25                                            Medical History:  Comorbidities:  Osteoarthritis  Relevant Medical History:                                          Precautions/ Contra-indications:           Latex allergy:  NO  Pacemaker:    NO  Contraindications for Manipulation: NA  Date of Surgery: 25 - R Knee arthroscopy synovectomy, synovial biopsy  Other:    Red Flags:  None    Suicide Screening:   The patient did not verbalize a primary behavioral concern, suicidal ideation, suicidal intent, or demonstrate suicidal behaviors.    Preferred Language for Healthcare:   [x] English       [] other:    SUBJECTIVE EXAMINATION     Patient stated complaint: Pt reports she received a script for her L ankle and an injection. Only pain at this point is in the L ankle. Feels her R knee is at 85-90%.

## 2025-07-09 NOTE — PROGRESS NOTES
Chief complaint: Other and Headache (Headache for 5 days, possible sinus infection, blood pressure check.)      SUBJECTIVE:  HPI  Lise Ferrari (:  1970) is a 55 y.o. female with a past medical history of class 3 obesity who presents with a chief complaint of: headache for 5 days. Thought it was a migraine. But it has lingered. Bp is high at home - systolic 150s. Bp was high in feb when I saw her last. Only has noticed fluttering in chest when lying down in the last few days.     Patient Active Problem List   Diagnosis    Hypothyroidism    AR (allergic rhinitis)    Allergy-induced asthma    PCOS (polycystic ovarian syndrome)    Eczema    Morbid obesity with BMI of 40.0-44.9, adult ([facility])    Sinusitis, chronic    Headache    Allergic rhinitis due to pollen    Perimenopausal vasomotor symptoms    Perimenopausal atrophic vaginitis    PVNS (pigmented villonodular synovitis)     Past Medical History:   Diagnosis Date    Allergy-induced asthma 2014    AR (allergic rhinitis)     Eczema     Morbid obesity with BMI of 40.0-44.9, adult ([facility])     PCO (polycystic ovaries)     Prolonged emergence from general anesthesia     Unspecified hypothyroidism      Current Outpatient Medications on File Prior to Visit   Medication Sig Dispense Refill    Azelaic Acid (FINACEA) 15 % GEL Apply to affected area daily 50 g 0    albuterol sulfate HFA (VENTOLIN HFA) 108 (90 Base) MCG/ACT inhaler Inhale 2 puffs into the lungs every 6 hours as needed for Wheezing 1 each 3    triamcinolone (KENALOG) 0.1 % ointment Apply to affected area BID PRN flares 80 g 2    budesonide-formoterol (SYMBICORT) 160-4.5 MCG/ACT AERO Inhale 2 puffs into the lungs 2 times daily 30.6 g 5    Vitamin D (CHOLECALCIFEROL) 1000 UNITS CAPS capsule Take 1 capsule by mouth daily      cetirizine (ZYRTEC) 10 MG tablet Take 1 tablet by mouth daily      therapeutic multivitamin-minerals (THERAGRAN-M) tablet Take 1 tablet by mouth daily       No current

## 2025-07-10 DIAGNOSIS — E03.9 ACQUIRED HYPOTHYROIDISM: ICD-10-CM

## 2025-07-10 DIAGNOSIS — I10 PRIMARY HYPERTENSION: ICD-10-CM

## 2025-07-10 DIAGNOSIS — E28.2 PCOS (POLYCYSTIC OVARIAN SYNDROME): ICD-10-CM

## 2025-07-10 RX ORDER — VALSARTAN 80 MG/1
TABLET ORAL
Qty: 162 TABLET | Refills: 0 | Status: SHIPPED | OUTPATIENT
Start: 2025-07-10

## 2025-07-10 NOTE — TELEPHONE ENCOUNTER
Medication:   Requested Prescriptions     Pending Prescriptions Disp Refills    valsartan (DIOVAN) 80 MG tablet [Pharmacy Med Name: VALSARTAN 80MG TABLETS] 162 tablet 0     Sig: TAKE 1 TABLET BY MOUTH DAILY FOR 7 DAYS THEN TAKE 2 TABLETS BY MOUTH DAILY        Last Filled:  07/09/2025 #67 1rf: Requesting #90     Patient Phone Number: [phone] (home)     Last appt: 7/9/2025   Next appt: Visit date not found    Last OARRS:        No data to display

## 2025-07-11 ENCOUNTER — APPOINTMENT (OUTPATIENT)
Dept: PHYSICAL THERAPY | Age: 55
End: 2025-07-11
Attending: ORTHOPAEDIC SURGERY
Payer: COMMERCIAL

## 2025-07-11 LAB
ALBUMIN SERPL-MCNC: 4 G/DL (ref 3.4–5)
ALBUMIN/GLOB SERPL: 1.7 {RATIO} (ref 1.1–2.2)
ALP SERPL-CCNC: 77 U/L (ref 40–129)
ALT SERPL-CCNC: 17 U/L (ref 10–40)
ANION GAP SERPL CALCULATED.3IONS-SCNC: 10 MMOL/L (ref 3–16)
AST SERPL-CCNC: 20 U/L (ref 15–37)
BACTERIA URNS QL MICRO: ABNORMAL /HPF
BASOPHILS # BLD: 0.1 K/UL (ref 0–0.2)
BASOPHILS NFR BLD: 1 %
BILIRUB SERPL-MCNC: 0.3 MG/DL (ref 0–1)
BILIRUB UR QL STRIP.AUTO: NEGATIVE
BUN SERPL-MCNC: 18 MG/DL (ref 7–20)
CALCIUM SERPL-MCNC: 9.7 MG/DL (ref 8.3–10.6)
CHLORIDE SERPL-SCNC: 102 MMOL/L (ref 99–110)
CLARITY UR: ABNORMAL
CO2 SERPL-SCNC: 28 MMOL/L (ref 21–32)
COLOR UR: YELLOW
CREAT SERPL-MCNC: 0.6 MG/DL (ref 0.6–1.1)
CREAT UR-MCNC: 101 MG/DL (ref 28–259)
DEPRECATED RDW RBC AUTO: 14.3 % (ref 12.4–15.4)
EOSINOPHIL # BLD: 0.2 K/UL (ref 0–0.6)
EOSINOPHIL NFR BLD: 2 %
EPI CELLS #/AREA URNS AUTO: 1 /HPF (ref 0–5)
EST. AVERAGE GLUCOSE BLD GHB EST-MCNC: 108.3 MG/DL
GFR SERPLBLD CREATININE-BSD FMLA CKD-EPI: >90 ML/MIN/{1.73_M2}
GLUCOSE SERPL-MCNC: 97 MG/DL (ref 70–99)
GLUCOSE UR STRIP.AUTO-MCNC: NEGATIVE MG/DL
HBA1C MFR BLD: 5.4 %
HCT VFR BLD AUTO: 43 % (ref 36–48)
HGB BLD-MCNC: 13.9 G/DL (ref 12–16)
HGB UR QL STRIP.AUTO: NEGATIVE
HYALINE CASTS #/AREA URNS AUTO: 0 /LPF (ref 0–8)
KETONES UR STRIP.AUTO-MCNC: NEGATIVE MG/DL
LEUKOCYTE ESTERASE UR QL STRIP.AUTO: ABNORMAL
LYMPHOCYTES # BLD: 2.6 K/UL (ref 1–5.1)
LYMPHOCYTES NFR BLD: 30 %
MCH RBC QN AUTO: 29.5 PG (ref 26–34)
MCHC RBC AUTO-ENTMCNC: 32.3 G/DL (ref 31–36)
MCV RBC AUTO: 91.5 FL (ref 80–100)
MICROALBUMIN UR DL<=1MG/L-MCNC: <1.2 MG/DL
MICROALBUMIN/CREAT UR: NORMAL MG/G (ref 0–30)
MONOCYTES # BLD: 0.4 K/UL (ref 0–1.3)
MONOCYTES NFR BLD: 5 %
NEUTROPHILS # BLD: 5.3 K/UL (ref 1.7–7.7)
NEUTROPHILS NFR BLD: 62 %
NITRITE UR QL STRIP.AUTO: NEGATIVE
PH UR STRIP.AUTO: 7.5 [PH] (ref 5–8)
PLATELET # BLD AUTO: 298 K/UL (ref 135–450)
PMV BLD AUTO: 9.7 FL (ref 5–10.5)
POTASSIUM SERPL-SCNC: 4.7 MMOL/L (ref 3.5–5.1)
PROT SERPL-MCNC: 6.3 G/DL (ref 6.4–8.2)
PROT UR STRIP.AUTO-MCNC: NEGATIVE MG/DL
RBC # BLD AUTO: 4.7 M/UL (ref 4–5.2)
RBC CLUMPS #/AREA URNS AUTO: 1 /HPF (ref 0–4)
RBC MORPH BLD: NORMAL
SLIDE REVIEW: NORMAL
SODIUM SERPL-SCNC: 140 MMOL/L (ref 136–145)
SP GR UR STRIP.AUTO: 1.02 (ref 1–1.03)
TSH SERPL DL<=0.005 MIU/L-ACNC: 1.4 UIU/ML (ref 0.27–4.2)
UA DIPSTICK W REFLEX MICRO PNL UR: YES
URN SPEC COLLECT METH UR: ABNORMAL
UROBILINOGEN UR STRIP-ACNC: 0.2 E.U./DL
WBC # BLD AUTO: 8.6 K/UL (ref 4–11)
WBC #/AREA URNS AUTO: 2 /HPF (ref 0–5)

## 2025-07-16 ENCOUNTER — HOSPITAL ENCOUNTER (OUTPATIENT)
Dept: PHYSICAL THERAPY | Age: 55
Setting detail: THERAPIES SERIES
Discharge: HOME | End: 2025-07-16
Attending: ORTHOPAEDIC SURGERY
Payer: COMMERCIAL

## 2025-07-16 DIAGNOSIS — R52 PAIN: ICD-10-CM

## 2025-07-16 DIAGNOSIS — M25.60 DECREASED RANGE OF MOTION: ICD-10-CM

## 2025-07-16 DIAGNOSIS — Z74.09 IMPAIRED FUNCTIONAL MOBILITY, BALANCE, GAIT, AND ENDURANCE: Primary | ICD-10-CM

## 2025-07-16 DIAGNOSIS — R60.9 SWELLING: ICD-10-CM

## 2025-07-16 DIAGNOSIS — R53.1 DECREASED STRENGTH: ICD-10-CM

## 2025-07-16 PROCEDURE — 97161 PT EVAL LOW COMPLEX 20 MIN: CPT

## 2025-07-16 PROCEDURE — 97110 THERAPEUTIC EXERCISES: CPT

## 2025-07-16 NOTE — PLAN OF CARE
Adjusted  Patients closed-kinematic chain dorsiflexion lunge test to be at least 5 cm without pain to allow for proper joint functioning to enable patient to ascend/descend steps free of compensation.   [] Progressing: [] Met: [] Not Met: [] Adjusted  Patient to perform 5 full excursion single leg heel raises on the L to allow for proper functional mobility to enable patient to return to walking >15 minutes.   [] Progressing: [] Met: [] Not Met: [] Adjusted  Patient to complete 1 full grocery shopping trip reporting less than 3/10 pain following.   [] Progressing: [] Met: [] Not Met: [] Adjusted         5.   Patient to perform 5 lateral step downs from a 4 inch box on the L demonstrating improved LE strength, motor control, and proprioception for safe community ambulation.               [] Progressing: [] Met: [] Not Met: [] Adjusted    Overall Progression Towards Functional goals/ Treatment Progress Update:  [] Patient is progressing as expected towards functional goals listed.    [] Progression is slowed due to complexities/Impairments listed.  [] Progression has been slowed due to co-morbidities.  [x] Plan just implemented, too soon (<30days) to assess goals progression   [] Goals require adjustment due to lack of progress  [] Patient is not progressing as expected and requires additional follow up with physician  [] Other:     TREATMENT PLAN     Frequency/Duration: 2x/week for 6 weeks for the following treatment interventions:    Interventions:  Therapeutic Exercise (00037) including: strength training, ROM, and functional mobility  Therapeutic Activities (01047) including: functional mobility training and education.  Neuromuscular Re-education (85947) activation and proprioception, including postural re-education.    Manual Therapy (76620) as indicated to include: Passive Range of Motion, Gr I-IV mobilizations, and Soft Tissue Mobilization  Modalities as needed that may include: Cryotherapy  Patient education

## 2025-07-21 ENCOUNTER — HOSPITAL ENCOUNTER (OUTPATIENT)
Dept: PHYSICAL THERAPY | Age: 55
Setting detail: THERAPIES SERIES
Discharge: HOME | End: 2025-07-21
Attending: ORTHOPAEDIC SURGERY
Payer: COMMERCIAL

## 2025-07-21 PROCEDURE — 97110 THERAPEUTIC EXERCISES: CPT

## 2025-07-21 NOTE — FLOWSHEET NOTE
[facility] - Outpatient Rehabilitation and Therapy: [address], [place] [address] office: [phone] fax: [phone]    Physical Therapy: TREATMENT/PROGRESS NOTE   Patient: Lise Ferrari (55 y.o. female)   Examination Date: 2025   :  1970 MRN: [id number]   Visit #: 2 - L ANKLE  Insurance Allowable Auth Needed   MN []Yes    [x]No    Insurance: Payor: [employer] / Plan: UNITED HEALTHCARE - CHOICE PLUS / Product Type: *No Product type* /   Insurance ID: [id number] - (Commercial)  Secondary Insurance (if applicable):    Treatment Diagnosis:     ICD-10-CM    1. Impaired functional mobility, balance, gait, and endurance  Z74.09       2. Decreased range of motion  M25.60       3. Decreased strength  R53.1       4. Pain  R52       5. Swelling  R60.9          Medical Diagnosis:  (ICD-10-CM) - Other specified arthritis, left ankle and foot    Referring Physician:    Noemy Warner MD  Fax #[phone] PCP: Dina Csah MD     Plan of care signed (Y/N):   IE sent : No as of     Date of Patient follow up with Physician:      Plan of Care Report: NO  POC update due: (10 visits /OR AUTH LIMITS, whichever is less) 8/15/2025                                             Medical History:  Comorbidities:  Hypertension  Osteoarthritis  Relevant Medical History:                                          Precautions/ Contra-indications:           Latex allergy:  NO  Pacemaker:    NO  Contraindications for Manipulation: NA  Date of Surgery:   Other:    Red Flags:  None    Suicide Screening:   The patient did not verbalize a primary behavioral concern, suicidal ideation, suicidal intent, or demonstrate suicidal behaviors.    Preferred Language for Healthcare:   [x] English       [] other:    SUBJECTIVE EXAMINATION     Patient stated complaint: Pt reports being excited for her travels to [place] at the end of this week. Unsure how her ankle is going to feel with all of the

## 2025-07-23 ENCOUNTER — HOSPITAL ENCOUNTER (OUTPATIENT)
Dept: PHYSICAL THERAPY | Age: 55
Setting detail: THERAPIES SERIES
Discharge: HOME | End: 2025-07-23
Attending: ORTHOPAEDIC SURGERY
Payer: COMMERCIAL

## 2025-07-23 PROCEDURE — 97110 THERAPEUTIC EXERCISES: CPT

## 2025-07-23 NOTE — FLOWSHEET NOTE
(30)               HIP Flexion (120)            Abduction (45)            ER (50)            IR (45)            Ext (20)             KNEE Flex (140)            Ext (0)               ANKLE DF (20)            PF (50)      1 rep 5 reps full excusion     Inversion (30)            Eversion (20)             Repeated Movements: [] Normal  [x] Abnormal [] N/A    CKC DF Lunge   R: 10 cm  L: 1.5 cm     5 BW Squats: fair mechanics - decreased depth    Lateral Step-Down (4\"):   R: 5 reps - poor mechanics   L: unable    Palpation:   Patient reported tenderness with palpation  Location: talocrural joint     Posture:   WNL    Specific Joint Mobility Testing/Accessory Motions:      Foot/Ankle:  NT    Gait:    Pattern: antalgic pattern and decreased nessa  Assistive Device Used: no AD    Balance:   Dysfunction noted: SLS - L: 7 sec, R: 2 sec    Falls Risk Assessment (30 days):   Falls Risk assessed and no intervention required.  Time Up and Go (TUG):   Not Assessed      Exercises/Interventions     Therapeutic Ex (38029)  resistance Sets/time Reps Notes/Cues/Progressions          Upright Bike   5'     DF MWM   10x10\"     IB gastroc  3x30\"            HR off IB  2x15     HR knees bent   2x10            LSU 6\" 2x10     SL 3-way Reach   2x3 ea direction     Fwd Step Down 4\" 3x10  7/23^ht   LSU to Airex  3x15  7/23          FSU to Airex  3x15     Sled Push  45# 3 laps   Weight stack to desk  7/23          Mini Squat (heels hanging off 6\") No UE to assist 3x8            LP  55# 3x12  7/23          HEP performance and review for listed home exercise program. Educated patient on frequency, volume, proper technique, and monitoring symptoms while performing.    9'     Manual Intervention (42251)  TIME                          NMR re-education (84807) resistance Sets/time Reps CUES NEEDED                        Therapeutic Activity (63970)  Sets/time                   Time spent on patient education including PT services, POC, assessment

## 2025-07-30 ENCOUNTER — PATIENT MESSAGE (OUTPATIENT)
Age: 55
End: 2025-07-30

## 2025-07-30 ENCOUNTER — HOSPITAL ENCOUNTER (OUTPATIENT)
Dept: PHYSICAL THERAPY | Age: 55
Setting detail: THERAPIES SERIES
Discharge: HOME | End: 2025-07-30
Attending: ORTHOPAEDIC SURGERY
Payer: COMMERCIAL

## 2025-07-30 PROCEDURE — 97110 THERAPEUTIC EXERCISES: CPT

## 2025-07-30 NOTE — FLOWSHEET NOTE
[facility] - Outpatient Rehabilitation and Therapy: [address],  [address] office: [phone] fax: [phone]    Physical Therapy: TREATMENT/PROGRESS NOTE   Patient: Lise Ferrari (55 y.o. female)   Examination Date: 2025   :  1970 MRN: [id number]   Visit #: 21 - L ANKLE  Insurance Allowable Auth Needed   MN []Yes    [x]No    Insurance: Payor: [employer] / Plan: UNITED HEALTHCARE - CHOICE PLUS / Product Type: *No Product type* /   Insurance ID: [id number] - (Commercial)  Secondary Insurance (if applicable):    Treatment Diagnosis:     ICD-10-CM    1. Impaired functional mobility, balance, gait, and endurance  Z74.09       2. Decreased range of motion  M25.60       3. Decreased strength  R53.1       4. Pain  R52       5. Swelling  R60.9          Medical Diagnosis:  (ICD-10-CM) - Other specified arthritis, left ankle and foot    Referring Physician:    Noemy Warner MD  Fax #[phone] PCP: Dina Cash MD     Plan of care signed (Y/N):   IE sent : No as of     Date of Patient follow up with Physician:      Plan of Care Report: NO  POC update due: (10 visits /OR AUTH LIMITS, whichever is less) 8/15/2025                                             Medical History:  Comorbidities:  Hypertension  Osteoarthritis  Relevant Medical History:                                          Precautions/ Contra-indications:           Latex allergy:  NO  Pacemaker:    NO  Contraindications for Manipulation: NA  Date of Surgery:   Other:    Red Flags:  None    Suicide Screening:   The patient did not verbalize a primary behavioral concern, suicidal ideation, suicidal intent, or demonstrate suicidal behaviors.    Preferred Language for Healthcare:   [x] English       [] other:    SUBJECTIVE EXAMINATION     Patient stated complaint: Pt c/o moderate medial L ankle soreness this morning which she attributes to walking around [place] for a few days.     IE: Pt

## 2025-07-31 NOTE — TELEPHONE ENCOUNTER
Maybe see if she wants to come on Monday where we opened up some of the spots-since has been a year I can go ahead and do her skin check then.

## 2025-08-04 ENCOUNTER — OFFICE VISIT (OUTPATIENT)
Age: 55
End: 2025-08-04
Payer: COMMERCIAL

## 2025-08-04 DIAGNOSIS — D22.9 MULTIPLE NEVI: ICD-10-CM

## 2025-08-04 DIAGNOSIS — L40.9 PSORIASIS: ICD-10-CM

## 2025-08-04 DIAGNOSIS — L71.9 ROSACEA: ICD-10-CM

## 2025-08-04 DIAGNOSIS — D48.5 NEOPLASM OF UNCERTAIN BEHAVIOR OF SKIN: Primary | ICD-10-CM

## 2025-08-04 DIAGNOSIS — L57.0 AK (ACTINIC KERATOSIS): ICD-10-CM

## 2025-08-04 DIAGNOSIS — L81.4 LENTIGINES: ICD-10-CM

## 2025-08-04 PROCEDURE — 99214 OFFICE O/P EST MOD 30 MIN: CPT | Performed by: DERMATOLOGY

## 2025-08-04 PROCEDURE — 3017F COLORECTAL CA SCREEN DOC REV: CPT | Performed by: DERMATOLOGY

## 2025-08-04 PROCEDURE — G8417 CALC BMI ABV UP PARAM F/U: HCPCS | Performed by: DERMATOLOGY

## 2025-08-04 PROCEDURE — 1036F TOBACCO NON-USER: CPT | Performed by: DERMATOLOGY

## 2025-08-04 PROCEDURE — 11102 TANGNTL BX SKIN SINGLE LES: CPT | Performed by: DERMATOLOGY

## 2025-08-04 PROCEDURE — G8427 DOCREV CUR MEDS BY ELIG CLIN: HCPCS | Performed by: DERMATOLOGY

## 2025-08-06 ENCOUNTER — HOSPITAL ENCOUNTER (OUTPATIENT)
Dept: PHYSICAL THERAPY | Age: 55
Setting detail: THERAPIES SERIES
Discharge: HOME | End: 2025-08-06
Attending: ORTHOPAEDIC SURGERY
Payer: COMMERCIAL

## 2025-08-06 PROCEDURE — 97110 THERAPEUTIC EXERCISES: CPT

## 2025-08-08 ENCOUNTER — HOSPITAL ENCOUNTER (OUTPATIENT)
Dept: PHYSICAL THERAPY | Age: 55
Setting detail: THERAPIES SERIES
Discharge: HOME | End: 2025-08-08
Attending: ORTHOPAEDIC SURGERY
Payer: COMMERCIAL

## 2025-08-08 LAB — DERMATOLOGY PATHOLOGY REPORT: NORMAL

## 2025-08-08 PROCEDURE — 97110 THERAPEUTIC EXERCISES: CPT

## 2025-08-13 ENCOUNTER — RESULTS FOLLOW-UP (OUTPATIENT)
Age: 55
End: 2025-08-13

## 2025-08-13 ENCOUNTER — HOSPITAL ENCOUNTER (OUTPATIENT)
Dept: PHYSICAL THERAPY | Age: 55
Setting detail: THERAPIES SERIES
Discharge: HOME | End: 2025-08-13
Attending: ORTHOPAEDIC SURGERY
Payer: COMMERCIAL

## 2025-08-13 PROCEDURE — 97110 THERAPEUTIC EXERCISES: CPT

## 2025-08-15 ENCOUNTER — APPOINTMENT (OUTPATIENT)
Dept: PHYSICAL THERAPY | Age: 55
End: 2025-08-15
Attending: ORTHOPAEDIC SURGERY
Payer: COMMERCIAL

## 2025-08-15 ENCOUNTER — HOSPITAL ENCOUNTER (OUTPATIENT)
Dept: PHYSICAL THERAPY | Age: 55
Setting detail: THERAPIES SERIES
Discharge: HOME | End: 2025-08-15
Attending: ORTHOPAEDIC SURGERY
Payer: COMMERCIAL

## 2025-08-15 PROCEDURE — 97110 THERAPEUTIC EXERCISES: CPT

## 2025-08-20 ENCOUNTER — HOSPITAL ENCOUNTER (OUTPATIENT)
Dept: PHYSICAL THERAPY | Age: 55
Setting detail: THERAPIES SERIES
Discharge: HOME | End: 2025-08-20
Attending: ORTHOPAEDIC SURGERY
Payer: COMMERCIAL

## 2025-08-20 PROCEDURE — 97110 THERAPEUTIC EXERCISES: CPT

## 2025-08-22 ENCOUNTER — APPOINTMENT (OUTPATIENT)
Dept: PHYSICAL THERAPY | Age: 55
End: 2025-08-22
Attending: ORTHOPAEDIC SURGERY
Payer: COMMERCIAL

## 2025-08-22 ENCOUNTER — HOSPITAL ENCOUNTER (OUTPATIENT)
Dept: PHYSICAL THERAPY | Age: 55
Setting detail: THERAPIES SERIES
Discharge: HOME | End: 2025-08-22
Attending: ORTHOPAEDIC SURGERY
Payer: COMMERCIAL

## 2025-08-22 PROCEDURE — 97110 THERAPEUTIC EXERCISES: CPT

## 2025-08-27 ENCOUNTER — OFFICE VISIT (OUTPATIENT)
Age: 55
End: 2025-08-27
Payer: COMMERCIAL

## 2025-08-27 VITALS
HEIGHT: 64 IN | WEIGHT: 251 LBS | DIASTOLIC BLOOD PRESSURE: 88 MMHG | SYSTOLIC BLOOD PRESSURE: 130 MMHG | BODY MASS INDEX: 42.85 KG/M2 | HEART RATE: 91 BPM | OXYGEN SATURATION: 98 % | TEMPERATURE: 97.6 F

## 2025-08-27 DIAGNOSIS — Z00.01 ENCOUNTER FOR ROUTINE ADULT HEALTH EXAMINATION WITH ABNORMAL FINDINGS: Primary | ICD-10-CM

## 2025-08-27 DIAGNOSIS — R40.0 DAYTIME SOMNOLENCE: ICD-10-CM

## 2025-08-27 DIAGNOSIS — E66.01 MORBID OBESITY WITH BMI OF 40.0-44.9, ADULT (HCC): ICD-10-CM

## 2025-08-27 DIAGNOSIS — I10 PRIMARY HYPERTENSION: ICD-10-CM

## 2025-08-27 DIAGNOSIS — R06.83 SNORING: ICD-10-CM

## 2025-08-27 LAB
ANION GAP SERPL CALCULATED.3IONS-SCNC: 11 MMOL/L (ref 3–16)
BUN SERPL-MCNC: 14 MG/DL (ref 7–20)
CALCIUM SERPL-MCNC: 9.8 MG/DL (ref 8.3–10.6)
CHLORIDE SERPL-SCNC: 102 MMOL/L (ref 99–110)
CO2 SERPL-SCNC: 28 MMOL/L (ref 21–32)
CREAT SERPL-MCNC: 0.6 MG/DL (ref 0.6–1.1)
GFR SERPLBLD CREATININE-BSD FMLA CKD-EPI: >90 ML/MIN/{1.73_M2}
GLUCOSE SERPL-MCNC: 94 MG/DL (ref 70–99)
POTASSIUM SERPL-SCNC: 4.6 MMOL/L (ref 3.5–5.1)
SODIUM SERPL-SCNC: 141 MMOL/L (ref 136–145)

## 2025-08-27 PROCEDURE — G8417 CALC BMI ABV UP PARAM F/U: HCPCS | Performed by: FAMILY MEDICINE

## 2025-08-27 PROCEDURE — 99396 PREV VISIT EST AGE 40-64: CPT | Performed by: FAMILY MEDICINE

## 2025-08-27 PROCEDURE — 1036F TOBACCO NON-USER: CPT | Performed by: FAMILY MEDICINE

## 2025-08-27 PROCEDURE — 3017F COLORECTAL CA SCREEN DOC REV: CPT | Performed by: FAMILY MEDICINE

## 2025-08-27 PROCEDURE — 3075F SYST BP GE 130 - 139MM HG: CPT | Performed by: FAMILY MEDICINE

## 2025-08-27 PROCEDURE — 99214 OFFICE O/P EST MOD 30 MIN: CPT | Performed by: FAMILY MEDICINE

## 2025-08-27 PROCEDURE — 3079F DIAST BP 80-89 MM HG: CPT | Performed by: FAMILY MEDICINE

## 2025-08-27 PROCEDURE — G8427 DOCREV CUR MEDS BY ELIG CLIN: HCPCS | Performed by: FAMILY MEDICINE

## 2025-08-27 PROCEDURE — 1000F TOBACCO USE ASSESSED: CPT | Performed by: FAMILY MEDICINE

## 2025-08-27 RX ORDER — MAGNESIUM GLUCONATE 27 MG(500)
500 TABLET ORAL 2 TIMES DAILY
COMMUNITY

## 2025-08-27 RX ORDER — VALSARTAN 80 MG/1
80 TABLET ORAL DAILY
Qty: 90 TABLET | Refills: 3 | Status: SHIPPED | OUTPATIENT
Start: 2025-08-27

## (undated) DEVICE — BNDG,ELSTC,MATRIX,STRL,6"X5YD,LF,HOOK&LP: Brand: MEDLINE

## (undated) DEVICE — ZIMMER® STERILE DISPOSABLE TOURNIQUET CUFF WITH PLC, DUAL PORT, SINGLE BLADDER, 34 IN. (86 CM)

## (undated) DEVICE — DYONICS 25 PATIENT TUBE SET MUST                                    BE USED WITH 7211007, 12 PER BOX

## (undated) DEVICE — MASTISOL ADHESIVE LIQ 2/3ML

## (undated) DEVICE — DRAPE,U/ SHT,SPLIT,PLAS,STERIL: Brand: MEDLINE

## (undated) DEVICE — STRIP,CLOSURE,WOUND,MEDI-STRIP,1/2X4: Brand: MEDLINE

## (undated) DEVICE — SUTURE ETHILON SZ 3-0 L18IN NONABSORBABLE BLK PS-2 L19MM 3/8 1669H

## (undated) DEVICE — SHEET,DRAPE,53X77,STERILE: Brand: MEDLINE

## (undated) DEVICE — DYONICS 25 DAY TUBE SET MUST BE                                    USED WITH 7211008, 3 PER BOX

## (undated) DEVICE — CONTAINER,SPECIMEN,OR STERILE,4OZ: Brand: MEDLINE

## (undated) DEVICE — KNEE ARTHROSCOPY: Brand: MEDLINE INDUSTRIES, INC.

## (undated) DEVICE — SOLUTION IRRIG 3000ML 0.9% SOD CHL USP UROMATIC PLAS CONT

## (undated) DEVICE — GLOVE SURG SZ 75 L12IN FNGR THK79MIL GRN LTX FREE

## (undated) DEVICE — DRESSING,GAUZE,XEROFORM,CURAD,1"X8",ST: Brand: CURAD

## (undated) DEVICE — WEREWOLF FLOW 90 COBLATION WAND: Brand: COBLATION

## (undated) DEVICE — SUTURE MONOCRYL + SZ 4-0 L27IN ABSRB UD L19MM PS-2 3/8 CIR MCP426H

## (undated) DEVICE — SYRINGE MED 30ML STD CLR PLAS LUERLOCK TIP N CTRL DISP

## (undated) DEVICE — STERILE SURGICAL BLADE SIZE 15: Brand: CARDINAL HEALTH

## (undated) DEVICE — 4.5 MM FULL RADIUS ELITE STRAIGHT                                    DISPOSABLE BLADES, MAROON,PACKAGED 6                                    PER BOX, STERILE

## (undated) DEVICE — GLOVE ORANGE PI 7 1/2   MSG9075

## (undated) DEVICE — 3M™ STERI-STRIP™ REINFORCED ADHESIVE SKIN CLOSURES, R1547, 1/2 IN X 4 IN (12 MM X 100 MM), 6 STRIPS/ENVELOPE: Brand: 3M™ STERI-STRIP™

## (undated) DEVICE — APPLICATOR MEDICATED 26 CC SOLUTION HI LT ORNG CHLORAPREP

## (undated) DEVICE — GOWN SIRUS NONREIN XL W/TWL: Brand: MEDLINE INDUSTRIES, INC.